# Patient Record
Sex: FEMALE | Race: ASIAN | URBAN - METROPOLITAN AREA
[De-identification: names, ages, dates, MRNs, and addresses within clinical notes are randomized per-mention and may not be internally consistent; named-entity substitution may affect disease eponyms.]

---

## 2018-02-13 ENCOUNTER — INPATIENT (INPATIENT)
Facility: HOSPITAL | Age: 33
LOS: 40 days | Discharge: HOME | End: 2018-03-26
Attending: PSYCHIATRY & NEUROLOGY | Admitting: FAMILY MEDICINE

## 2018-02-13 VITALS
HEART RATE: 124 BPM | TEMPERATURE: 97 F | DIASTOLIC BLOOD PRESSURE: 105 MMHG | WEIGHT: 154.98 LBS | OXYGEN SATURATION: 99 % | HEIGHT: 65 IN | RESPIRATION RATE: 18 BRPM | SYSTOLIC BLOOD PRESSURE: 146 MMHG

## 2018-02-13 DIAGNOSIS — F31.9 BIPOLAR DISORDER, UNSPECIFIED: ICD-10-CM

## 2018-02-13 DIAGNOSIS — F31.2 BIPOLAR DISORDER, CURRENT EPISODE MANIC SEVERE WITH PSYCHOTIC FEATURES: ICD-10-CM

## 2018-02-13 DIAGNOSIS — F31.63 BIPOLAR DISORDER, CURRENT EPISODE MIXED, SEVERE, WITHOUT PSYCHOTIC FEATURES: ICD-10-CM

## 2018-02-13 LAB
ANION GAP SERPL CALC-SCNC: 6 MMOL/L — LOW (ref 7–14)
ANION GAP SERPL CALC-SCNC: 7 MMOL/L — SIGNIFICANT CHANGE UP (ref 7–14)
APAP SERPL-MCNC: <10 UG/ML — SIGNIFICANT CHANGE UP (ref 10–30)
BASOPHILS # BLD AUTO: 0.04 K/UL — SIGNIFICANT CHANGE UP (ref 0–0.2)
BASOPHILS NFR BLD AUTO: 0.4 % — SIGNIFICANT CHANGE UP (ref 0–1)
BUN SERPL-MCNC: 6 MG/DL — LOW (ref 10–20)
BUN SERPL-MCNC: 7 MG/DL — LOW (ref 10–20)
CALCIUM SERPL-MCNC: 9.5 MG/DL — SIGNIFICANT CHANGE UP (ref 8.5–10.1)
CALCIUM SERPL-MCNC: 9.5 MG/DL — SIGNIFICANT CHANGE UP (ref 8.5–10.1)
CHLORIDE SERPL-SCNC: 103 MMOL/L — SIGNIFICANT CHANGE UP (ref 98–110)
CHLORIDE SERPL-SCNC: 106 MMOL/L — SIGNIFICANT CHANGE UP (ref 98–110)
CO2 SERPL-SCNC: 24 MMOL/L — SIGNIFICANT CHANGE UP (ref 17–32)
CO2 SERPL-SCNC: 26 MMOL/L — SIGNIFICANT CHANGE UP (ref 17–32)
CREAT SERPL-MCNC: 0.6 MG/DL — LOW (ref 0.7–1.5)
CREAT SERPL-MCNC: 0.6 MG/DL — LOW (ref 0.7–1.5)
EOSINOPHIL # BLD AUTO: 0.04 K/UL — SIGNIFICANT CHANGE UP (ref 0–0.7)
EOSINOPHIL NFR BLD AUTO: 0.4 % — SIGNIFICANT CHANGE UP (ref 0–8)
ETHANOL SERPL-MCNC: 5 MG/DL — HIGH
GLUCOSE SERPL-MCNC: 89 MG/DL — SIGNIFICANT CHANGE UP (ref 70–110)
GLUCOSE SERPL-MCNC: 92 MG/DL — SIGNIFICANT CHANGE UP (ref 70–110)
HCG SERPL-ACNC: <0.6 MIU/ML — SIGNIFICANT CHANGE UP (ref 0–5)
HCT VFR BLD CALC: 39.5 % — SIGNIFICANT CHANGE UP (ref 37–47)
HDLC SERPL-MCNC: 49 MG/DL — SIGNIFICANT CHANGE UP (ref 40–60)
HGB BLD-MCNC: 13.4 G/DL — LOW (ref 14–18)
IMM GRANULOCYTES NFR BLD AUTO: 0.2 % — SIGNIFICANT CHANGE UP (ref 0.1–0.3)
LYMPHOCYTES # BLD AUTO: 2.55 K/UL — SIGNIFICANT CHANGE UP (ref 1.2–3.4)
LYMPHOCYTES # BLD AUTO: 28.5 % — SIGNIFICANT CHANGE UP (ref 20.5–51.1)
MCHC RBC-ENTMCNC: 29.4 PG — SIGNIFICANT CHANGE UP (ref 27–31)
MCHC RBC-ENTMCNC: 33.9 G/DL — SIGNIFICANT CHANGE UP (ref 32–37)
MCV RBC AUTO: 86.6 FL — SIGNIFICANT CHANGE UP (ref 81–91)
MONOCYTES # BLD AUTO: 0.76 K/UL — HIGH (ref 0.1–0.6)
MONOCYTES NFR BLD AUTO: 8.5 % — SIGNIFICANT CHANGE UP (ref 1.7–9.3)
NEUTROPHILS # BLD AUTO: 5.55 K/UL — SIGNIFICANT CHANGE UP (ref 1.4–6.5)
NEUTROPHILS NFR BLD AUTO: 62 % — SIGNIFICANT CHANGE UP (ref 42.2–75.2)
NRBC # BLD: 0 /100 WBCS — SIGNIFICANT CHANGE UP (ref 0–0)
PLATELET # BLD AUTO: 368 K/UL — SIGNIFICANT CHANGE UP (ref 130–400)
POTASSIUM SERPL-MCNC: 4 MMOL/L — SIGNIFICANT CHANGE UP (ref 3.5–5)
POTASSIUM SERPL-MCNC: 4.1 MMOL/L — SIGNIFICANT CHANGE UP (ref 3.5–5)
POTASSIUM SERPL-SCNC: 4 MMOL/L — SIGNIFICANT CHANGE UP (ref 3.5–5)
POTASSIUM SERPL-SCNC: 4.1 MMOL/L — SIGNIFICANT CHANGE UP (ref 3.5–5)
RBC # BLD: 4.56 M/UL — SIGNIFICANT CHANGE UP (ref 4.2–5.4)
RBC # FLD: 11.5 % — SIGNIFICANT CHANGE UP (ref 11.5–14.5)
SALICYLATES SERPL-MCNC: <4 MG/DL — SIGNIFICANT CHANGE UP (ref 4–30)
SODIUM SERPL-SCNC: 136 MMOL/L — SIGNIFICANT CHANGE UP (ref 135–146)
SODIUM SERPL-SCNC: 136 MMOL/L — SIGNIFICANT CHANGE UP (ref 135–146)
WBC # BLD: 8.96 K/UL — SIGNIFICANT CHANGE UP (ref 4.8–10.8)
WBC # FLD AUTO: 8.96 K/UL — SIGNIFICANT CHANGE UP (ref 4.8–10.8)

## 2018-02-13 RX ORDER — ARIPIPRAZOLE 15 MG/1
10 TABLET ORAL DAILY
Qty: 0 | Refills: 0 | Status: DISCONTINUED | OUTPATIENT
Start: 2018-02-13 | End: 2018-02-15

## 2018-02-13 RX ADMIN — Medication 1 MILLIGRAM(S): at 12:08

## 2018-02-13 RX ADMIN — ARIPIPRAZOLE 10 MILLIGRAM(S): 15 TABLET ORAL at 12:08

## 2018-02-13 NOTE — H&P ADULT - ASSESSMENT
34 y/o female with PMHX of Bipolar D/O aggressive and manic, stopped taking medications x 1 year and expressed suicidal ideation, subsequently admitted to IPP for further evaluation

## 2018-02-13 NOTE — BEHAVIORAL HEALTH ASSESSMENT NOTE - HPI (INCLUDE ILLNESS QUALITY, SEVERITY, DURATION, TIMING, CONTEXT, MODIFYING FACTORS, ASSOCIATED SIGNS AND SYMPTOMS)
33 yr old  female bib ems for acting agitated and aggressive in [facility]. in she is observed to be agitated, talking to self, aggressive paranoid towards staff and guarded.  she very labile with shift in affect from crying and laughing and anger agitation during the interview. admit hx of bipolar disorder and ipp in the past. she has not been taking her medications for the past year. reports not sleeping for days poor appetite not able to focus and function. denies any drug or alcohol use. denies any s/h ideations. as per sister pt has been decompensating since december with increasing impulsivity, spending spee, agitation and aggressiveness, not functioning at all. also has expressed suicidal thoughts to her friends. This is a 34yo single,  female, domiciled in private residence with her brother, works as a dental hygienist, with a history of bipolar disorder and noncompliant with medications for 2 years, who was brought in BIB EMS for acting agitated and aggressive in [facility] [place]. She was admitted to IPP for paranoia, aggressiveness, and labile mood, and poor impulse control. On approach, pt was calm and cooperative. She states she doesn't understand what happened. She explains that she lost her phone while in [facility] [place] and called 911 and was brought to the hospital. She admits that she has been increasingly paranoid for the past 2 days and hasn't sleept in 24 hours. She explains that when she is stressed out, she "gets like this". She states that she has been working more hours at work and commuting back and fourth to the city and its "a lot". She states she sees a private psychiatrist in [place] every 3 months but admits that she has not been taking her medications (zoloft and abilify) for the past 2 years as she states she "didn't need them". She also admits to having a new business idea that she was very excited about yesterday. She denies any symptoms of depression. She denies any current symptoms of psychosis. She denies any past suicide attempts or any current substance use.

## 2018-02-13 NOTE — H&P ADULT - NSHPPHYSICALEXAM_GEN_ALL_CORE
PHYSICAL EXAM:  GENERAL: NAD, well-developed  SKIN: No rashes or lesions  HEAD:  Atraumatic, Normocephalic  EYES: EOMI, PERRLA, conjunctiva and sclera clear  NECK: Supple, No JVD  CHEST/LUNG: Clear to auscultation bilaterally; No wheeze  HEART: Regular rate and rhythm; No murmurs, rubs, or gallops  ABDOMEN: Soft, Nontender, Nondistended; Bowel sounds present  EXTREMITIES:  No clubbing, cyanosis, or edema  CNS: AAOx3

## 2018-02-13 NOTE — ED PROVIDER NOTE - CROS ED RESP ALL NEG
Physician DVT/VTE





- Nurse DVT Assessment & Total


Each Risk Factor Represents 3 Points: Medical PT with Hx of MI, CHF, Severe 

infection/sepsis


Each Risk Factor Represents 2 Points: Age 60-74


DVT Assessment Score: 5





- 5 or more Very High Risk


Treatments: Early Ambulation *, Sequential Compression Device


Pharmacological: Enoxaparin 40mg SQ Daily
negative...

## 2018-02-13 NOTE — H&P ADULT - HISTORY OF PRESENT ILLNESS
HPI per Psych note: 33 yr old  female bib ems for acting agitated and aggressive in [facility]. in she is observed to be agitated, talking to self, aggressive paranoid towards staff and guarded.  she very labile with shift in affect from crying and laughing and anger agitation during the interview. admit hx of bipolar disorder and ipp in the past. she has not been taking her medications for the past year. reports not sleeping for days poor appetite not able to focus and function. denies any drug or alcohol use. denies any s/h ideations. as per sister pt has been decompensating since december with increasing impulsivity, spending spee, agitation and aggressiveness, not functioning at all. also has expressed suicidal thoughts to her friends.    Patient somnolent at bedside visit.  States she is feeling well and voices no active complaints.  Denies any CP/SOB/N/V/D/Abd pain.  States she was once on Abilify and Zoloft but has not taken her medications x 1 year or more

## 2018-02-13 NOTE — BEHAVIORAL HEALTH ASSESSMENT NOTE - SUMMARY
pt is presenting acute manic sx with psychosis. she is not safe to be herself due to expressed suicidal ideations and lability of mood affect and impaired reality testing. needs ipp for safety and stabilization. This is a 32yo single,  female, domiciled in private residence with her brother, works as a dental hygienist, with a history of bipolar disorder and noncompliant with medications for 2 years, who was brought in BIB EMS for acting agitated and aggressive in [employer]. She requires inpatient psychiatric hospitalization for safety and stabilization as she is acutely manic with impaired reality testing.

## 2018-02-13 NOTE — BEHAVIORAL HEALTH ASSESSMENT NOTE - OTHER PAST PSYCHIATRIC HISTORY (INCLUDE DETAILS REGARDING ONSET, COURSE OF ILLNESS, INPATIENT/OUTPATIENT TREATMENT)
hx of bipolar disorder 4 ipp since 2008 last one 1 yr ago. non compliant with aftercare and medications. most recent medications zoloft and abilify. Per patient, she has 2 IPP admissions for a manic episode at [facility], and per chart, she was at [facility] IPP in 2008 for manic episode. She has no past suicide attempts. She sees a private psychiatrist in [place] and was last stable on zoloft and abilify.

## 2018-02-13 NOTE — H&P ADULT - NSHPLABSRESULTS_GEN_ALL_CORE
13.4   8.96  )-----------( 368      ( 13 Feb 2018 11:37 )             39.5       02-13    136  |  103  |  7<L>  ----------------------------<  89  4.1   |  26  |  0.6<L>    Ca    9.5      13 Feb 2018 11:37

## 2018-02-13 NOTE — BEHAVIORAL HEALTH ASSESSMENT NOTE - RISK ASSESSMENT
Pt is moderate-to high risk as she has poor impulse control, impaired reality testing, and is acutely manic.

## 2018-02-13 NOTE — BEHAVIORAL HEALTH ASSESSMENT NOTE - PROBLEM SELECTOR PLAN 1
admit to ipp 939  abilify 10 mg po hs  suicidal precaution  med education counseling to promote compliance

## 2018-02-13 NOTE — BEHAVIORAL HEALTH ASSESSMENT NOTE - NSBHATTESTSEENBY_PSY_A_CORE
attending Psychiatrist without NP/Trainee Attending Psychiatrist supervising NP/Trainee, meeting pt...

## 2018-02-13 NOTE — BEHAVIORAL HEALTH ASSESSMENT NOTE - NSBHSUICPROTECTFACT_PSY_A_CORE
Responsibility to family and others Responsibility to family and others/Positive therapeutic relationships/Identifies reasons for living/Supportive social network or family
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
## 2018-02-13 NOTE — ED PROVIDER NOTE - ATTENDING CONTRIBUTION TO CARE
34 yo female h/o bipolar disease  non-compliant with medications; manic at present.  Will check labs, get psych evaluation, likely admission.  Exam as noted.
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
## 2018-02-13 NOTE — PATIENT PROFILE BEHAVIORAL HEALTH - GENERAL INFO COMMENT, PROFILE
pt. received on [facility]. pt. appeared calm and cooperative.placed in room 215-1. vital signs stabke will monitor julio.

## 2018-02-14 DIAGNOSIS — F31.9 BIPOLAR DISORDER, UNSPECIFIED: ICD-10-CM

## 2018-02-14 DIAGNOSIS — Z91.14 PATIENT'S OTHER NONCOMPLIANCE WITH MEDICATION REGIMEN: ICD-10-CM

## 2018-02-14 LAB
ESTIMATED AVERAGE GLUCOSE: 97 MG/DL — SIGNIFICANT CHANGE UP (ref 68–114)
HBA1C BLD-MCNC: 5 % — SIGNIFICANT CHANGE UP (ref 4–5.6)
TSH SERPL-MCNC: 2.62 UIU/ML — SIGNIFICANT CHANGE UP (ref 0.27–4.2)

## 2018-02-14 RX ADMIN — ARIPIPRAZOLE 10 MILLIGRAM(S): 15 TABLET ORAL at 08:31

## 2018-02-14 RX ADMIN — Medication 1 MILLIGRAM(S): at 21:08
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
## 2018-02-14 NOTE — CONSULT NOTE ADULT - ASSESSMENT
PAST MEDICAL & SURGICAL HISTORY:  Bipolar affective  No significant past surgical history      check b12  folate tsh  psych to follow up    ? pmh constipation  mom prn

## 2018-02-15 RX ORDER — ARIPIPRAZOLE 15 MG/1
15 TABLET ORAL DAILY
Qty: 0 | Refills: 0 | Status: DISCONTINUED | OUTPATIENT
Start: 2018-02-15 | End: 2018-02-22

## 2018-02-15 RX ORDER — OXCARBAZEPINE 300 MG/1
300 TABLET, FILM COATED ORAL
Qty: 0 | Refills: 0 | Status: DISCONTINUED | OUTPATIENT
Start: 2018-02-15 | End: 2018-03-12

## 2018-02-15 RX ADMIN — Medication 1 MILLIGRAM(S): at 06:29

## 2018-02-15 RX ADMIN — ARIPIPRAZOLE 10 MILLIGRAM(S): 15 TABLET ORAL at 11:52

## 2018-02-15 RX ADMIN — Medication 2 MILLIGRAM(S): at 22:56

## 2018-02-15 NOTE — PROGRESS NOTE BEHAVIORAL HEALTH - NSBHFUPINTERVALHXFT_PSY_A_CORE
Pt seen and examined. Case discussed with attending and staff. Per nursing, pt was paranoid, intrusive, and irritable over night. On approach, pt was upset and stated that she didn't sleep well last night and that "she knows what this is and we don't". She shouted "please leave me alone and just get out; I want to go home I just need to leave". She denies any side effects to her medications and denies any acute complaints at this time. Pt seen and examined. Case discussed with attending and staff. Per nursing, pt was paranoid, intrusive, and irritable over night. On approach, pt was upset and stated that she didn't sleep well last night and that "she knows what this is and we don't". She shouted "please leave me alone and just get out; I want to go home I just need to leave". She denies any side effects to her medications and denies any acute complaints at this time.    Spoke to Pt's brother at visiting hours- He states that his sister is not compliant with her medications because they make her sleepy so she stops taking them. He thinks she would benefit from an injectable.

## 2018-02-16 RX ORDER — HALOPERIDOL DECANOATE 100 MG/ML
5 INJECTION INTRAMUSCULAR ONCE
Qty: 0 | Refills: 0 | Status: COMPLETED | OUTPATIENT
Start: 2018-02-16 | End: 2018-02-16

## 2018-02-16 RX ORDER — DIPHENHYDRAMINE HCL 50 MG
50 CAPSULE ORAL ONCE
Qty: 0 | Refills: 0 | Status: COMPLETED | OUTPATIENT
Start: 2018-02-16 | End: 2018-02-16

## 2018-02-16 RX ORDER — CLONAZEPAM 1 MG
1 TABLET ORAL AT BEDTIME
Qty: 0 | Refills: 0 | Status: DISCONTINUED | OUTPATIENT
Start: 2018-02-16 | End: 2018-02-22

## 2018-02-16 RX ORDER — CLONAZEPAM 1 MG
1 TABLET ORAL EVERY 6 HOURS
Qty: 0 | Refills: 0 | Status: DISCONTINUED | OUTPATIENT
Start: 2018-02-16 | End: 2018-02-22

## 2018-02-16 RX ADMIN — Medication 1 MILLIGRAM(S): at 21:14

## 2018-02-16 RX ADMIN — OXCARBAZEPINE 300 MILLIGRAM(S): 300 TABLET, FILM COATED ORAL at 20:02

## 2018-02-16 RX ADMIN — ARIPIPRAZOLE 15 MILLIGRAM(S): 15 TABLET ORAL at 08:17

## 2018-02-16 RX ADMIN — Medication 2 MILLIGRAM(S): at 06:48

## 2018-02-16 RX ADMIN — HALOPERIDOL DECANOATE 5 MILLIGRAM(S): 100 INJECTION INTRAMUSCULAR at 21:39

## 2018-02-16 RX ADMIN — Medication 50 MILLIGRAM(S): at 21:39

## 2018-02-16 NOTE — PROGRESS NOTE BEHAVIORAL HEALTH - NSBHFUPINTERVALHXFT_PSY_A_CORE
Pt seen and examined. Case discussed with attending and staff. Per nursing, pt was awake for most of the night and paranoid. On approach, pt was cooperative. She states she refused to take trileptal this morning because she was concerned that it would prolong her discharge date if she starts taking a new medication. Patient is in agreement to take abilify, trileptal, and klonopin to help her sleep. She denies any acute symptoms at this time and is demanding for an exact date of discharge as she has plans to fly to [place].       Collateral obtained from pt's psychiatrist Dr. See- she informed me that patient was also on trileptal 300mg BID and information from pharmacy at [facility] [place] informed me that patient was prescribed klonopin 1mg (Pt stated this medication worked extremely well for her insomnia)

## 2018-02-17 RX ORDER — DIPHENHYDRAMINE HCL 50 MG
50 CAPSULE ORAL ONCE
Qty: 0 | Refills: 0 | Status: COMPLETED | OUTPATIENT
Start: 2018-02-17 | End: 2018-02-17

## 2018-02-17 RX ORDER — BACITRACIN ZINC 500 UNIT/G
1 OINTMENT IN PACKET (EA) TOPICAL THREE TIMES A DAY
Qty: 0 | Refills: 0 | Status: DISCONTINUED | OUTPATIENT
Start: 2018-02-17 | End: 2018-03-26

## 2018-02-17 RX ORDER — HALOPERIDOL DECANOATE 100 MG/ML
7.5 INJECTION INTRAMUSCULAR ONCE
Qty: 0 | Refills: 0 | Status: COMPLETED | OUTPATIENT
Start: 2018-02-17 | End: 2018-02-17

## 2018-02-17 RX ORDER — DIPHENHYDRAMINE HCL 50 MG
75 CAPSULE ORAL ONCE
Qty: 0 | Refills: 0 | Status: COMPLETED | OUTPATIENT
Start: 2018-02-17 | End: 2018-02-17

## 2018-02-17 RX ADMIN — Medication 1 MILLIGRAM(S): at 21:55

## 2018-02-17 RX ADMIN — ARIPIPRAZOLE 15 MILLIGRAM(S): 15 TABLET ORAL at 08:35

## 2018-02-17 RX ADMIN — OXCARBAZEPINE 300 MILLIGRAM(S): 300 TABLET, FILM COATED ORAL at 08:35

## 2018-02-17 RX ADMIN — Medication 50 MILLIGRAM(S): at 19:01

## 2018-02-17 RX ADMIN — OXCARBAZEPINE 300 MILLIGRAM(S): 300 TABLET, FILM COATED ORAL at 21:55

## 2018-02-17 RX ADMIN — Medication 1 MILLIGRAM(S): at 12:41

## 2018-02-17 RX ADMIN — Medication 1 MILLIGRAM(S): at 02:51

## 2018-02-17 RX ADMIN — Medication 1 APPLICATION(S): at 22:00

## 2018-02-17 RX ADMIN — Medication 2 MILLIGRAM(S): at 19:01

## 2018-02-17 NOTE — PROVIDER CONTACT NOTE (OTHER) - SITUATION
Patient has intrusive behavior, throwing food at wall, screaming for injections. Offered patient PRN PO. Does not want.

## 2018-02-17 NOTE — PROVIDER CONTACT NOTE (MEDICATION) - SITUATION
Patient yelling, screaming on floor and in staffs faces, patient grabbing at staff. Unable to redirect. Patient kicking staff.

## 2018-02-17 NOTE — PROGRESS NOTE BEHAVIORAL HEALTH - NSBHFUPINTERVALHXFT_PSY_A_CORE
Pt seen and examined. Case discussed with attending and staff. Per nursing, pt was paranoid, intrusive, and irritable over night. On approach, pt was upset and stated that she didn't sleep well last night and that "she knows what this is and we don't". She shouted "please leave me alone and just get out; I want to go home I just need to leave". She denies any side effects to her medications and denies any acute complaints at this time.    Spoke to Pt's brother at visiting hours- He states that his sister is not compliant with her medications because they make her sleepy so she stops taking them. He thinks she would benefit from an injectable.
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
## 2018-02-18 RX ADMIN — Medication 1 APPLICATION(S): at 20:12

## 2018-02-18 RX ADMIN — OXCARBAZEPINE 300 MILLIGRAM(S): 300 TABLET, FILM COATED ORAL at 20:12

## 2018-02-18 RX ADMIN — ARIPIPRAZOLE 15 MILLIGRAM(S): 15 TABLET ORAL at 08:42

## 2018-02-18 RX ADMIN — OXCARBAZEPINE 300 MILLIGRAM(S): 300 TABLET, FILM COATED ORAL at 08:07

## 2018-02-18 RX ADMIN — Medication 1 MILLIGRAM(S): at 08:07

## 2018-02-18 RX ADMIN — Medication 1 MILLIGRAM(S): at 20:12

## 2018-02-18 RX ADMIN — Medication 1 MILLIGRAM(S): at 22:33

## 2018-02-18 RX ADMIN — Medication 1 APPLICATION(S): at 08:07

## 2018-02-18 NOTE — PROGRESS NOTE BEHAVIORAL HEALTH - NSBHFUPINTERVALHXFT_PSY_A_CORE
Pt seen and examined. Case discussed with attending and staff. Per nursing, pt was paranoid, intrusive, and irritable over night. On approach, pt was upset and stated that she didn't sleep well last night and that "she knows what this is and we don't". She shouted "please leave me alone and just get out; I want to go home I just need to leave". She denies any side effects to her medications and denies any acute complaints at this time.    Spoke to Pt's brother at visiting hours- He states that his sister is not compliant with her medications because they make her sleepy so she stops taking them. He thinks she would benefit from an injectable.   had seclusion on 2/17/18  .  today , pt is calm . remains  paranoid

## 2018-02-19 RX ORDER — HALOPERIDOL DECANOATE 100 MG/ML
5 INJECTION INTRAMUSCULAR ONCE
Qty: 0 | Refills: 0 | Status: COMPLETED | OUTPATIENT
Start: 2018-02-19 | End: 2018-02-19

## 2018-02-19 RX ORDER — CLONAZEPAM 1 MG
1 TABLET ORAL ONCE
Qty: 0 | Refills: 0 | Status: DISCONTINUED | OUTPATIENT
Start: 2018-02-19 | End: 2018-02-19

## 2018-02-19 RX ORDER — BENZOCAINE AND MENTHOL 5; 1 G/100ML; G/100ML
1 LIQUID ORAL EVERY 4 HOURS
Qty: 0 | Refills: 0 | Status: DISCONTINUED | OUTPATIENT
Start: 2018-02-19 | End: 2018-03-26

## 2018-02-19 RX ADMIN — ARIPIPRAZOLE 15 MILLIGRAM(S): 15 TABLET ORAL at 07:54

## 2018-02-19 RX ADMIN — Medication 1 MILLIGRAM(S): at 08:12

## 2018-02-19 RX ADMIN — Medication 1 APPLICATION(S): at 13:56

## 2018-02-19 RX ADMIN — HALOPERIDOL DECANOATE 5 MILLIGRAM(S): 100 INJECTION INTRAMUSCULAR at 08:12

## 2018-02-19 RX ADMIN — OXCARBAZEPINE 300 MILLIGRAM(S): 300 TABLET, FILM COATED ORAL at 20:20

## 2018-02-19 RX ADMIN — Medication 1 APPLICATION(S): at 20:20

## 2018-02-19 RX ADMIN — Medication 1 MILLIGRAM(S): at 07:55

## 2018-02-19 RX ADMIN — HALOPERIDOL DECANOATE 5 MILLIGRAM(S): 100 INJECTION INTRAMUSCULAR at 19:14

## 2018-02-19 RX ADMIN — OXCARBAZEPINE 300 MILLIGRAM(S): 300 TABLET, FILM COATED ORAL at 07:54

## 2018-02-19 RX ADMIN — Medication 1 APPLICATION(S): at 07:56

## 2018-02-19 RX ADMIN — Medication 1 MILLIGRAM(S): at 20:20

## 2018-02-19 RX ADMIN — BENZOCAINE AND MENTHOL 1 LOZENGE: 5; 1 LIQUID ORAL at 17:50

## 2018-02-19 RX ADMIN — Medication 1 MILLIGRAM(S): at 15:43

## 2018-02-19 RX ADMIN — BENZOCAINE AND MENTHOL 1 LOZENGE: 5; 1 LIQUID ORAL at 13:55

## 2018-02-19 NOTE — PROGRESS NOTE BEHAVIORAL HEALTH - NSBHFUPINTERVALHXFT_PSY_A_CORE
Pt currently not fully cooperative, hyperverbal, with pressured speech, tangential with flight of ideas seen and paranoid, denies any suicidal or homicidal ideation and with impaired insight and judgment.  Pt currently responsive to 1:1 constant observation staff's verbal redirection.

## 2018-02-20 RX ADMIN — ARIPIPRAZOLE 15 MILLIGRAM(S): 15 TABLET ORAL at 08:00

## 2018-02-20 RX ADMIN — Medication 1 APPLICATION(S): at 08:01

## 2018-02-20 RX ADMIN — Medication 1 APPLICATION(S): at 12:22

## 2018-02-20 RX ADMIN — OXCARBAZEPINE 300 MILLIGRAM(S): 300 TABLET, FILM COATED ORAL at 21:16

## 2018-02-20 RX ADMIN — OXCARBAZEPINE 300 MILLIGRAM(S): 300 TABLET, FILM COATED ORAL at 08:00

## 2018-02-20 RX ADMIN — Medication 1 MILLIGRAM(S): at 07:48

## 2018-02-20 RX ADMIN — Medication 1 MILLIGRAM(S): at 17:39

## 2018-02-20 RX ADMIN — Medication 1 MILLIGRAM(S): at 21:15

## 2018-02-21 RX ADMIN — Medication 1 MILLIGRAM(S): at 09:24

## 2018-02-21 RX ADMIN — Medication 1 APPLICATION(S): at 20:02

## 2018-02-21 RX ADMIN — Medication 1 MILLIGRAM(S): at 20:02

## 2018-02-21 RX ADMIN — ARIPIPRAZOLE 15 MILLIGRAM(S): 15 TABLET ORAL at 08:07

## 2018-02-21 RX ADMIN — OXCARBAZEPINE 300 MILLIGRAM(S): 300 TABLET, FILM COATED ORAL at 20:02

## 2018-02-21 RX ADMIN — BENZOCAINE AND MENTHOL 1 LOZENGE: 5; 1 LIQUID ORAL at 03:49

## 2018-02-21 RX ADMIN — Medication 1 APPLICATION(S): at 08:08

## 2018-02-21 RX ADMIN — OXCARBAZEPINE 300 MILLIGRAM(S): 300 TABLET, FILM COATED ORAL at 08:07

## 2018-02-21 NOTE — PROGRESS NOTE BEHAVIORAL HEALTH - NSBHFUPINTERVALHXFT_PSY_A_CORE
Pt seen and examined. Case discussed with attending and staff. Per nursing, pt is loud and agitated and difficult to redirect. On approach, pt was cooperative. She states that she is sleeping better and feels well rested in the morning. During family meeting with her mother, patient admitted to having paranoid thoughts that someone on the outside is following her. She denies any other complaints at this time.     Spoke with patient's brother Wilber ([phone]) and pt's mother during visiting hours and they both agree that patient is still not ready and that this is not her baseline. They both state that she is paranoid, loud, easily agitated, and pt's mother states that she wouldn't know how to handle her daughter if she had an outburst at home. They both agree that patient needs to be hospitalized for longer.     Spoke to Pt's brother at visiting hours- He states that his sister is not compliant with her medications because they make her sleepy so she stops taking them. He thinks she would benefit from an injectable.

## 2018-02-22 RX ORDER — ARIPIPRAZOLE 15 MG/1
30 TABLET ORAL DAILY
Qty: 0 | Refills: 0 | Status: DISCONTINUED | OUTPATIENT
Start: 2018-02-23 | End: 2018-03-14

## 2018-02-22 RX ORDER — DIPHENHYDRAMINE HCL 50 MG
50 CAPSULE ORAL EVERY 6 HOURS
Qty: 0 | Refills: 0 | Status: DISCONTINUED | OUTPATIENT
Start: 2018-02-22 | End: 2018-03-26

## 2018-02-22 RX ORDER — ARIPIPRAZOLE 15 MG/1
15 TABLET ORAL AT BEDTIME
Qty: 0 | Refills: 0 | Status: COMPLETED | OUTPATIENT
Start: 2018-02-22 | End: 2018-02-22

## 2018-02-22 RX ORDER — CLONAZEPAM 1 MG
1 TABLET ORAL EVERY 12 HOURS
Qty: 0 | Refills: 0 | Status: DISCONTINUED | OUTPATIENT
Start: 2018-02-22 | End: 2018-02-22

## 2018-02-22 RX ADMIN — OXCARBAZEPINE 300 MILLIGRAM(S): 300 TABLET, FILM COATED ORAL at 08:26

## 2018-02-22 RX ADMIN — ARIPIPRAZOLE 15 MILLIGRAM(S): 15 TABLET ORAL at 20:04

## 2018-02-22 RX ADMIN — Medication 1 APPLICATION(S): at 13:16

## 2018-02-22 RX ADMIN — Medication 1 APPLICATION(S): at 20:05

## 2018-02-22 RX ADMIN — Medication 1 MILLIGRAM(S): at 07:32

## 2018-02-22 RX ADMIN — Medication 1 APPLICATION(S): at 08:26

## 2018-02-22 RX ADMIN — OXCARBAZEPINE 300 MILLIGRAM(S): 300 TABLET, FILM COATED ORAL at 20:05

## 2018-02-22 RX ADMIN — Medication 2 MILLIGRAM(S): at 20:07

## 2018-02-22 RX ADMIN — Medication 1 MILLIGRAM(S): at 00:30

## 2018-02-22 RX ADMIN — Medication 1 MILLIGRAM(S): at 13:15

## 2018-02-22 RX ADMIN — ARIPIPRAZOLE 15 MILLIGRAM(S): 15 TABLET ORAL at 08:26

## 2018-02-22 NOTE — PROGRESS NOTE BEHAVIORAL HEALTH - NSBHFUPINTERVALHXFT_PSY_A_CORE
Pt seen and examined. Case discussed with attending and staff. Per nursing, pt did not sleep at all last night. On approach, patient was seen shouting and outside of her room. She states that she doesn't remember if she slept or not. She then proceeded to ask if the writer was recording her and if there were cameras in the room. She also told writer that all of our phones are tapped. She denies any other complaints at this time.

## 2018-02-23 RX ADMIN — OXCARBAZEPINE 300 MILLIGRAM(S): 300 TABLET, FILM COATED ORAL at 20:27

## 2018-02-23 RX ADMIN — Medication 2 MILLIGRAM(S): at 20:28

## 2018-02-23 RX ADMIN — Medication 2 MILLIGRAM(S): at 08:20

## 2018-02-23 RX ADMIN — Medication 50 MILLIGRAM(S): at 18:22

## 2018-02-23 RX ADMIN — Medication 1 APPLICATION(S): at 20:27

## 2018-02-23 RX ADMIN — Medication 1 APPLICATION(S): at 08:19

## 2018-02-23 RX ADMIN — Medication 2 MILLIGRAM(S): at 16:45

## 2018-02-23 RX ADMIN — Medication 2 MILLIGRAM(S): at 13:24

## 2018-02-23 RX ADMIN — Medication 1 APPLICATION(S): at 13:23

## 2018-02-23 RX ADMIN — OXCARBAZEPINE 300 MILLIGRAM(S): 300 TABLET, FILM COATED ORAL at 08:19

## 2018-02-23 RX ADMIN — ARIPIPRAZOLE 30 MILLIGRAM(S): 15 TABLET ORAL at 08:19

## 2018-02-23 NOTE — PROVIDER CONTACT NOTE (FALL NOTIFICATION) - ASSESSMENT
Patient denied pain or acute distress. Vital signs stable (/86; P98; R18; T98.2). Patient is alert and oriented. Patient's room is free of clutters; floor is clean and dry. 1:1 constant observation maintained. [id number] safety checks in progress and maintained.

## 2018-02-24 RX ADMIN — Medication 2 MILLIGRAM(S): at 12:57

## 2018-02-24 RX ADMIN — Medication 2 MILLIGRAM(S): at 20:36

## 2018-02-24 RX ADMIN — Medication 1 APPLICATION(S): at 12:57

## 2018-02-24 RX ADMIN — ARIPIPRAZOLE 30 MILLIGRAM(S): 15 TABLET ORAL at 08:00

## 2018-02-24 RX ADMIN — Medication 1 APPLICATION(S): at 08:02

## 2018-02-24 RX ADMIN — Medication 2 MILLIGRAM(S): at 08:03

## 2018-02-24 RX ADMIN — Medication 1 APPLICATION(S): at 20:37

## 2018-02-24 RX ADMIN — OXCARBAZEPINE 300 MILLIGRAM(S): 300 TABLET, FILM COATED ORAL at 08:00

## 2018-02-24 RX ADMIN — OXCARBAZEPINE 300 MILLIGRAM(S): 300 TABLET, FILM COATED ORAL at 20:36

## 2018-02-24 NOTE — PROGRESS NOTE BEHAVIORAL HEALTH - NSBHFUPINTERVALHXFT_PSY_A_CORE
pt required prn medication; continues to need 1:1 redirection. compliant with medications  denies side effects

## 2018-02-25 RX ADMIN — Medication 2 MILLIGRAM(S): at 19:42

## 2018-02-25 RX ADMIN — OXCARBAZEPINE 300 MILLIGRAM(S): 300 TABLET, FILM COATED ORAL at 19:42

## 2018-02-25 RX ADMIN — Medication 2 MILLIGRAM(S): at 12:31

## 2018-02-25 RX ADMIN — Medication 50 MILLIGRAM(S): at 19:42

## 2018-02-25 RX ADMIN — ARIPIPRAZOLE 30 MILLIGRAM(S): 15 TABLET ORAL at 09:00

## 2018-02-25 RX ADMIN — OXCARBAZEPINE 300 MILLIGRAM(S): 300 TABLET, FILM COATED ORAL at 09:00

## 2018-02-25 RX ADMIN — Medication 1 APPLICATION(S): at 12:30

## 2018-02-25 RX ADMIN — BENZOCAINE AND MENTHOL 1 LOZENGE: 5; 1 LIQUID ORAL at 02:32

## 2018-02-25 RX ADMIN — Medication 50 MILLIGRAM(S): at 02:30

## 2018-02-25 RX ADMIN — Medication 2 MILLIGRAM(S): at 09:01

## 2018-02-25 RX ADMIN — Medication 1 APPLICATION(S): at 09:00

## 2018-02-25 RX ADMIN — Medication 1 APPLICATION(S): at 20:07

## 2018-02-26 LAB
C DIFF BY PCR RESULT: NEGATIVE — SIGNIFICANT CHANGE UP
C DIFF TOX GENS STL QL NAA+PROBE: SIGNIFICANT CHANGE UP

## 2018-02-26 RX ORDER — DIPHENHYDRAMINE HCL 50 MG
50 CAPSULE ORAL ONCE
Qty: 0 | Refills: 0 | Status: COMPLETED | OUTPATIENT
Start: 2018-02-26 | End: 2018-02-26

## 2018-02-26 RX ORDER — OLANZAPINE 15 MG/1
10 TABLET, FILM COATED ORAL ONCE
Qty: 0 | Refills: 0 | Status: COMPLETED | OUTPATIENT
Start: 2018-02-26 | End: 2018-02-26

## 2018-02-26 RX ADMIN — OXCARBAZEPINE 300 MILLIGRAM(S): 300 TABLET, FILM COATED ORAL at 09:23

## 2018-02-26 RX ADMIN — Medication 1 APPLICATION(S): at 21:15

## 2018-02-26 RX ADMIN — ARIPIPRAZOLE 30 MILLIGRAM(S): 15 TABLET ORAL at 09:23

## 2018-02-26 RX ADMIN — Medication 2 MILLIGRAM(S): at 10:02

## 2018-02-26 RX ADMIN — OXCARBAZEPINE 300 MILLIGRAM(S): 300 TABLET, FILM COATED ORAL at 21:17

## 2018-02-26 RX ADMIN — OLANZAPINE 10 MILLIGRAM(S): 15 TABLET, FILM COATED ORAL at 05:39

## 2018-02-26 RX ADMIN — Medication 50 MILLIGRAM(S): at 01:02

## 2018-02-26 RX ADMIN — BENZOCAINE AND MENTHOL 1 LOZENGE: 5; 1 LIQUID ORAL at 06:35

## 2018-02-26 RX ADMIN — Medication 1 APPLICATION(S): at 09:23

## 2018-02-26 RX ADMIN — Medication 2 MILLIGRAM(S): at 21:18

## 2018-02-26 RX ADMIN — Medication 1 APPLICATION(S): at 13:53

## 2018-02-26 RX ADMIN — Medication 2 MILLIGRAM(S): at 00:05

## 2018-02-26 RX ADMIN — Medication 2 MILLIGRAM(S): at 09:23

## 2018-02-26 NOTE — PROGRESS NOTE BEHAVIORAL HEALTH - NSBHFUPINTERVALHXFT_PSY_A_CORE
Pt remains paranoid and irritable/angry.  He denies any suicidal or homicidal ideation at present currently cooperative with treatment and responsive to staff's verbal redirection.

## 2018-02-27 RX ORDER — ACETAMINOPHEN 500 MG
650 TABLET ORAL EVERY 6 HOURS
Qty: 0 | Refills: 0 | Status: DISCONTINUED | OUTPATIENT
Start: 2018-02-27 | End: 2018-03-26

## 2018-02-27 RX ADMIN — Medication 1 APPLICATION(S): at 20:20

## 2018-02-27 RX ADMIN — Medication 650 MILLIGRAM(S): at 06:48

## 2018-02-27 RX ADMIN — OXCARBAZEPINE 300 MILLIGRAM(S): 300 TABLET, FILM COATED ORAL at 09:10

## 2018-02-27 RX ADMIN — Medication 2 MILLIGRAM(S): at 13:43

## 2018-02-27 RX ADMIN — Medication 2 MILLIGRAM(S): at 20:21

## 2018-02-27 RX ADMIN — Medication 1 APPLICATION(S): at 09:12

## 2018-02-27 RX ADMIN — Medication 1 APPLICATION(S): at 13:42

## 2018-02-27 RX ADMIN — Medication 2 MILLIGRAM(S): at 09:21

## 2018-02-27 RX ADMIN — ARIPIPRAZOLE 30 MILLIGRAM(S): 15 TABLET ORAL at 09:11

## 2018-02-27 RX ADMIN — OXCARBAZEPINE 300 MILLIGRAM(S): 300 TABLET, FILM COATED ORAL at 20:20

## 2018-02-28 RX ADMIN — Medication 2 MILLIGRAM(S): at 12:50

## 2018-02-28 RX ADMIN — Medication 2 MILLIGRAM(S): at 09:24

## 2018-02-28 RX ADMIN — Medication 1 APPLICATION(S): at 12:50

## 2018-02-28 RX ADMIN — Medication 1 APPLICATION(S): at 09:23

## 2018-02-28 RX ADMIN — Medication 2 MILLIGRAM(S): at 21:21

## 2018-02-28 RX ADMIN — OXCARBAZEPINE 300 MILLIGRAM(S): 300 TABLET, FILM COATED ORAL at 09:23

## 2018-02-28 RX ADMIN — ARIPIPRAZOLE 30 MILLIGRAM(S): 15 TABLET ORAL at 09:22

## 2018-02-28 RX ADMIN — OXCARBAZEPINE 300 MILLIGRAM(S): 300 TABLET, FILM COATED ORAL at 21:20

## 2018-02-28 NOTE — PROGRESS NOTE BEHAVIORAL HEALTH - NSBHFUPINTERVALCCFT_PSY_A_CORE
no further GI c/o.  somewhat less irritable and angry.  Less adamant about microphones and less paranoid

## 2018-03-01 RX ADMIN — Medication 1 APPLICATION(S): at 20:25

## 2018-03-01 RX ADMIN — OXCARBAZEPINE 300 MILLIGRAM(S): 300 TABLET, FILM COATED ORAL at 08:24

## 2018-03-01 RX ADMIN — Medication 2 MILLIGRAM(S): at 13:49

## 2018-03-01 RX ADMIN — Medication 1 APPLICATION(S): at 13:50

## 2018-03-01 RX ADMIN — ARIPIPRAZOLE 30 MILLIGRAM(S): 15 TABLET ORAL at 08:24

## 2018-03-01 RX ADMIN — Medication 2 MILLIGRAM(S): at 20:26

## 2018-03-01 RX ADMIN — OXCARBAZEPINE 300 MILLIGRAM(S): 300 TABLET, FILM COATED ORAL at 20:25

## 2018-03-01 RX ADMIN — Medication 1 APPLICATION(S): at 08:24

## 2018-03-01 RX ADMIN — Medication 2 MILLIGRAM(S): at 08:25

## 2018-03-02 RX ADMIN — Medication 2 MILLIGRAM(S): at 12:53

## 2018-03-02 RX ADMIN — OXCARBAZEPINE 300 MILLIGRAM(S): 300 TABLET, FILM COATED ORAL at 20:40

## 2018-03-02 RX ADMIN — Medication 50 MILLIGRAM(S): at 23:15

## 2018-03-02 RX ADMIN — OXCARBAZEPINE 300 MILLIGRAM(S): 300 TABLET, FILM COATED ORAL at 08:16

## 2018-03-02 RX ADMIN — Medication 2 MILLIGRAM(S): at 20:41

## 2018-03-02 RX ADMIN — Medication 1 APPLICATION(S): at 08:16

## 2018-03-02 RX ADMIN — Medication 1 APPLICATION(S): at 20:40

## 2018-03-02 RX ADMIN — Medication 2 MILLIGRAM(S): at 11:51

## 2018-03-02 RX ADMIN — ARIPIPRAZOLE 30 MILLIGRAM(S): 15 TABLET ORAL at 08:16

## 2018-03-02 RX ADMIN — Medication 1 APPLICATION(S): at 12:53

## 2018-03-03 RX ADMIN — Medication 2 MILLIGRAM(S): at 13:39

## 2018-03-03 RX ADMIN — Medication 50 MILLIGRAM(S): at 20:51

## 2018-03-03 RX ADMIN — Medication 2 MILLIGRAM(S): at 09:04

## 2018-03-03 RX ADMIN — ARIPIPRAZOLE 30 MILLIGRAM(S): 15 TABLET ORAL at 09:02

## 2018-03-03 RX ADMIN — Medication 2 MILLIGRAM(S): at 20:51

## 2018-03-03 RX ADMIN — Medication 1 APPLICATION(S): at 13:39

## 2018-03-03 RX ADMIN — Medication 1 APPLICATION(S): at 09:02

## 2018-03-03 RX ADMIN — OXCARBAZEPINE 300 MILLIGRAM(S): 300 TABLET, FILM COATED ORAL at 09:03

## 2018-03-03 RX ADMIN — OXCARBAZEPINE 300 MILLIGRAM(S): 300 TABLET, FILM COATED ORAL at 20:51

## 2018-03-03 RX ADMIN — BENZOCAINE AND MENTHOL 1 LOZENGE: 5; 1 LIQUID ORAL at 04:55

## 2018-03-03 NOTE — PROGRESS NOTE BEHAVIORAL HEALTH - NSBHFUPINTERVALHXFT_PSY_A_CORE
chart reviewed , discussed  with staff , pt seen no outbursts, no acute event  overnight, compliant with medications  and denies side effect s

## 2018-03-04 RX ADMIN — Medication 2 MILLIGRAM(S): at 12:21

## 2018-03-04 RX ADMIN — OXCARBAZEPINE 300 MILLIGRAM(S): 300 TABLET, FILM COATED ORAL at 20:32

## 2018-03-04 RX ADMIN — OXCARBAZEPINE 300 MILLIGRAM(S): 300 TABLET, FILM COATED ORAL at 08:03

## 2018-03-04 RX ADMIN — Medication 50 MILLIGRAM(S): at 22:33

## 2018-03-04 RX ADMIN — ARIPIPRAZOLE 30 MILLIGRAM(S): 15 TABLET ORAL at 08:03

## 2018-03-04 RX ADMIN — Medication 2 MILLIGRAM(S): at 20:32

## 2018-03-04 RX ADMIN — Medication 1 APPLICATION(S): at 08:03

## 2018-03-04 RX ADMIN — Medication 1 APPLICATION(S): at 12:21

## 2018-03-04 RX ADMIN — Medication 2 MILLIGRAM(S): at 08:04

## 2018-03-04 RX ADMIN — Medication 1 APPLICATION(S): at 20:32

## 2018-03-05 RX ADMIN — Medication 1 APPLICATION(S): at 08:45

## 2018-03-05 RX ADMIN — Medication 2 MILLIGRAM(S): at 03:37

## 2018-03-05 RX ADMIN — ARIPIPRAZOLE 30 MILLIGRAM(S): 15 TABLET ORAL at 08:45

## 2018-03-05 RX ADMIN — Medication 2 MILLIGRAM(S): at 13:18

## 2018-03-05 RX ADMIN — Medication 50 MILLIGRAM(S): at 22:10

## 2018-03-05 RX ADMIN — Medication 2 MILLIGRAM(S): at 20:32

## 2018-03-05 RX ADMIN — Medication 1 APPLICATION(S): at 20:31

## 2018-03-05 RX ADMIN — Medication 1 APPLICATION(S): at 13:19

## 2018-03-05 RX ADMIN — Medication 2 MILLIGRAM(S): at 08:47

## 2018-03-05 RX ADMIN — OXCARBAZEPINE 300 MILLIGRAM(S): 300 TABLET, FILM COATED ORAL at 08:45

## 2018-03-05 RX ADMIN — OXCARBAZEPINE 300 MILLIGRAM(S): 300 TABLET, FILM COATED ORAL at 20:31

## 2018-03-05 RX ADMIN — BENZOCAINE AND MENTHOL 1 LOZENGE: 5; 1 LIQUID ORAL at 06:52

## 2018-03-06 RX ADMIN — ARIPIPRAZOLE 30 MILLIGRAM(S): 15 TABLET ORAL at 08:04

## 2018-03-06 RX ADMIN — OXCARBAZEPINE 300 MILLIGRAM(S): 300 TABLET, FILM COATED ORAL at 20:18

## 2018-03-06 RX ADMIN — Medication 2 MILLIGRAM(S): at 20:20

## 2018-03-06 RX ADMIN — OXCARBAZEPINE 300 MILLIGRAM(S): 300 TABLET, FILM COATED ORAL at 08:05

## 2018-03-06 RX ADMIN — Medication 1 APPLICATION(S): at 08:04

## 2018-03-07 RX ADMIN — Medication 1 APPLICATION(S): at 08:30

## 2018-03-07 RX ADMIN — Medication 1 APPLICATION(S): at 13:54

## 2018-03-07 RX ADMIN — OXCARBAZEPINE 300 MILLIGRAM(S): 300 TABLET, FILM COATED ORAL at 20:00

## 2018-03-07 RX ADMIN — ARIPIPRAZOLE 30 MILLIGRAM(S): 15 TABLET ORAL at 08:22

## 2018-03-07 RX ADMIN — BENZOCAINE AND MENTHOL 1 LOZENGE: 5; 1 LIQUID ORAL at 04:24

## 2018-03-07 RX ADMIN — Medication 2 MILLIGRAM(S): at 20:01

## 2018-03-07 RX ADMIN — OXCARBAZEPINE 300 MILLIGRAM(S): 300 TABLET, FILM COATED ORAL at 08:30

## 2018-03-07 NOTE — PROGRESS NOTE BEHAVIORAL HEALTH - NSBHFUPINTERVALCCFT_PSY_A_CORE
pt without c/o. She is off 1:1 without incident since yesterday. Pt has been organized enough to pay bills on line.  No s/h ideation or psychosis evident

## 2018-03-08 RX ADMIN — Medication 1 APPLICATION(S): at 08:36

## 2018-03-08 RX ADMIN — Medication 2 MILLIGRAM(S): at 20:21

## 2018-03-08 RX ADMIN — ARIPIPRAZOLE 30 MILLIGRAM(S): 15 TABLET ORAL at 08:35

## 2018-03-08 RX ADMIN — OXCARBAZEPINE 300 MILLIGRAM(S): 300 TABLET, FILM COATED ORAL at 08:35

## 2018-03-08 RX ADMIN — Medication 1 APPLICATION(S): at 13:06

## 2018-03-08 RX ADMIN — OXCARBAZEPINE 300 MILLIGRAM(S): 300 TABLET, FILM COATED ORAL at 20:21

## 2018-03-08 NOTE — CHART NOTE - NSCHARTNOTEFT_GEN_A_CORE
patient attended dbt, focused on pros and cons and TIPS skill. Patient was engaged in learning the skills, participated in group

## 2018-03-08 NOTE — PROGRESS NOTE BEHAVIORAL HEALTH - NSBHFUPINTERVALCCFT_PSY_A_CORE
pt is less labile than she was, but remains paranoid, thinking that others are listening to her for reasons she doesn't understand.    (doesn't appear to be distressed by this)

## 2018-03-09 RX ADMIN — ARIPIPRAZOLE 30 MILLIGRAM(S): 15 TABLET ORAL at 08:48

## 2018-03-09 RX ADMIN — Medication 2 MILLIGRAM(S): at 21:22

## 2018-03-09 RX ADMIN — Medication 1 APPLICATION(S): at 08:48

## 2018-03-09 RX ADMIN — OXCARBAZEPINE 300 MILLIGRAM(S): 300 TABLET, FILM COATED ORAL at 08:48

## 2018-03-09 RX ADMIN — OXCARBAZEPINE 300 MILLIGRAM(S): 300 TABLET, FILM COATED ORAL at 21:27

## 2018-03-09 NOTE — PROGRESS NOTE BEHAVIORAL HEALTH - NSBHFUPINTERVALCCFT_PSY_A_CORE
pt is not hypomanic; off 1:1 without incident.  Mood pleasant; no concern today with cameras or microphones recording/observing her.

## 2018-03-10 RX ADMIN — Medication 1 APPLICATION(S): at 18:10

## 2018-03-10 RX ADMIN — Medication 1 APPLICATION(S): at 08:38

## 2018-03-10 RX ADMIN — OXCARBAZEPINE 300 MILLIGRAM(S): 300 TABLET, FILM COATED ORAL at 20:05

## 2018-03-10 RX ADMIN — Medication 1 APPLICATION(S): at 20:05

## 2018-03-10 RX ADMIN — ARIPIPRAZOLE 30 MILLIGRAM(S): 15 TABLET ORAL at 08:37

## 2018-03-10 RX ADMIN — Medication 2 MILLIGRAM(S): at 20:06

## 2018-03-10 RX ADMIN — Medication 1 MILLIGRAM(S): at 14:54

## 2018-03-10 RX ADMIN — Medication 50 MILLIGRAM(S): at 06:35

## 2018-03-10 RX ADMIN — OXCARBAZEPINE 300 MILLIGRAM(S): 300 TABLET, FILM COATED ORAL at 08:37

## 2018-03-10 NOTE — PROGRESS NOTE BEHAVIORAL HEALTH - NSBHFUPINTERVALHXFT_PSY_A_CORE
Pt is alert, cooperative complaint with meds. Pt was in good behavioral control for most of the day. In the afternoon during her parents' visit pt became very agitated and required prn medication to help her gain control Pt was give Ativan1 mg po stat with good effect. Pt to continue c her current medications management.

## 2018-03-11 RX ORDER — OLANZAPINE 15 MG/1
10 TABLET, FILM COATED ORAL ONCE
Qty: 0 | Refills: 0 | Status: COMPLETED | OUTPATIENT
Start: 2018-03-11 | End: 2018-03-11

## 2018-03-11 RX ADMIN — OXCARBAZEPINE 300 MILLIGRAM(S): 300 TABLET, FILM COATED ORAL at 20:12

## 2018-03-11 RX ADMIN — OLANZAPINE 10 MILLIGRAM(S): 15 TABLET, FILM COATED ORAL at 11:36

## 2018-03-11 RX ADMIN — OXCARBAZEPINE 300 MILLIGRAM(S): 300 TABLET, FILM COATED ORAL at 08:10

## 2018-03-11 RX ADMIN — Medication 1 APPLICATION(S): at 20:13

## 2018-03-11 RX ADMIN — Medication 2 MILLIGRAM(S): at 20:13

## 2018-03-11 RX ADMIN — Medication 1 APPLICATION(S): at 13:39

## 2018-03-11 RX ADMIN — ARIPIPRAZOLE 30 MILLIGRAM(S): 15 TABLET ORAL at 08:10

## 2018-03-11 NOTE — PROGRESS NOTE BEHAVIORAL HEALTH - OTHER
hyperverbal and overproductive labile Restricted Paranoid believes "People are going into her room and moving things around.

## 2018-03-11 NOTE — PROGRESS NOTE BEHAVIORAL HEALTH - NSBHFUPINTERVALHXFT_PSY_A_CORE
Pt is not fully cooperative, hyperverbal, mood is labile, thinking is tangential with flight of ideas and admits to racing thoughts.  Pt has overt paranoid beliefs at present with poor impulse control, impaired insight and judgment.  Pt was offered Zydis 10 mg PO STAT for control of agitation and manic psychotic behavior.  Patient agrees and voluntarily accepted.

## 2018-03-12 RX ORDER — OXCARBAZEPINE 300 MG/1
600 TABLET, FILM COATED ORAL AT BEDTIME
Qty: 0 | Refills: 0 | Status: DISCONTINUED | OUTPATIENT
Start: 2018-03-12 | End: 2018-03-13

## 2018-03-12 RX ORDER — OXCARBAZEPINE 300 MG/1
300 TABLET, FILM COATED ORAL DAILY
Qty: 0 | Refills: 0 | Status: DISCONTINUED | OUTPATIENT
Start: 2018-03-12 | End: 2018-03-13

## 2018-03-12 RX ADMIN — Medication 50 MILLIGRAM(S): at 20:09

## 2018-03-12 RX ADMIN — OXCARBAZEPINE 300 MILLIGRAM(S): 300 TABLET, FILM COATED ORAL at 08:25

## 2018-03-12 RX ADMIN — Medication 1 APPLICATION(S): at 20:10

## 2018-03-12 RX ADMIN — Medication 2 MILLIGRAM(S): at 20:09

## 2018-03-12 RX ADMIN — OXCARBAZEPINE 600 MILLIGRAM(S): 300 TABLET, FILM COATED ORAL at 20:15

## 2018-03-12 RX ADMIN — Medication 1 MILLIGRAM(S): at 02:18

## 2018-03-12 RX ADMIN — ARIPIPRAZOLE 30 MILLIGRAM(S): 15 TABLET ORAL at 08:25

## 2018-03-12 RX ADMIN — Medication 2 MILLIGRAM(S): at 12:23

## 2018-03-12 RX ADMIN — Medication 1 APPLICATION(S): at 12:09

## 2018-03-12 RX ADMIN — Medication 1 APPLICATION(S): at 08:25

## 2018-03-12 NOTE — PROGRESS NOTE BEHAVIORAL HEALTH - NSBHFUPINTERVALHXFT_PSY_A_CORE
Patient seen and examined. Patient reports that she is "a lot better than before." Patient reports that she still struggles with sleep, reports that she is woken up by the noises on the unit. Patient reports that one of the her biggest problems is her difficulty communicating with her family at home as they speak chinese. She reports frustration with the fact that she wanted slippers but her family brought in shoes that she did not want. Patient states that she also feels that when she calls her family on the phone, the lines are tapped. Patient goes on to say that the electricians working on the unit are likely tapping the phone lines because they are "nosey, bad people." When patient is told that could likely be a delusion, patient reports "okay maybe it is , I don't know."     As per staff, patient is very intrusive on the unit.

## 2018-03-12 NOTE — PROGRESS NOTE BEHAVIORAL HEALTH - OTHER
not assessed hyperverbal and overproductive but can stop talking when asked to labile Restricted Paranoid

## 2018-03-13 RX ORDER — OXCARBAZEPINE 300 MG/1
600 TABLET, FILM COATED ORAL
Qty: 0 | Refills: 0 | Status: DISCONTINUED | OUTPATIENT
Start: 2018-03-13 | End: 2018-03-26

## 2018-03-13 RX ADMIN — OXCARBAZEPINE 300 MILLIGRAM(S): 300 TABLET, FILM COATED ORAL at 08:08

## 2018-03-13 RX ADMIN — Medication 1 APPLICATION(S): at 20:14

## 2018-03-13 RX ADMIN — OXCARBAZEPINE 600 MILLIGRAM(S): 300 TABLET, FILM COATED ORAL at 20:14

## 2018-03-13 RX ADMIN — Medication 2 MILLIGRAM(S): at 20:14

## 2018-03-13 RX ADMIN — Medication 50 MILLIGRAM(S): at 21:37

## 2018-03-13 RX ADMIN — ARIPIPRAZOLE 30 MILLIGRAM(S): 15 TABLET ORAL at 08:08

## 2018-03-13 RX ADMIN — Medication 1 APPLICATION(S): at 08:08

## 2018-03-13 RX ADMIN — Medication 1 APPLICATION(S): at 12:01

## 2018-03-13 NOTE — PROGRESS NOTE BEHAVIORAL HEALTH - MODIFICATIONS
-
meds to be adjusted as indicated
_
continue tx plan
pt is intrusive and paranoid, with ideas of reference.

## 2018-03-13 NOTE — PROGRESS NOTE BEHAVIORAL HEALTH - PROBLEM SELECTOR PROBLEM 1
Bipolar 1 disorder, mixed, severe
Bipolar 1 disorder
Bipolar 1 disorder, mixed, severe
Bipolar 1 disorder
Bipolar 1 disorder, mixed, severe

## 2018-03-13 NOTE — PROGRESS NOTE BEHAVIORAL HEALTH - NSBHFUPINTERVALHXFT_PSY_A_CORE
Patient seen and examined. Patient reports that she is doing well. Patient states that she is trying to control how quickly she is talking. Patient reports that her mood is usually up and down but feels like she is in the middle right now. Patient quickly changes the topic to discuss a song she likes and presents her door which is filled with post-its with phrases all over. Patient attended the skills group and was an active participant. However it was difficult to redirect patient as she was chatting with other patients and interrupting often.

## 2018-03-13 NOTE — PROGRESS NOTE BEHAVIORAL HEALTH - CASE SUMMARY
bipolar disorder
bipolar manic; not yet stable for d/c
reamnis manic and psychotic
nandini/psychosis; not yet stable for dc
pt remains hypomanic and psychotic; will increase trileptal and discuss again with outpt provider

## 2018-03-13 NOTE — PROGRESS NOTE BEHAVIORAL HEALTH - PROBLEM SELECTOR PLAN 1
increased abilify to 30mg daily and increased klonopin to 1mg BID
adjust meds as indicated
increase abilify to 15mg Q daily, increase ativan to 2mg PRN for sleep
c/w current management
Increased Trileptal to 600 mg BID po for mood stabilization
1. Continue current medications  2. Consider mood stabilizer optimization/addition of mood stabilizer, will be discussed with patient
continue current meds; added trileptal 300mg BID, klonopin 1mg Q6h PRN and klonopin 1mg QHS

## 2018-03-13 NOTE — PROGRESS NOTE BEHAVIORAL HEALTH - SUMMARY
manic sx due to med non compliance
This is a 32yo single,  female, domiciled in private residence with her brother, works as a dental hygienist, with a history of bipolar disorder and noncompliant with medications for 2 years, who was brought in [facility] for acting agitated and aggressive in [employer]. She requires inpatient psychiatric hospitalization for safety and stabilization as she is acutely manic with impaired reality testing.
This is a 34yo single,  female, domiciled in private residence with her brother, works as a dental hygienist, with a history of bipolar disorder and noncompliant with medications for 2 years, who was brought in [facility] for acting agitated and aggressive in [employer]. She requires inpatient psychiatric hospitalization for safety and stabilization as she is acutely manic with impaired reality testing.
33 year old female with pphx of bipolar disorder currently in a mixed episode remains tangential with pressured speech, and intrusive behavior.
33 year old female with pphx of bipolar disorder currently in a mixed episode presents with pressured speech, paranoid delusions and intrusive behavior. Patient may benefit from medication adjustment to address these symptoms.
This is a 32yo single,  female, domiciled in private residence with her brother, works as a dental hygienist, with a history of bipolar disorder and noncompliant with medications for 2 years, who was brought in [facility] for acting agitated and aggressive in [employer]. She requires inpatient psychiatric hospitalization for safety and stabilization as she is acutely manic with impaired reality testing.
This is a 34yo single,  female, domiciled in private residence with her brother, works as a dental hygienist, with a history of bipolar disorder and noncompliant with medications for 2 years, who was brought in [facility] for acting agitated and aggressive in [employer]. She requires inpatient psychiatric hospitalization for safety and stabilization as she is acutely manic with impaired reality testing.
This is a 32yo single,  female, domiciled in private residence with her brother, works as a dental hygienist, with a history of bipolar disorder and noncompliant with medications for 2 years, who was brought in [facility] for acting agitated and aggressive in [employer]. She requires inpatient psychiatric hospitalization for safety and stabilization as she is acutely manic with impaired reality testing.

## 2018-03-14 RX ORDER — ARIPIPRAZOLE 15 MG/1
15 TABLET ORAL AT BEDTIME
Qty: 0 | Refills: 0 | Status: DISCONTINUED | OUTPATIENT
Start: 2018-03-14 | End: 2018-03-20

## 2018-03-14 RX ORDER — QUETIAPINE FUMARATE 200 MG/1
75 TABLET, FILM COATED ORAL
Qty: 0 | Refills: 0 | Status: DISCONTINUED | OUTPATIENT
Start: 2018-03-14 | End: 2018-03-15

## 2018-03-14 RX ADMIN — Medication 1 APPLICATION(S): at 08:31

## 2018-03-14 RX ADMIN — Medication 1 APPLICATION(S): at 12:17

## 2018-03-14 RX ADMIN — OXCARBAZEPINE 600 MILLIGRAM(S): 300 TABLET, FILM COATED ORAL at 20:07

## 2018-03-14 RX ADMIN — QUETIAPINE FUMARATE 75 MILLIGRAM(S): 200 TABLET, FILM COATED ORAL at 20:07

## 2018-03-14 RX ADMIN — ARIPIPRAZOLE 30 MILLIGRAM(S): 15 TABLET ORAL at 08:31

## 2018-03-14 RX ADMIN — OXCARBAZEPINE 600 MILLIGRAM(S): 300 TABLET, FILM COATED ORAL at 08:31

## 2018-03-14 RX ADMIN — Medication 2 MILLIGRAM(S): at 20:07

## 2018-03-14 RX ADMIN — ARIPIPRAZOLE 15 MILLIGRAM(S): 15 TABLET ORAL at 20:06

## 2018-03-14 RX ADMIN — Medication 1 APPLICATION(S): at 20:08

## 2018-03-14 RX ADMIN — Medication 2 MILLIGRAM(S): at 11:05

## 2018-03-14 NOTE — PROGRESS NOTE BEHAVIORAL HEALTH - NSBHFUPINTERVALCCFT_PSY_A_CORE
pt is intrusive and paranoid, without any insight. Mood is euphoric. Writing notes about how people are after her and how she feels she is in danger

## 2018-03-14 NOTE — PROGRESS NOTE BEHAVIORAL HEALTH - OTHER
not assessed hyperverbal and overproductive but can stop talking when asked to, at times labile Restricted paranoid

## 2018-03-15 RX ORDER — QUETIAPINE FUMARATE 200 MG/1
25 TABLET, FILM COATED ORAL DAILY
Qty: 0 | Refills: 0 | Status: DISCONTINUED | OUTPATIENT
Start: 2018-03-15 | End: 2018-03-16

## 2018-03-15 RX ORDER — QUETIAPINE FUMARATE 200 MG/1
100 TABLET, FILM COATED ORAL AT BEDTIME
Qty: 0 | Refills: 0 | Status: DISCONTINUED | OUTPATIENT
Start: 2018-03-15 | End: 2018-03-16

## 2018-03-15 RX ADMIN — QUETIAPINE FUMARATE 100 MILLIGRAM(S): 200 TABLET, FILM COATED ORAL at 21:26

## 2018-03-15 RX ADMIN — ARIPIPRAZOLE 15 MILLIGRAM(S): 15 TABLET ORAL at 21:26

## 2018-03-15 RX ADMIN — OXCARBAZEPINE 600 MILLIGRAM(S): 300 TABLET, FILM COATED ORAL at 21:26

## 2018-03-15 RX ADMIN — OXCARBAZEPINE 600 MILLIGRAM(S): 300 TABLET, FILM COATED ORAL at 08:04

## 2018-03-15 RX ADMIN — QUETIAPINE FUMARATE 75 MILLIGRAM(S): 200 TABLET, FILM COATED ORAL at 08:04

## 2018-03-15 RX ADMIN — Medication 1 APPLICATION(S): at 21:29

## 2018-03-16 RX ORDER — QUETIAPINE FUMARATE 200 MG/1
125 TABLET, FILM COATED ORAL AT BEDTIME
Qty: 0 | Refills: 0 | Status: COMPLETED | OUTPATIENT
Start: 2018-03-16 | End: 2018-03-16

## 2018-03-16 RX ORDER — QUETIAPINE FUMARATE 200 MG/1
150 TABLET, FILM COATED ORAL AT BEDTIME
Qty: 0 | Refills: 0 | Status: DISCONTINUED | OUTPATIENT
Start: 2018-03-17 | End: 2018-03-19

## 2018-03-16 RX ADMIN — Medication 1 APPLICATION(S): at 13:42

## 2018-03-16 RX ADMIN — OXCARBAZEPINE 600 MILLIGRAM(S): 300 TABLET, FILM COATED ORAL at 08:00

## 2018-03-16 RX ADMIN — QUETIAPINE FUMARATE 125 MILLIGRAM(S): 200 TABLET, FILM COATED ORAL at 20:10

## 2018-03-16 RX ADMIN — OXCARBAZEPINE 600 MILLIGRAM(S): 300 TABLET, FILM COATED ORAL at 20:10

## 2018-03-16 RX ADMIN — Medication 1 APPLICATION(S): at 08:01

## 2018-03-16 RX ADMIN — QUETIAPINE FUMARATE 25 MILLIGRAM(S): 200 TABLET, FILM COATED ORAL at 08:00

## 2018-03-16 RX ADMIN — Medication 2 MILLIGRAM(S): at 19:06

## 2018-03-16 RX ADMIN — ARIPIPRAZOLE 15 MILLIGRAM(S): 15 TABLET ORAL at 20:10

## 2018-03-16 NOTE — PROGRESS NOTE BEHAVIORAL HEALTH - NSBHFUPINTERVALCCFT_PSY_A_CORE
pt is less intrusive; remains euphoric. Less concerned /preoccupied with microphones ("yes? , no, I don't know"

## 2018-03-17 RX ORDER — MICONAZOLE NITRATE 2 %
1 CREAM (GRAM) TOPICAL EVERY 12 HOURS
Qty: 0 | Refills: 0 | Status: DISCONTINUED | OUTPATIENT
Start: 2018-03-17 | End: 2018-03-26

## 2018-03-17 RX ADMIN — Medication 50 MILLIGRAM(S): at 16:00

## 2018-03-17 RX ADMIN — OXCARBAZEPINE 600 MILLIGRAM(S): 300 TABLET, FILM COATED ORAL at 08:05

## 2018-03-17 RX ADMIN — Medication 1 APPLICATION(S): at 08:05

## 2018-03-17 RX ADMIN — Medication 2 MILLIGRAM(S): at 16:00

## 2018-03-17 RX ADMIN — OXCARBAZEPINE 600 MILLIGRAM(S): 300 TABLET, FILM COATED ORAL at 20:10

## 2018-03-17 RX ADMIN — Medication 1 APPLICATION(S): at 20:09

## 2018-03-17 RX ADMIN — ARIPIPRAZOLE 15 MILLIGRAM(S): 15 TABLET ORAL at 20:09

## 2018-03-17 RX ADMIN — Medication 1 APPLICATION(S): at 20:10

## 2018-03-17 RX ADMIN — QUETIAPINE FUMARATE 150 MILLIGRAM(S): 200 TABLET, FILM COATED ORAL at 20:10

## 2018-03-17 NOTE — CHART NOTE - NSCHARTNOTEFT_GEN_A_CORE
Pt has eczema on bilateral flanks and lower ext shin areas.  Pt states dry skin is itchy. Rx miconaolze cream

## 2018-03-17 NOTE — PROGRESS NOTE BEHAVIORAL HEALTH - NSBHFUPINTERVALHXFT_PSY_A_CORE
Pt appears guarded, hypervigilant and paranoid but currently denies any suicidal or homicidal ideation, compliant with treatment and responsive to staff's verbal redirection. Pt appears guarded, hypervigilant and paranoid, currently denies any suicidal or homicidal ideation, compliant with treatment and responsive to staff's verbal redirection.

## 2018-03-17 NOTE — PROGRESS NOTE BEHAVIORAL HEALTH - NSBHFUPINTERVALCCFT_PSY_A_CORE
Pt seen in her room lying in bed states, "She is very concerned that Latuda is not covered by her insurance and she woulfe have a problem with it when she goes outpatient. " Pt was referred by staff nurse for evaluation of for medication due to currently agitated,, intrusive, disruptive, believes that another female patient is bothering her and is getting her into trouble.  Pt was responsive to redirection and agrees to take her PRN medication voluntarily as offered by staff nurse.

## 2018-03-17 NOTE — PROGRESS NOTE BEHAVIORAL HEALTH - NSBHFUPINTERVALHXFT_PSY_A_CORE
Pt reported that she feels better, states at times she could control herself.  Pt has been less intrusive and less disruptive but continues to have labile mood and behavior currently denies any suicidal or homicidal ideation, compliant with treatment and responsive to staff's verbal redirection.

## 2018-03-18 RX ADMIN — OXCARBAZEPINE 600 MILLIGRAM(S): 300 TABLET, FILM COATED ORAL at 20:23

## 2018-03-18 RX ADMIN — OXCARBAZEPINE 600 MILLIGRAM(S): 300 TABLET, FILM COATED ORAL at 08:16

## 2018-03-18 RX ADMIN — Medication 2 MILLIGRAM(S): at 15:47

## 2018-03-18 RX ADMIN — QUETIAPINE FUMARATE 150 MILLIGRAM(S): 200 TABLET, FILM COATED ORAL at 20:24

## 2018-03-18 RX ADMIN — ARIPIPRAZOLE 15 MILLIGRAM(S): 15 TABLET ORAL at 20:24

## 2018-03-18 NOTE — PROGRESS NOTE BEHAVIORAL HEALTH - NSBHCHARTREVIEWVS_PSY_A_CORE FT
Vital Signs Last 24 Hrs    T(F): 95.9 (13 Mar 2018 05:23), Max: 96.5 (12 Mar 2018 18:27)  HR: 105 (13 Mar 2018 05:23) (105 - 115)  BP: 127/83 (13 Mar 2018 05:23) (127/83 - 128/80)  RR: 18 (13 Mar 2018 05:23) (18 - 18)
Vital Signs Last 24 Hrs    T(F): 98.6 (12 Mar 2018 10:20), Max: 98.6 (12 Mar 2018 10:20)  HR: 107 (12 Mar 2018 10:20) (107 - 109)  BP: 132/83 (12 Mar 2018 10:20) (132/83 - 138/93)  RR: 18 (12 Mar 2018 10:20) (18 - 20)
Vital Signs Last 24 Hrs  T(C): 35.7 (18 Mar 2018 10:30), Max: 36.7 (18 Mar 2018 05:38)  T(F): 96.3 (18 Mar 2018 10:30), Max: 98 (18 Mar 2018 05:38)  HR: 112 (18 Mar 2018 10:30) (96 - 124)  BP: 131/81 (18 Mar 2018 10:30) (124/64 - 134/93)  BP(mean): --  RR: 16 (18 Mar 2018 10:30) (16 - 18)  SpO2: --
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
## 2018-03-19 RX ORDER — QUETIAPINE FUMARATE 200 MG/1
250 TABLET, FILM COATED ORAL AT BEDTIME
Qty: 0 | Refills: 0 | Status: DISCONTINUED | OUTPATIENT
Start: 2018-03-19 | End: 2018-03-20

## 2018-03-19 RX ADMIN — OXCARBAZEPINE 600 MILLIGRAM(S): 300 TABLET, FILM COATED ORAL at 08:01

## 2018-03-19 RX ADMIN — OXCARBAZEPINE 600 MILLIGRAM(S): 300 TABLET, FILM COATED ORAL at 20:43

## 2018-03-19 RX ADMIN — Medication 1 APPLICATION(S): at 20:44

## 2018-03-19 RX ADMIN — ARIPIPRAZOLE 15 MILLIGRAM(S): 15 TABLET ORAL at 20:43

## 2018-03-19 RX ADMIN — QUETIAPINE FUMARATE 250 MILLIGRAM(S): 200 TABLET, FILM COATED ORAL at 20:43

## 2018-03-19 NOTE — PROGRESS NOTE BEHAVIORAL HEALTH - NSBHFUPMEDSE_PSY_A_CORE
None known

## 2018-03-20 RX ORDER — QUETIAPINE FUMARATE 200 MG/1
350 TABLET, FILM COATED ORAL AT BEDTIME
Qty: 0 | Refills: 0 | Status: DISCONTINUED | OUTPATIENT
Start: 2018-03-20 | End: 2018-03-21

## 2018-03-20 RX ORDER — ARIPIPRAZOLE 15 MG/1
10 TABLET ORAL
Qty: 0 | Refills: 0 | Status: DISCONTINUED | OUTPATIENT
Start: 2018-03-20 | End: 2018-03-22

## 2018-03-20 RX ADMIN — Medication 1 APPLICATION(S): at 20:15

## 2018-03-20 RX ADMIN — OXCARBAZEPINE 600 MILLIGRAM(S): 300 TABLET, FILM COATED ORAL at 20:09

## 2018-03-20 RX ADMIN — QUETIAPINE FUMARATE 350 MILLIGRAM(S): 200 TABLET, FILM COATED ORAL at 20:08

## 2018-03-20 RX ADMIN — Medication 50 MILLIGRAM(S): at 19:28

## 2018-03-20 RX ADMIN — Medication 50 MILLIGRAM(S): at 01:18

## 2018-03-20 RX ADMIN — Medication 2 MILLIGRAM(S): at 01:18

## 2018-03-20 RX ADMIN — OXCARBAZEPINE 600 MILLIGRAM(S): 300 TABLET, FILM COATED ORAL at 08:13

## 2018-03-20 NOTE — PROGRESS NOTE BEHAVIORAL HEALTH - NSBHFUPINTERVALCCFT_PSY_A_CORE
pt is compliant with meds. Markedly less psychotic and less intrusive.  Pt attending DBT group and expresses that she benefits from it.

## 2018-03-21 RX ORDER — QUETIAPINE FUMARATE 200 MG/1
450 TABLET, FILM COATED ORAL AT BEDTIME
Qty: 0 | Refills: 0 | Status: DISCONTINUED | OUTPATIENT
Start: 2018-03-21 | End: 2018-03-22

## 2018-03-21 RX ADMIN — ARIPIPRAZOLE 10 MILLIGRAM(S): 15 TABLET ORAL at 08:47

## 2018-03-21 RX ADMIN — Medication 1 APPLICATION(S): at 13:01

## 2018-03-21 RX ADMIN — QUETIAPINE FUMARATE 450 MILLIGRAM(S): 200 TABLET, FILM COATED ORAL at 20:36

## 2018-03-21 RX ADMIN — OXCARBAZEPINE 600 MILLIGRAM(S): 300 TABLET, FILM COATED ORAL at 20:37

## 2018-03-21 RX ADMIN — Medication 1 APPLICATION(S): at 20:38

## 2018-03-21 RX ADMIN — Medication 1 APPLICATION(S): at 08:47

## 2018-03-21 RX ADMIN — Medication 50 MILLIGRAM(S): at 23:41

## 2018-03-21 RX ADMIN — Medication 1 APPLICATION(S): at 20:37

## 2018-03-21 RX ADMIN — OXCARBAZEPINE 600 MILLIGRAM(S): 300 TABLET, FILM COATED ORAL at 08:47

## 2018-03-21 RX ADMIN — Medication 2 MILLIGRAM(S): at 23:41

## 2018-03-21 NOTE — PROGRESS NOTE BEHAVIORAL HEALTH - NSBHFUPINTERVALCCFT_PSY_A_CORE
pt complying with meds.  No s/h ideation. No c/o med side effects.  Remains euphoric but can be redirected. Does not c/o microphones but does feel that staff here doesn't like her

## 2018-03-22 RX ORDER — QUETIAPINE FUMARATE 200 MG/1
50 TABLET, FILM COATED ORAL
Qty: 0 | Refills: 0 | Status: DISCONTINUED | OUTPATIENT
Start: 2018-03-22 | End: 2018-03-23

## 2018-03-22 RX ORDER — SODIUM CHLORIDE 0.65 %
1 AEROSOL, SPRAY (ML) NASAL EVERY 4 HOURS
Qty: 0 | Refills: 0 | Status: DISCONTINUED | OUTPATIENT
Start: 2018-03-22 | End: 2018-03-26

## 2018-03-22 RX ORDER — QUETIAPINE FUMARATE 200 MG/1
450 TABLET, FILM COATED ORAL AT BEDTIME
Qty: 0 | Refills: 0 | Status: DISCONTINUED | OUTPATIENT
Start: 2018-03-22 | End: 2018-03-23

## 2018-03-22 RX ADMIN — Medication 1 APPLICATION(S): at 08:02

## 2018-03-22 RX ADMIN — OXCARBAZEPINE 600 MILLIGRAM(S): 300 TABLET, FILM COATED ORAL at 20:08

## 2018-03-22 RX ADMIN — QUETIAPINE FUMARATE 450 MILLIGRAM(S): 200 TABLET, FILM COATED ORAL at 20:07

## 2018-03-22 RX ADMIN — OXCARBAZEPINE 600 MILLIGRAM(S): 300 TABLET, FILM COATED ORAL at 08:02

## 2018-03-22 RX ADMIN — Medication 1 APPLICATION(S): at 12:59

## 2018-03-22 RX ADMIN — Medication 2 MILLIGRAM(S): at 17:51

## 2018-03-22 RX ADMIN — ARIPIPRAZOLE 10 MILLIGRAM(S): 15 TABLET ORAL at 08:02

## 2018-03-23 PROBLEM — F31.9 BIPOLAR DISORDER, UNSPECIFIED: Chronic | Status: ACTIVE | Noted: 2018-02-13

## 2018-03-23 RX ORDER — QUETIAPINE FUMARATE 200 MG/1
100 TABLET, FILM COATED ORAL
Qty: 0 | Refills: 0 | Status: DISCONTINUED | OUTPATIENT
Start: 2018-03-23 | End: 2018-03-26

## 2018-03-23 RX ORDER — QUETIAPINE FUMARATE 200 MG/1
500 TABLET, FILM COATED ORAL AT BEDTIME
Qty: 0 | Refills: 0 | Status: DISCONTINUED | OUTPATIENT
Start: 2018-03-23 | End: 2018-03-26

## 2018-03-23 RX ADMIN — Medication 50 MILLIGRAM(S): at 23:31

## 2018-03-23 RX ADMIN — OXCARBAZEPINE 600 MILLIGRAM(S): 300 TABLET, FILM COATED ORAL at 20:17

## 2018-03-23 RX ADMIN — Medication 1 APPLICATION(S): at 20:16

## 2018-03-23 RX ADMIN — OXCARBAZEPINE 600 MILLIGRAM(S): 300 TABLET, FILM COATED ORAL at 08:03

## 2018-03-23 RX ADMIN — Medication 1 SPRAY(S): at 10:13

## 2018-03-23 RX ADMIN — QUETIAPINE FUMARATE 50 MILLIGRAM(S): 200 TABLET, FILM COATED ORAL at 08:03

## 2018-03-23 RX ADMIN — QUETIAPINE FUMARATE 500 MILLIGRAM(S): 200 TABLET, FILM COATED ORAL at 20:19

## 2018-03-23 NOTE — PROGRESS NOTE BEHAVIORAL HEALTH - NSBHCHARTREVIEWINVESTIGATE_PSY_A_CORE FT
< from: 12 Lead ECG (02.13.18 @ 12:52) >    QTC Calculation(Bezet) 451 ms    < end of copied text >
reviewed

## 2018-03-23 NOTE — PROGRESS NOTE BEHAVIORAL HEALTH - NSBHADMITIPBHPROVFT_PSY_A_CORE
-
outpt provider
phone contact with bren carnes
outpt psychiatrist
-
as noted
Dr. See is aware of patient's status and agrees with plan
outpt provider
will contact
-
dr amezcua
-
-
dr cruz
-
as noted
will contact
will contact
-
dr roman
will contact
-
Dr. See is aware of patient's status and agrees with plan
-
earlier in hospital course
will contact
patient's outpatient psychiatrist was contacted earlier in hospital course

## 2018-03-23 NOTE — PROGRESS NOTE BEHAVIORAL HEALTH - NSBHFUPINTERVALCCFT_PSY_A_CORE
pt complying with meds; no episodes since being off 1:1. Mood euphoric at times.  No overt psychosis. No lability  tolerating seroquel titration  agrees with PHP placement
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
## 2018-03-24 RX ORDER — HALOPERIDOL DECANOATE 100 MG/ML
5 INJECTION INTRAMUSCULAR ONCE
Qty: 0 | Refills: 0 | Status: COMPLETED | OUTPATIENT
Start: 2018-03-24 | End: 2018-03-24

## 2018-03-24 RX ADMIN — OXCARBAZEPINE 600 MILLIGRAM(S): 300 TABLET, FILM COATED ORAL at 08:14

## 2018-03-24 RX ADMIN — Medication 2 MILLIGRAM(S): at 07:46

## 2018-03-24 RX ADMIN — Medication 50 MILLIGRAM(S): at 21:06

## 2018-03-24 RX ADMIN — Medication 1 APPLICATION(S): at 20:35

## 2018-03-24 RX ADMIN — QUETIAPINE FUMARATE 500 MILLIGRAM(S): 200 TABLET, FILM COATED ORAL at 20:39

## 2018-03-24 RX ADMIN — Medication 1 APPLICATION(S): at 20:38

## 2018-03-24 RX ADMIN — Medication 1 APPLICATION(S): at 13:30

## 2018-03-24 RX ADMIN — Medication 2 MILLIGRAM(S): at 21:06

## 2018-03-24 RX ADMIN — HALOPERIDOL DECANOATE 5 MILLIGRAM(S): 100 INJECTION INTRAMUSCULAR at 00:27

## 2018-03-24 RX ADMIN — OXCARBAZEPINE 600 MILLIGRAM(S): 300 TABLET, FILM COATED ORAL at 20:38

## 2018-03-24 RX ADMIN — QUETIAPINE FUMARATE 100 MILLIGRAM(S): 200 TABLET, FILM COATED ORAL at 08:14

## 2018-03-25 RX ADMIN — OXCARBAZEPINE 600 MILLIGRAM(S): 300 TABLET, FILM COATED ORAL at 08:03

## 2018-03-25 RX ADMIN — Medication 1 SPRAY(S): at 08:04

## 2018-03-25 RX ADMIN — Medication 1 APPLICATION(S): at 20:32

## 2018-03-25 RX ADMIN — Medication 2 MILLIGRAM(S): at 16:58

## 2018-03-25 RX ADMIN — Medication 1 APPLICATION(S): at 08:04

## 2018-03-25 RX ADMIN — QUETIAPINE FUMARATE 100 MILLIGRAM(S): 200 TABLET, FILM COATED ORAL at 08:03

## 2018-03-25 RX ADMIN — QUETIAPINE FUMARATE 500 MILLIGRAM(S): 200 TABLET, FILM COATED ORAL at 20:31

## 2018-03-25 RX ADMIN — OXCARBAZEPINE 600 MILLIGRAM(S): 300 TABLET, FILM COATED ORAL at 20:32

## 2018-03-25 NOTE — PROGRESS NOTE BEHAVIORAL HEALTH - RELATEDNESS
Poor/Good
Fair
Poor
Fair
Poor/Good
Poor/Good
Good/Poor
Good/Poor
Poor
Poor/Good
Poor/Good
Good/Poor
Poor
Fair
Fair
Good/Poor
Poor
Poor/Good
Fair
Good/Poor
Poor
Poor/Good
Good/Poor
Poor
Poor
Poor/Good

## 2018-03-25 NOTE — PROGRESS NOTE BEHAVIORAL HEALTH - NSBHFUPINTERVALHXFT_PSY_A_CORE
Pt reported feeling better, less manic and in better control, less intrusive and  less labile currently denies asny suicidadlmor homicidal ideation, compliant with treatment and more responsive to staff's verbal  redirection.

## 2018-03-25 NOTE — PROGRESS NOTE BEHAVIORAL HEALTH - ABNORMAL MOVEMENTS
No abnormal movements

## 2018-03-25 NOTE — PROGRESS NOTE BEHAVIORAL HEALTH - IMPULSE CONTROL
Normal/Impaired
Normal
Impaired
Normal
Normal/Impaired
Normal/Impaired
Impaired
Normal/Impaired
Impaired
Impaired
Normal/Impaired
Impaired
Normal/Impaired
Impaired
Normal
Normal
Normal/Impaired
Normal/Impaired
Impaired
Normal
Normal/Impaired
Normal/Impaired
Impaired
Normal/Impaired
Impaired
Normal/Impaired
Impaired/Normal
Normal/Impaired
Impaired/Normal
Impaired
Impaired
Normal/Impaired
Impaired
Normal/Impaired

## 2018-03-25 NOTE — PROGRESS NOTE BEHAVIORAL HEALTH - NSBHLOC_PSY_A_CORE
Alert

## 2018-03-25 NOTE — PROGRESS NOTE BEHAVIORAL HEALTH - THOUGHT CONTENT
Delusions/Other
Delusions
Delusions/Other
Unremarkable
Other
Other/Unremarkable
Delusions/Other
Other
Delusions/Other
Other/Delusions
Delusions/Other
Other/Delusions
Delusions
Delusions/Other
Other/Delusions
Delusions/Other
Delusions
Other
Delusions/Other
Other/Delusions
Unremarkable
Other
Unremarkable
Delusions/Other
Other
Delusions/Other
Other/Delusions
Other/Delusions
Other

## 2018-03-25 NOTE — PROGRESS NOTE BEHAVIORAL HEALTH - NS ED BHA MED ROS PSYCHIATRIC
See HPI

## 2018-03-25 NOTE — PROGRESS NOTE BEHAVIORAL HEALTH - NS ED BHA MED ROS GASTROINTESTINAL
No complaints
Yes
No complaints
Yes
No complaints

## 2018-03-25 NOTE — PROGRESS NOTE BEHAVIORAL HEALTH - BODY HABITUS
Well nourished

## 2018-03-25 NOTE — PROGRESS NOTE BEHAVIORAL HEALTH - REMOTE MEMORY
Normal

## 2018-03-25 NOTE — PROGRESS NOTE BEHAVIORAL HEALTH - NSBHADMITNEWMED_PSY_A_CORE
no
yes
no
yes
yes
no
no
yes
no

## 2018-03-25 NOTE — PROGRESS NOTE BEHAVIORAL HEALTH - AFFECT CONGRUENCE
Congruent

## 2018-03-25 NOTE — PROGRESS NOTE BEHAVIORAL HEALTH - NSBHCONSORIP_PSY_A_CORE
Inpatient Admission...

## 2018-03-25 NOTE — PROGRESS NOTE BEHAVIORAL HEALTH - LANGUAGE
No abnormalities noted
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
## 2018-03-25 NOTE — PROGRESS NOTE BEHAVIORAL HEALTH - NS ED BHA MSE SPEECH RATE
Other/Pressured
Pressured
Pressured/Other
Pressured
Pressured/Other
Other/Pressured
Other/Pressured
Pressured
Pressured/Other
Pressured
Pressured/Other
Other/Pressured
Other/Pressured
Pressured
Pressured
Other/Pressured
Pressured/Other
Pressured
Pressured/Other
Pressured
Pressured/Other

## 2018-03-25 NOTE — PROGRESS NOTE BEHAVIORAL HEALTH - NSBHADMITIPREASON_PSY_A_CORE
Danger to self; mental illness expected to respond to inpatient care
bipolar illness
Danger to self; mental illness expected to respond to inpatient care

## 2018-03-25 NOTE — PROGRESS NOTE BEHAVIORAL HEALTH - GROOMING
Good

## 2018-03-25 NOTE — PROGRESS NOTE BEHAVIORAL HEALTH - NSBHFUPTYPE_PSY_A_CORE
Inpatient

## 2018-03-25 NOTE — PROGRESS NOTE BEHAVIORAL HEALTH - THOUGHT ASSOCIATIONS
Loose
Normal
Loose
Normal
Loose
Loose
Normal
Normal
Loose
Loose
Normal
Loose
Loose
Normal
Loose
Normal
Loose
Normal

## 2018-03-25 NOTE — PROGRESS NOTE BEHAVIORAL HEALTH - NSBHLEGALSTATUS_PSY_A_CORE
9.27 (2PC)
9.27 (2PC)
9.39 (Emergency)
9.27 (2PC)
9.39 (Emergency)
9.39 (Emergency)
9.27 (2PC)
9.39 (Emergency)
9.39 (Emergency)
9.27 (2PC)
9.39 (Emergency)
9.27 (2PC)
9.27 (2PC)
9.39 (Emergency)
9.39 (Emergency)
9.27 (2PC)
9.39 (Emergency)
9.27 (2PC)
9.39 (Emergency)
9.39 (Emergency)
9.27 (2PC)
9.39 (Emergency)
9.27 (2PC)
9.27 (2PC)
9.39 (Emergency)
9.27 (2PC)

## 2018-03-25 NOTE — PROGRESS NOTE BEHAVIORAL HEALTH - EYE CONTACT
Good/Poor
Fair/Poor
Poor/Good
Fair
Poor/Good
Good/Poor
Poor/Good
Poor/Good
Good/Poor
Poor/Good
Poor/Good
Good/Poor
Poor/Good
Good/Poor
Poor/Fair
Poor/Fair
Poor/Good
Fair/Poor
Poor
Poor/Good
Poor/Good
Good/Poor
Good/Poor
Poor/Good
Good/Poor
Poor/Good
Good/Poor
Poor/Good
Poor/Good
Good/Poor
Poor/Good

## 2018-03-25 NOTE — PROGRESS NOTE BEHAVIORAL HEALTH - NSBHADMITIPDSM_PSY_A_CORE
see above for Axis I, II, III

## 2018-03-25 NOTE — PROGRESS NOTE BEHAVIORAL HEALTH - NS ED BHA AXIS I PRIMARY CODE FT
F31.63
F31.2
F31.2
F31.63
F31.2
F31.63
F31.2
F31.63
F31.2
F31.63
F31.2
F31.63
F31.9
F31.2
F31.63

## 2018-03-25 NOTE — PROGRESS NOTE BEHAVIORAL HEALTH - NS ED BHA MED ROS CONSTITUTIONAL SYMPTOMS
No complaints

## 2018-03-25 NOTE — PROGRESS NOTE BEHAVIORAL HEALTH - AFFECT RANGE
Labile/Constricted
Labile/Constricted
Other/Labile
Labile/Other
Full/Constricted
Full
Full
Labile/Other
Full
Constricted/Labile
Constricted/Full
Labile/Constricted
Labile/Constricted/Full
Full
Full
Labile/Constricted
Labile/Constricted
Constricted/Labile
Full
Labile/Constricted
Labile/Constricted
Other
Labile/Constricted
Labile/Other
Labile/Other
Other
Other
Other/Labile
Constricted/Labile
Other
Full
Constricted/Labile
Labile/Constricted
Constricted/Labile
Labile/Constricted
Other

## 2018-03-25 NOTE — PROGRESS NOTE BEHAVIORAL HEALTH - ATTENTION / CONCENTRATION
Impaired
Normal
Normal
Impaired
Normal
Impaired
Normal
Impaired
Normal
Impaired
Impaired
Normal
Impaired
Normal
Impaired
Normal
Impaired
Admitted

## 2018-03-25 NOTE — PROGRESS NOTE BEHAVIORAL HEALTH - AXIS III
none

## 2018-03-25 NOTE — PROGRESS NOTE BEHAVIORAL HEALTH - PRIMARY DX
Bipolar 1 disorder, mixed, severe
Bipolar 1 disorder, mixed, severe
Bipolar affective disorder, currently manic, severe, with psychotic features
Bipolar affective disorder, currently manic, severe, with psychotic features
Bipolar 1 disorder, mixed, severe
Bipolar affective disorder, currently manic, severe, with psychotic features
Bipolar 1 disorder, mixed, severe
Bipolar affective disorder, currently manic, severe, with psychotic features
Bipolar 1 disorder, mixed, severe
Bipolar affective disorder, currently manic, severe, with psychotic features
Bipolar 1 disorder, mixed, severe
Bipolar affective disorder, currently manic, severe, with psychotic features
Bipolar 1 disorder, mixed, severe
Bipolar affective disorder, currently manic, severe, with psychotic features
Bipolar 1 disorder
Bipolar affective disorder, currently manic, severe, with psychotic features
Bipolar 1 disorder, mixed, severe

## 2018-03-25 NOTE — PROGRESS NOTE BEHAVIORAL HEALTH - NS ED BHA MSE GENERAL APPEARANCE
Well developed

## 2018-03-25 NOTE — PROGRESS NOTE BEHAVIORAL HEALTH - BEHAVIOR
Cooperative
Uncooperative
Cooperative
Uncooperative
Cooperative
Uncooperative
Cooperative
Uncooperative
Cooperative
Uncooperative

## 2018-03-25 NOTE — PROGRESS NOTE BEHAVIORAL HEALTH - NSBHADMITIPBHPROVIDER_PSY_A_CORE
yes
yes
no
N/A
N/A
yes
spoke to pt's psychiatrist Dr. See/yes
yes
left message for patient's psychiatrist- Dr. See/yes
yes
yes
no
N/A
N/A
yes
yes/left message for patient's psychiatrist- Dr. See
no
yes
yes
N/A
yes/left message for patient's psychiatrist- Dr. See
yes/left message for patient's psychiatrist- Dr. See
N/A
yes
yes
yes/left message for patient's psychiatrist- Dr. See
no
yes
yes/spoke to pt's psychiatrist Dr. See
yes

## 2018-03-25 NOTE — PROGRESS NOTE BEHAVIORAL HEALTH - PERCEPTIONS
No abnormalities

## 2018-03-25 NOTE — PROGRESS NOTE BEHAVIORAL HEALTH - GAIT / STATION
Normal gait / station

## 2018-03-25 NOTE — PROGRESS NOTE BEHAVIORAL HEALTH - NSBHFUPSUICINTERVAL_PSY_A_CORE
none known

## 2018-03-25 NOTE — PROGRESS NOTE BEHAVIORAL HEALTH - INSIGHT (REGARDING PSYCHIATRIC ILLNESS)
Poor
Fair
Fair
Poor
Fair
Poor
Fair
Poor
Fair
Poor
Fair
Poor
Poor
Fair
Poor
Poor
Fair
Poor

## 2018-03-25 NOTE — PROGRESS NOTE BEHAVIORAL HEALTH - NSBHADMITIPOBS_PSY_A_CORE
Routine observation
Constant observation
Routine observation
Constant observation
Constant observation
Routine observation
Constant observation
Constant observation
Routine observation
Constant observation
Constant observation
Routine observation
Constant observation
Routine observation

## 2018-03-25 NOTE — PROGRESS NOTE BEHAVIORAL HEALTH - JUDGMENT (REGARDING EVERYDAY EVENTS)
Poor
Fair
Fair
Poor
Fair
Poor
Fair
Poor
Fair
Poor
Fair
Poor
Poor
Fair
Poor

## 2018-03-25 NOTE — PROGRESS NOTE BEHAVIORAL HEALTH - NS ED BHA MSE SPEECH VOLUME
Loud/Normal
Normal
Loud/Normal
Other
Normal
Normal/Loud
Loud/Normal
Normal
Normal
Other
Normal/Loud
Other
Other
Normal
Other
Loud/Normal
Normal
Normal
Normal/Loud
Other
Other
Loud/Normal
Normal
Normal/Loud
Other
Other
Loud/Normal
Normal
Other
Other
Loud/Normal
Normal/Loud
Other

## 2018-03-25 NOTE — PROGRESS NOTE BEHAVIORAL HEALTH - THOUGHT PROCESS
Disorganized/Tangential/Circumstantial/Flight of ideas
Disorganized/Circumstantial/Tangential/Flight of ideas
Tangential/Circumstantial/Flight of ideas
Circumstantial/Flight of ideas
Disorganized/Tangential/Flight of ideas/Circumstantial
Tangential/Circumstantial/Flight of ideas/Linear
Linear
Flight of ideas/Tangential/Linear/Circumstantial
Tangential/Disorganized/Circumstantial/Flight of ideas
Linear
Circumstantial/Flight of ideas/Tangential/Disorganized
Disorganized/Tangential/Circumstantial/Flight of ideas
Tangential/Disorganized/Flight of ideas/Circumstantial
Tangential/Flight of ideas/Linear/Circumstantial
Linear
Linear
Tangential/Flight of ideas/Disorganized/Circumstantial
Tangential/Flight of ideas/Disorganized/Circumstantial
Circumstantial/Flight of ideas/Tangential
Flight of ideas/Circumstantial/Disorganized/Tangential
Linear/Flight of ideas/Circumstantial/Tangential
Disorganized/Circumstantial/Tangential/Flight of ideas
Flight of ideas/Circumstantial/Tangential/Disorganized
Tangential/Circumstantial/Flight of ideas
Circumstantial/Flight of ideas
Circumstantial/Tangential/Flight of ideas/Disorganized
Flight of ideas/Circumstantial
Circumstantial/Flight of ideas
Tangential/Circumstantial/Disorganized/Flight of ideas
Tangential/Disorganized/Circumstantial/Flight of ideas
Flight of ideas/Circumstantial/Disorganized/Tangential
Tangential/Disorganized/Circumstantial/Flight of ideas
Tangential/Disorganized/Circumstantial/Flight of ideas
Linear
Tangential/Flight of ideas/Disorganized/Circumstantial
Disorganized/Circumstantial/Flight of ideas/Tangential
Disorganized/Tangential/Flight of ideas/Circumstantial
Tangential/Circumstantial/Flight of ideas/Disorganized
Circumstantial/Tangential/Flight of ideas/Disorganized

## 2018-03-25 NOTE — PROGRESS NOTE BEHAVIORAL HEALTH - NSBHADMITMEDEDU_PSY_A_CORE
yes...
no
yes...
no
yes...
no
yes...
no
yes...
yes...
no

## 2018-03-25 NOTE — PROGRESS NOTE BEHAVIORAL HEALTH - ORIENTED TO PLACE
Yes

## 2018-03-25 NOTE — PROGRESS NOTE BEHAVIORAL HEALTH - AFFECT QUALITY
Anxious/Other/Irritable
Elevated/Irritable/Anxious/Other
Irritable/Anxious/Other/Elevated
Euthymic/Other
Irritable/Anxious/Other
Elevated/Anxious/Other/Irritable
Other/Euthymic
Anxious/Euthymic/Irritable/Other
Other/Elevated
Euthymic/Other
Irritable/Anxious/Other
Euthymic/Other/Elevated
Other/Elevated
Other/Elevated
Irritable/Anxious/Other
Other/Elevated
Anxious/Other/Irritable
Elevated/Irritable/Anxious/Other
Irritable/Anxious/Other
Other/Euthymic
Irritable/Anxious/Other/Elevated
Irritable/Elevated/Anxious/Other
Other/Irritable/Anxious
Irritable/Elevated/Other/Anxious
Other
Elevated/Other
Anxious/Other/Irritable
Elevated/Anxious/Irritable/Other
Irritable/Other/Anxious
Elevated/Irritable/Anxious/Other
Irritable/Anxious/Other
Irritable/Other/Anxious
Anxious/Other/Irritable
Irritable/Anxious/Other
Irritable/Anxious/Other
Irritable/Anxious/Other/Elevated

## 2018-03-25 NOTE — PROGRESS NOTE BEHAVIORAL HEALTH - MUSCLE TONE / STRENGTH
Normal muscle tone/strength
Other
Other
Normal muscle tone/strength
Other
Normal muscle tone/strength

## 2018-03-26 VITALS
RESPIRATION RATE: 17 BRPM | DIASTOLIC BLOOD PRESSURE: 81 MMHG | SYSTOLIC BLOOD PRESSURE: 125 MMHG | HEART RATE: 97 BPM | TEMPERATURE: 97 F

## 2018-03-26 RX ORDER — QUETIAPINE FUMARATE 200 MG/1
1 TABLET, FILM COATED ORAL
Qty: 0 | Refills: 0 | COMMUNITY
Start: 2018-03-26

## 2018-03-26 RX ORDER — OXCARBAZEPINE 300 MG/1
1 TABLET, FILM COATED ORAL
Qty: 0 | Refills: 0 | COMMUNITY
Start: 2018-03-26

## 2018-03-26 RX ORDER — QUETIAPINE FUMARATE 200 MG/1
5 TABLET, FILM COATED ORAL
Qty: 0 | Refills: 0 | COMMUNITY
Start: 2018-03-26

## 2018-03-26 RX ORDER — QUETIAPINE FUMARATE 200 MG/1
1 TABLET, FILM COATED ORAL
Qty: 14 | Refills: 0 | OUTPATIENT
Start: 2018-03-26

## 2018-03-26 RX ORDER — QUETIAPINE FUMARATE 200 MG/1
5 TABLET, FILM COATED ORAL
Qty: 70 | Refills: 0 | OUTPATIENT
Start: 2018-03-26

## 2018-03-26 RX ORDER — OXCARBAZEPINE 300 MG/1
1 TABLET, FILM COATED ORAL
Qty: 30 | Refills: 0 | OUTPATIENT
Start: 2018-03-26

## 2018-03-26 RX ADMIN — QUETIAPINE FUMARATE 100 MILLIGRAM(S): 200 TABLET, FILM COATED ORAL at 07:57

## 2018-03-26 RX ADMIN — Medication 1 APPLICATION(S): at 07:57

## 2018-03-26 RX ADMIN — Medication 1 APPLICATION(S): at 07:56

## 2018-03-26 RX ADMIN — Medication 1 SPRAY(S): at 05:12

## 2018-03-26 RX ADMIN — BENZOCAINE AND MENTHOL 1 LOZENGE: 5; 1 LIQUID ORAL at 05:11

## 2018-03-26 RX ADMIN — OXCARBAZEPINE 600 MILLIGRAM(S): 300 TABLET, FILM COATED ORAL at 07:57

## 2018-03-26 NOTE — DISCHARGE NOTE BEHAVIORAL HEALTH - HPI (INCLUDE ILLNESS QUALITY, SEVERITY, DURATION, TIMING, CONTEXT, MODIFYING FACTORS, ASSOCIATED SIGNS AND SYMPTOMS)
long standing psych hx; dx Bipolar Disorder, s/p atleast 4 other hospitalizations due to non compliance> None at this facility before

## 2018-03-26 NOTE — PROGRESS NOTE ADULT - SUBJECTIVE AND OBJECTIVE BOX
no psychosis; no s/h ideation. No med side effects. Pt with slight euphoria in anticipation of going home and starting [facility] tomorrow.  Pt understands need for compliance

## 2018-03-26 NOTE — DISCHARGE NOTE BEHAVIORAL HEALTH - MEDICATION SUMMARY - MEDICATIONS TO TAKE
I will START or STAY ON the medications listed below when I get home from the hospital:    OXcarbazepine 600 mg oral tablet  -- 1 tab(s) by mouth 2 times a day  -- Indication: For Bipolar 1 disorder    QUEtiapine 100 mg oral tablet  -- 1 tab(s) by mouth   -- Indication: For Bipolar 1 disorder    QUEtiapine 100 mg oral tablet  -- 5 tab(s) by mouth once a day (at bedtime)  -- Indication: For Bipolar 1 disorder
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
## 2018-03-28 DIAGNOSIS — F31.9 BIPOLAR DISORDER, UNSPECIFIED: ICD-10-CM

## 2018-03-28 DIAGNOSIS — L60.8 OTHER NAIL DISORDERS: ICD-10-CM

## 2018-03-28 DIAGNOSIS — F22 DELUSIONAL DISORDERS: ICD-10-CM

## 2018-03-28 DIAGNOSIS — L30.9 DERMATITIS, UNSPECIFIED: ICD-10-CM

## 2018-03-28 DIAGNOSIS — R45.851 SUICIDAL IDEATIONS: ICD-10-CM

## 2018-03-28 DIAGNOSIS — L60.3 NAIL DYSTROPHY: ICD-10-CM
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
## 2018-03-28 DIAGNOSIS — Z91.14 PATIENT'S OTHER NONCOMPLIANCE WITH MEDICATION REGIMEN: ICD-10-CM

## 2018-03-28 DIAGNOSIS — S01.81XA LACERATION WITHOUT FOREIGN BODY OF OTHER PART OF HEAD, INITIAL ENCOUNTER: ICD-10-CM

## 2018-04-04 ENCOUNTER — OUTPATIENT (OUTPATIENT)
Dept: OUTPATIENT SERVICES | Facility: HOSPITAL | Age: 33
LOS: 1 days | Discharge: HOME | End: 2018-04-04

## 2018-04-04 DIAGNOSIS — F31.2 BIPOLAR DISORDER, CURRENT EPISODE MANIC SEVERE WITH PSYCHOTIC FEATURES: ICD-10-CM

## 2018-04-06 ENCOUNTER — INPATIENT (INPATIENT)
Facility: HOSPITAL | Age: 33
LOS: 26 days | Discharge: HOME | End: 2018-05-03
Attending: PSYCHIATRY & NEUROLOGY | Admitting: PSYCHIATRY & NEUROLOGY

## 2018-04-06 VITALS
WEIGHT: 158.95 LBS | HEART RATE: 105 BPM | DIASTOLIC BLOOD PRESSURE: 104 MMHG | RESPIRATION RATE: 18 BRPM | OXYGEN SATURATION: 99 % | SYSTOLIC BLOOD PRESSURE: 140 MMHG | HEIGHT: 65 IN | TEMPERATURE: 98 F

## 2018-04-06 DIAGNOSIS — F31.60 BIPOLAR DISORDER, CURRENT EPISODE MIXED, UNSPECIFIED: ICD-10-CM

## 2018-04-06 DIAGNOSIS — F31.9 BIPOLAR DISORDER, UNSPECIFIED: ICD-10-CM

## 2018-04-06 LAB
ALBUMIN SERPL ELPH-MCNC: 4.7 G/DL — SIGNIFICANT CHANGE UP (ref 3.5–5.2)
ALP SERPL-CCNC: 88 U/L — SIGNIFICANT CHANGE UP (ref 30–115)
ALT FLD-CCNC: 13 U/L — SIGNIFICANT CHANGE UP (ref 0–41)
ANION GAP SERPL CALC-SCNC: 13 MMOL/L — SIGNIFICANT CHANGE UP (ref 7–14)
APAP SERPL-MCNC: <5 UG/ML — LOW (ref 10–30)
AST SERPL-CCNC: 15 U/L — SIGNIFICANT CHANGE UP (ref 0–41)
BASOPHILS # BLD AUTO: 0.04 K/UL — SIGNIFICANT CHANGE UP (ref 0–0.2)
BASOPHILS NFR BLD AUTO: 0.4 % — SIGNIFICANT CHANGE UP (ref 0–1)
BILIRUB SERPL-MCNC: 0.2 MG/DL — SIGNIFICANT CHANGE UP (ref 0.2–1.2)
BUN SERPL-MCNC: 12 MG/DL — SIGNIFICANT CHANGE UP (ref 10–20)
CALCIUM SERPL-MCNC: 9.3 MG/DL — SIGNIFICANT CHANGE UP (ref 8.5–10.1)
CARBAMAZEPINE SERPL-MCNC: <2 UG/ML — LOW (ref 4–12)
CHLORIDE SERPL-SCNC: 100 MMOL/L — SIGNIFICANT CHANGE UP (ref 98–110)
CO2 SERPL-SCNC: 24 MMOL/L — SIGNIFICANT CHANGE UP (ref 17–32)
CREAT SERPL-MCNC: 0.7 MG/DL — SIGNIFICANT CHANGE UP (ref 0.7–1.5)
EOSINOPHIL # BLD AUTO: 0.27 K/UL — SIGNIFICANT CHANGE UP (ref 0–0.7)
EOSINOPHIL NFR BLD AUTO: 2.6 % — SIGNIFICANT CHANGE UP (ref 0–8)
ETHANOL SERPL-MCNC: <10 MG/DL — HIGH
GLUCOSE SERPL-MCNC: 120 MG/DL — HIGH (ref 70–99)
HCT VFR BLD CALC: 40.4 % — SIGNIFICANT CHANGE UP (ref 37–47)
HGB BLD-MCNC: 13.5 G/DL — SIGNIFICANT CHANGE UP (ref 12–16)
IMM GRANULOCYTES NFR BLD AUTO: 0.4 % — HIGH (ref 0.1–0.3)
LYMPHOCYTES # BLD AUTO: 1.74 K/UL — SIGNIFICANT CHANGE UP (ref 1.2–3.4)
LYMPHOCYTES # BLD AUTO: 16.6 % — LOW (ref 20.5–51.1)
MCHC RBC-ENTMCNC: 29.9 PG — SIGNIFICANT CHANGE UP (ref 27–31)
MCHC RBC-ENTMCNC: 33.4 G/DL — SIGNIFICANT CHANGE UP (ref 32–37)
MCV RBC AUTO: 89.6 FL — SIGNIFICANT CHANGE UP (ref 81–99)
MONOCYTES # BLD AUTO: 0.72 K/UL — HIGH (ref 0.1–0.6)
MONOCYTES NFR BLD AUTO: 6.9 % — SIGNIFICANT CHANGE UP (ref 1.7–9.3)
NEUTROPHILS # BLD AUTO: 7.69 K/UL — HIGH (ref 1.4–6.5)
NEUTROPHILS NFR BLD AUTO: 73.1 % — SIGNIFICANT CHANGE UP (ref 42.2–75.2)
PLATELET # BLD AUTO: 349 K/UL — SIGNIFICANT CHANGE UP (ref 130–400)
POTASSIUM SERPL-MCNC: 3.6 MMOL/L — SIGNIFICANT CHANGE UP (ref 3.5–5)
POTASSIUM SERPL-SCNC: 3.6 MMOL/L — SIGNIFICANT CHANGE UP (ref 3.5–5)
PROT SERPL-MCNC: 7.7 G/DL — SIGNIFICANT CHANGE UP (ref 6–8)
RBC # BLD: 4.51 M/UL — SIGNIFICANT CHANGE UP (ref 4.2–5.4)
RBC # FLD: 11.9 % — SIGNIFICANT CHANGE UP (ref 11.5–14.5)
SALICYLATES SERPL-MCNC: <0.3 MG/DL — LOW (ref 4–30)
SODIUM SERPL-SCNC: 137 MMOL/L — SIGNIFICANT CHANGE UP (ref 135–146)
WBC # BLD: 10.5 K/UL — SIGNIFICANT CHANGE UP (ref 4.8–10.8)
WBC # FLD AUTO: 10.5 K/UL — SIGNIFICANT CHANGE UP (ref 4.8–10.8)

## 2018-04-06 RX ORDER — ACETAMINOPHEN 500 MG
650 TABLET ORAL EVERY 6 HOURS
Qty: 0 | Refills: 0 | Status: DISCONTINUED | OUTPATIENT
Start: 2018-04-06 | End: 2018-05-03

## 2018-04-06 RX ORDER — HALOPERIDOL DECANOATE 100 MG/ML
5 INJECTION INTRAMUSCULAR ONCE
Qty: 0 | Refills: 0 | Status: COMPLETED | OUTPATIENT
Start: 2018-04-06 | End: 2018-04-06

## 2018-04-06 RX ORDER — QUETIAPINE FUMARATE 200 MG/1
500 TABLET, FILM COATED ORAL AT BEDTIME
Qty: 0 | Refills: 0 | Status: DISCONTINUED | OUTPATIENT
Start: 2018-04-06 | End: 2018-04-12

## 2018-04-06 RX ORDER — PANTOPRAZOLE SODIUM 20 MG/1
40 TABLET, DELAYED RELEASE ORAL
Qty: 0 | Refills: 0 | Status: DISCONTINUED | OUTPATIENT
Start: 2018-04-06 | End: 2018-05-03

## 2018-04-06 RX ORDER — OXCARBAZEPINE 300 MG/1
600 TABLET, FILM COATED ORAL
Qty: 0 | Refills: 0 | Status: DISCONTINUED | OUTPATIENT
Start: 2018-04-06 | End: 2018-05-03

## 2018-04-06 RX ADMIN — Medication 2 MILLIGRAM(S): at 15:00

## 2018-04-06 RX ADMIN — HALOPERIDOL DECANOATE 5 MILLIGRAM(S): 100 INJECTION INTRAMUSCULAR at 15:00

## 2018-04-06 NOTE — BEHAVIORAL HEALTH ASSESSMENT NOTE - HPI (INCLUDE ILLNESS QUALITY, SEVERITY, DURATION, TIMING, CONTEXT, MODIFYING FACTORS, ASSOCIATED SIGNS AND SYMPTOMS)
pt was sent by [facility] for agitated behavior. she has been manic, restless, grandiose, recklessly driving, not sleeping as per family and has been non compliant with her medications. in she is observed to be manic, agitated, grandiose, paranoid and has expansive mood, labile affect and aggressive towards staff. imapired reality testing.

## 2018-04-06 NOTE — H&P ADULT - NSHPPHYSICALEXAM_GEN_ALL_CORE
GENERAL:  34y/o Female NAD, resting comfortably.  HEAD:  Atraumatic, Normocephalic  EYES: EOMI, PERRLA, conjunctiva and sclera clear  NECK: Supple, No JVD, no cervical lymphadenopathy, non-tender  CHEST/LUNG: Clear to auscultation bilaterally; No wheeze, rhonchi, or rales  HEART: Regular rate and rhythm; S1&S2  ABDOMEN: Soft, Nontender, Nondistended x 4 quadrants; Bowel sounds present  EXTREMITIES:   Peripheral Pulses Present, No clubbing, no cyanosis, or no edema, no calf tenderness  PSYCH: AAOx3, cooperative, appropriate  NEUROLOGY: WNL  SKIN: WNL

## 2018-04-06 NOTE — ED PROVIDER NOTE - PHYSICAL EXAMINATION
CONSTITUTIONAL: Well-developed; well-nourished; in no acute distress. Sitting up and providing appropriate history and physical examination  SKIN: skin exam is warm and dry, no acute rash.  HEAD: Normocephalic; atraumatic.  EYES: PERRL, 3 mm bilateral, no nystagmus, EOM intact; conjunctiva and sclera clear.  ENT: No nasal discharge; airway clear.  NECK: Supple; non tender.+ full passive ROM in all directions. No JVD  CARD: S1, S2 normal; no murmurs, gallops, or rubs. Regular rate and rhythm. + Symmetric Strong Pulses  RESP: No wheezes, rales or rhonchi. Good air movement bilaterally  ABD: soft; non-distended; non-tender. No Rebound, No Gaurding, No signs of peritnitis, No CVA tenderness  EXT: Normal ROM. No clubbing, cyanosis or edema. Dp and Pt Pulses intact. Cap refill less than 3 seconds  NEURO: CN 2-12 intact, normal finger to nose, normal romberg, stable gait, no sensory or motor deficits, Alert, oriented, grossly unremarkable. No Focal deficits. GCS 15. NIH 0  PSYCH: Cooperative, denies si or hi, no a/v hallucinations

## 2018-04-06 NOTE — ED PROVIDER NOTE - MEDICAL DECISION MAKING DETAILS
I have personally performed a history and physical exam on this patient and personally directed the management of the patient. Patient admitted to psych.

## 2018-04-06 NOTE — ED PROVIDER NOTE - NS ED ROS FT
Constitutional: See HPI.  Eyes: No visual changes, eye pain or discharge. No Photophobia  ENMT: No hearing changes, pain, discharge or infections. No neck pain or stiffness. No limited ROM  Cardiac: No SOB or edema. No chest pain with exertion.  Respiratory: No cough or respiratory distress. No hemoptysis. No history of asthma or RAD.  GI: No nausea, vomiting, diarrhea or abdominal pain.  : No dysuria, frequency or burning. No Discharge  MS: No myalgia, muscle weakness, joint pain or back pain.  Neuro: No headache or weakness. No LOC.  Skin: No skin rash.  Except as documented in the HPI, all other systems are negative.

## 2018-04-06 NOTE — H&P ADULT - HISTORY OF PRESENT ILLNESS
34 y/o female with past medical history of bypolar and schizoaffective disorder c/o new onset of worsening depression.

## 2018-04-06 NOTE — ED ADULT TRIAGE NOTE - CHIEF COMPLAINT QUOTE
pt states, "not by choice, involuntary" while laughing inappropriately, patient states he hasn't sleep in "30 somethings" patient denies any suicidal thoughts,

## 2018-04-06 NOTE — H&P ADULT - NSHPLABSRESULTS_GEN_ALL_CORE
13.5   10.50 )-----------( 349      ( 06 Apr 2018 14:54 )             40.4       04-06    137  |  100  |  12  ----------------------------<  120<H>  3.6   |  24  |  0.7    Ca    9.3      06 Apr 2018 14:54    TPro  7.7  /  Alb  4.7  /  TBili  0.2  /  DBili  x   /  AST  15  /  ALT  13  /  AlkPhos  88  04-06                      Lactate Trend            CAPILLARY BLOOD GLUCOSE

## 2018-04-06 NOTE — BEHAVIORAL HEALTH ASSESSMENT NOTE - SUMMARY
pt is presenting acute manic psychosis impulsive non compliant with meds needs ipp for further stabilization and safety

## 2018-04-06 NOTE — ED PROVIDER NOTE - OBJECTIVE STATEMENT
33 year old female, + history of psychiatric illness, comes in with agitation. No cp/sob, no n/v/d, no loc, no fever. denies drug use, no si or hi, no a/v hallucinations

## 2018-04-07 RX ADMIN — QUETIAPINE FUMARATE 500 MILLIGRAM(S): 200 TABLET, FILM COATED ORAL at 20:15

## 2018-04-07 RX ADMIN — PANTOPRAZOLE SODIUM 40 MILLIGRAM(S): 20 TABLET, DELAYED RELEASE ORAL at 09:01

## 2018-04-08 RX ADMIN — PANTOPRAZOLE SODIUM 40 MILLIGRAM(S): 20 TABLET, DELAYED RELEASE ORAL at 08:36

## 2018-04-08 RX ADMIN — OXCARBAZEPINE 600 MILLIGRAM(S): 300 TABLET, FILM COATED ORAL at 20:48

## 2018-04-08 RX ADMIN — Medication 2 MILLIGRAM(S): at 09:36

## 2018-04-08 RX ADMIN — QUETIAPINE FUMARATE 500 MILLIGRAM(S): 200 TABLET, FILM COATED ORAL at 20:48

## 2018-04-08 NOTE — PROGRESS NOTE BEHAVIORAL HEALTH - OTHER
tangential hostile, expansive and manic Restricted paranoid ideations currently believes another female patient is after her.

## 2018-04-09 RX ORDER — QUETIAPINE FUMARATE 200 MG/1
100 TABLET, FILM COATED ORAL ONCE
Qty: 0 | Refills: 0 | Status: COMPLETED | OUTPATIENT
Start: 2018-04-09 | End: 2018-04-09

## 2018-04-09 RX ORDER — DIPHENHYDRAMINE HCL 50 MG
50 CAPSULE ORAL ONCE
Qty: 0 | Refills: 0 | Status: COMPLETED | OUTPATIENT
Start: 2018-04-09 | End: 2018-04-09

## 2018-04-09 RX ORDER — BENZOCAINE AND MENTHOL 5; 1 G/100ML; G/100ML
1 LIQUID ORAL EVERY 6 HOURS
Qty: 0 | Refills: 0 | Status: DISCONTINUED | OUTPATIENT
Start: 2018-04-09 | End: 2018-04-20

## 2018-04-09 RX ORDER — ACETAMINOPHEN 500 MG
650 TABLET ORAL EVERY 6 HOURS
Qty: 0 | Refills: 0 | Status: DISCONTINUED | OUTPATIENT
Start: 2018-04-09 | End: 2018-05-03

## 2018-04-09 RX ORDER — QUETIAPINE FUMARATE 200 MG/1
100 TABLET, FILM COATED ORAL DAILY
Qty: 0 | Refills: 0 | Status: DISCONTINUED | OUTPATIENT
Start: 2018-04-09 | End: 2018-04-20

## 2018-04-09 RX ADMIN — BENZOCAINE AND MENTHOL 1 LOZENGE: 5; 1 LIQUID ORAL at 10:38

## 2018-04-09 RX ADMIN — OXCARBAZEPINE 600 MILLIGRAM(S): 300 TABLET, FILM COATED ORAL at 20:33

## 2018-04-09 RX ADMIN — Medication 50 MILLIGRAM(S): at 21:45

## 2018-04-09 RX ADMIN — QUETIAPINE FUMARATE 500 MILLIGRAM(S): 200 TABLET, FILM COATED ORAL at 20:33

## 2018-04-09 RX ADMIN — QUETIAPINE FUMARATE 100 MILLIGRAM(S): 200 TABLET, FILM COATED ORAL at 12:57

## 2018-04-09 RX ADMIN — OXCARBAZEPINE 600 MILLIGRAM(S): 300 TABLET, FILM COATED ORAL at 08:51

## 2018-04-09 RX ADMIN — PANTOPRAZOLE SODIUM 40 MILLIGRAM(S): 20 TABLET, DELAYED RELEASE ORAL at 07:26

## 2018-04-09 RX ADMIN — Medication 650 MILLIGRAM(S): at 21:39

## 2018-04-09 NOTE — PROGRESS NOTE BEHAVIORAL HEALTH - OTHER
tangential "elated" easily distracted paranoid ideations currently believes another female patient is after her.

## 2018-04-09 NOTE — CHART NOTE - NSCHARTNOTEFT_GEN_A_CORE
Social Work Admit Note:    Patient is 33 years of age female who was admitted for evaluation of nandini.  She was referred to the emergency department by Dr. Potts of [facility].  Prior to admission patient was observed to be agitated and manic.  Other symptoms observed were being restless and grandiose.  Per patient's family report, Mariam had poor sleep and had not been taking her medications.  Paranoia was observed at time of assessment in the emergency department.  Affect was labile and she was aggressive toward staff.  Speech was pressured.  Impulse control was impaired.  Insight and judgment are poor.      In the community patient resides with her family.  Parents and brother are supportive.  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  Unclear at this time if she is currently employed.  Alcohol and other substance use denied.  Patient is single, never , and no children.      Mariam was recently discharged from [facility] inpatient psychiatry.  Her last admission was from 02/13 - 03/26 for bipolar disorder.  Plan was for continued treatment at [facility] which she did attend.         will continue to meet with patient 1:1 daily and with treatment team.  Discharge plan to be determined in collaboration with patient, family, and treatment team. Please refer to Social Work Assessment for additional information.

## 2018-04-10 RX ORDER — QUETIAPINE FUMARATE 200 MG/1
0 TABLET, FILM COATED ORAL
Qty: 0 | Refills: 0 | COMMUNITY

## 2018-04-10 RX ADMIN — PANTOPRAZOLE SODIUM 40 MILLIGRAM(S): 20 TABLET, DELAYED RELEASE ORAL at 08:36

## 2018-04-10 RX ADMIN — QUETIAPINE FUMARATE 500 MILLIGRAM(S): 200 TABLET, FILM COATED ORAL at 20:51

## 2018-04-10 RX ADMIN — Medication 600 MILLIGRAM(S): at 14:15

## 2018-04-10 RX ADMIN — Medication 650 MILLIGRAM(S): at 07:03

## 2018-04-10 RX ADMIN — Medication 650 MILLIGRAM(S): at 15:01

## 2018-04-10 RX ADMIN — OXCARBAZEPINE 600 MILLIGRAM(S): 300 TABLET, FILM COATED ORAL at 20:13

## 2018-04-10 RX ADMIN — Medication 600 MILLIGRAM(S): at 20:13

## 2018-04-10 RX ADMIN — QUETIAPINE FUMARATE 100 MILLIGRAM(S): 200 TABLET, FILM COATED ORAL at 08:36

## 2018-04-10 RX ADMIN — OXCARBAZEPINE 600 MILLIGRAM(S): 300 TABLET, FILM COATED ORAL at 08:36

## 2018-04-10 RX ADMIN — Medication 650 MILLIGRAM(S): at 16:00

## 2018-04-10 NOTE — PROGRESS NOTE BEHAVIORAL HEALTH - OTHER
intrusive, visible on unit tangential "good" easily distracted paranoid thoughts about previous provider admitting her bc her nose was bleeding

## 2018-04-11 RX ORDER — DIPHENHYDRAMINE HCL 50 MG
50 CAPSULE ORAL ONCE
Qty: 0 | Refills: 0 | Status: COMPLETED | OUTPATIENT
Start: 2018-04-11 | End: 2018-04-11

## 2018-04-11 RX ORDER — DIPHENHYDRAMINE HCL 50 MG
50 CAPSULE ORAL ONCE
Qty: 0 | Refills: 0 | Status: COMPLETED | OUTPATIENT
Start: 2018-04-11 | End: 2018-04-12

## 2018-04-11 RX ADMIN — QUETIAPINE FUMARATE 100 MILLIGRAM(S): 200 TABLET, FILM COATED ORAL at 08:35

## 2018-04-11 RX ADMIN — OXCARBAZEPINE 600 MILLIGRAM(S): 300 TABLET, FILM COATED ORAL at 08:34

## 2018-04-11 RX ADMIN — Medication 600 MILLIGRAM(S): at 20:57

## 2018-04-11 RX ADMIN — BENZOCAINE AND MENTHOL 1 LOZENGE: 5; 1 LIQUID ORAL at 17:22

## 2018-04-11 RX ADMIN — Medication 50 MILLIGRAM(S): at 22:28

## 2018-04-11 RX ADMIN — PANTOPRAZOLE SODIUM 40 MILLIGRAM(S): 20 TABLET, DELAYED RELEASE ORAL at 07:54

## 2018-04-11 RX ADMIN — Medication 2 MILLIGRAM(S): at 01:16

## 2018-04-11 RX ADMIN — Medication 600 MILLIGRAM(S): at 08:34

## 2018-04-11 RX ADMIN — OXCARBAZEPINE 600 MILLIGRAM(S): 300 TABLET, FILM COATED ORAL at 20:58

## 2018-04-11 RX ADMIN — BENZOCAINE AND MENTHOL 1 LOZENGE: 5; 1 LIQUID ORAL at 08:55

## 2018-04-11 RX ADMIN — Medication 2 MILLIGRAM(S): at 22:28

## 2018-04-11 NOTE — CHART NOTE - NSCHARTNOTEFT_GEN_A_CORE
Social Work Note:    Patient is seen daily by treatment team.  She remains manic, intrusive at times, and difficult to redirect.  Mariam remains on a 1:1 sitter.      No barriers to discharge identified at this time.  She has supportive family.  Prior to this admission patient was attending [facility].    Discharge plan to be determined in collaboration with patient, family, and treatment team.

## 2018-04-12 RX ORDER — BACITRACIN ZINC 500 UNIT/G
1 OINTMENT IN PACKET (EA) TOPICAL
Qty: 0 | Refills: 0 | Status: DISCONTINUED | OUTPATIENT
Start: 2018-04-12 | End: 2018-05-03

## 2018-04-12 RX ORDER — QUETIAPINE FUMARATE 200 MG/1
500 TABLET, FILM COATED ORAL AT BEDTIME
Qty: 0 | Refills: 0 | Status: DISCONTINUED | OUTPATIENT
Start: 2018-04-12 | End: 2018-04-27

## 2018-04-12 RX ORDER — QUETIAPINE FUMARATE 200 MG/1
200 TABLET, FILM COATED ORAL
Qty: 0 | Refills: 0 | Status: DISCONTINUED | OUTPATIENT
Start: 2018-04-12 | End: 2018-04-13

## 2018-04-12 RX ORDER — LORATADINE 10 MG/1
10 TABLET ORAL DAILY
Qty: 0 | Refills: 0 | Status: DISCONTINUED | OUTPATIENT
Start: 2018-04-12 | End: 2018-04-19

## 2018-04-12 RX ORDER — SODIUM CHLORIDE 0.65 %
1 AEROSOL, SPRAY (ML) NASAL EVERY 4 HOURS
Qty: 0 | Refills: 0 | Status: DISCONTINUED | OUTPATIENT
Start: 2018-04-12 | End: 2018-05-03

## 2018-04-12 RX ADMIN — Medication 600 MILLIGRAM(S): at 08:01

## 2018-04-12 RX ADMIN — QUETIAPINE FUMARATE 500 MILLIGRAM(S): 200 TABLET, FILM COATED ORAL at 20:50

## 2018-04-12 RX ADMIN — Medication 50 MILLIGRAM(S): at 10:06

## 2018-04-12 RX ADMIN — LORATADINE 10 MILLIGRAM(S): 10 TABLET ORAL at 17:59

## 2018-04-12 RX ADMIN — Medication 650 MILLIGRAM(S): at 20:47

## 2018-04-12 RX ADMIN — OXCARBAZEPINE 600 MILLIGRAM(S): 300 TABLET, FILM COATED ORAL at 20:46

## 2018-04-12 RX ADMIN — OXCARBAZEPINE 600 MILLIGRAM(S): 300 TABLET, FILM COATED ORAL at 08:02

## 2018-04-12 RX ADMIN — BENZOCAINE AND MENTHOL 1 LOZENGE: 5; 1 LIQUID ORAL at 14:46

## 2018-04-12 RX ADMIN — Medication 650 MILLIGRAM(S): at 23:01

## 2018-04-12 RX ADMIN — Medication 1 SPRAY(S): at 13:20

## 2018-04-12 RX ADMIN — QUETIAPINE FUMARATE 100 MILLIGRAM(S): 200 TABLET, FILM COATED ORAL at 08:01

## 2018-04-12 RX ADMIN — Medication 600 MILLIGRAM(S): at 20:46

## 2018-04-12 RX ADMIN — Medication 1 APPLICATION(S): at 20:46

## 2018-04-12 RX ADMIN — PANTOPRAZOLE SODIUM 40 MILLIGRAM(S): 20 TABLET, DELAYED RELEASE ORAL at 08:01

## 2018-04-13 RX ORDER — QUETIAPINE FUMARATE 200 MG/1
250 TABLET, FILM COATED ORAL
Qty: 0 | Refills: 0 | Status: DISCONTINUED | OUTPATIENT
Start: 2018-04-13 | End: 2018-04-16

## 2018-04-13 RX ADMIN — QUETIAPINE FUMARATE 200 MILLIGRAM(S): 200 TABLET, FILM COATED ORAL at 08:12

## 2018-04-13 RX ADMIN — OXCARBAZEPINE 600 MILLIGRAM(S): 300 TABLET, FILM COATED ORAL at 20:34

## 2018-04-13 RX ADMIN — Medication 1 APPLICATION(S): at 08:11

## 2018-04-13 RX ADMIN — Medication 600 MILLIGRAM(S): at 08:12

## 2018-04-13 RX ADMIN — QUETIAPINE FUMARATE 500 MILLIGRAM(S): 200 TABLET, FILM COATED ORAL at 20:34

## 2018-04-13 RX ADMIN — QUETIAPINE FUMARATE 100 MILLIGRAM(S): 200 TABLET, FILM COATED ORAL at 08:14

## 2018-04-13 RX ADMIN — PANTOPRAZOLE SODIUM 40 MILLIGRAM(S): 20 TABLET, DELAYED RELEASE ORAL at 08:11

## 2018-04-13 RX ADMIN — OXCARBAZEPINE 600 MILLIGRAM(S): 300 TABLET, FILM COATED ORAL at 08:13

## 2018-04-13 RX ADMIN — Medication 1 APPLICATION(S): at 20:33

## 2018-04-13 RX ADMIN — Medication 600 MILLIGRAM(S): at 20:34

## 2018-04-13 NOTE — CHART NOTE - NSCHARTNOTEFT_GEN_A_CORE
Social work update:    Patient attended treatment team meeting, she continues to present with symptoms of nandini, intrusive at times, with limited insight into illness and need for continued hospitalization.     No barriers to discharge identified at this time.  She has supportive family.  Prior to this admission patient was attending [facility].    Discharge plan to be determined in collaboration with patient, family, and treatment team.

## 2018-04-14 RX ADMIN — Medication 1 APPLICATION(S): at 08:06

## 2018-04-14 RX ADMIN — Medication 600 MILLIGRAM(S): at 20:06

## 2018-04-14 RX ADMIN — OXCARBAZEPINE 600 MILLIGRAM(S): 300 TABLET, FILM COATED ORAL at 22:18

## 2018-04-14 RX ADMIN — QUETIAPINE FUMARATE 100 MILLIGRAM(S): 200 TABLET, FILM COATED ORAL at 08:07

## 2018-04-14 RX ADMIN — QUETIAPINE FUMARATE 500 MILLIGRAM(S): 200 TABLET, FILM COATED ORAL at 22:18

## 2018-04-14 RX ADMIN — Medication 600 MILLIGRAM(S): at 08:05

## 2018-04-14 RX ADMIN — Medication 1 APPLICATION(S): at 20:06

## 2018-04-14 RX ADMIN — OXCARBAZEPINE 600 MILLIGRAM(S): 300 TABLET, FILM COATED ORAL at 08:08

## 2018-04-14 RX ADMIN — QUETIAPINE FUMARATE 250 MILLIGRAM(S): 200 TABLET, FILM COATED ORAL at 08:08

## 2018-04-14 RX ADMIN — PANTOPRAZOLE SODIUM 40 MILLIGRAM(S): 20 TABLET, DELAYED RELEASE ORAL at 07:43

## 2018-04-14 RX ADMIN — LORATADINE 10 MILLIGRAM(S): 10 TABLET ORAL at 08:06

## 2018-04-15 RX ORDER — HALOPERIDOL DECANOATE 100 MG/ML
5 INJECTION INTRAMUSCULAR ONCE
Qty: 0 | Refills: 0 | Status: COMPLETED | OUTPATIENT
Start: 2018-04-15 | End: 2018-04-15

## 2018-04-15 RX ORDER — DIPHENHYDRAMINE HCL 50 MG
50 CAPSULE ORAL ONCE
Qty: 0 | Refills: 0 | Status: COMPLETED | OUTPATIENT
Start: 2018-04-15 | End: 2018-04-15

## 2018-04-15 RX ADMIN — LORATADINE 10 MILLIGRAM(S): 10 TABLET ORAL at 08:39

## 2018-04-15 RX ADMIN — QUETIAPINE FUMARATE 500 MILLIGRAM(S): 200 TABLET, FILM COATED ORAL at 20:38

## 2018-04-15 RX ADMIN — QUETIAPINE FUMARATE 100 MILLIGRAM(S): 200 TABLET, FILM COATED ORAL at 08:38

## 2018-04-15 RX ADMIN — Medication 2 MILLIGRAM(S): at 17:38

## 2018-04-15 RX ADMIN — QUETIAPINE FUMARATE 250 MILLIGRAM(S): 200 TABLET, FILM COATED ORAL at 08:38

## 2018-04-15 RX ADMIN — Medication 1 APPLICATION(S): at 20:37

## 2018-04-15 RX ADMIN — Medication 50 MILLIGRAM(S): at 17:37

## 2018-04-15 RX ADMIN — Medication 1 APPLICATION(S): at 08:39

## 2018-04-15 RX ADMIN — OXCARBAZEPINE 600 MILLIGRAM(S): 300 TABLET, FILM COATED ORAL at 08:37

## 2018-04-15 RX ADMIN — PANTOPRAZOLE SODIUM 40 MILLIGRAM(S): 20 TABLET, DELAYED RELEASE ORAL at 08:39

## 2018-04-15 RX ADMIN — Medication 600 MILLIGRAM(S): at 08:39

## 2018-04-15 RX ADMIN — HALOPERIDOL DECANOATE 5 MILLIGRAM(S): 100 INJECTION INTRAMUSCULAR at 17:38

## 2018-04-16 LAB
ALBUMIN SERPL ELPH-MCNC: 4.2 G/DL — SIGNIFICANT CHANGE UP (ref 3.5–5.2)
ALP SERPL-CCNC: 83 U/L — SIGNIFICANT CHANGE UP (ref 30–115)
ALT FLD-CCNC: 16 U/L — SIGNIFICANT CHANGE UP (ref 0–41)
ANION GAP SERPL CALC-SCNC: 10 MMOL/L — SIGNIFICANT CHANGE UP (ref 7–14)
AST SERPL-CCNC: 23 U/L — SIGNIFICANT CHANGE UP (ref 0–41)
BILIRUB SERPL-MCNC: <0.2 MG/DL — SIGNIFICANT CHANGE UP (ref 0.2–1.2)
BUN SERPL-MCNC: 7 MG/DL — LOW (ref 10–20)
CALCIUM SERPL-MCNC: 9.4 MG/DL — SIGNIFICANT CHANGE UP (ref 8.5–10.1)
CHLORIDE SERPL-SCNC: 101 MMOL/L — SIGNIFICANT CHANGE UP (ref 98–110)
CO2 SERPL-SCNC: 28 MMOL/L — SIGNIFICANT CHANGE UP (ref 17–32)
CREAT SERPL-MCNC: 0.6 MG/DL — LOW (ref 0.7–1.5)
GLUCOSE SERPL-MCNC: 98 MG/DL — SIGNIFICANT CHANGE UP (ref 70–99)
POTASSIUM SERPL-MCNC: 4.4 MMOL/L — SIGNIFICANT CHANGE UP (ref 3.5–5)
POTASSIUM SERPL-SCNC: 4.4 MMOL/L — SIGNIFICANT CHANGE UP (ref 3.5–5)
PROT SERPL-MCNC: 7.3 G/DL — SIGNIFICANT CHANGE UP (ref 6–8)
SODIUM SERPL-SCNC: 139 MMOL/L — SIGNIFICANT CHANGE UP (ref 135–146)

## 2018-04-16 RX ORDER — QUETIAPINE FUMARATE 200 MG/1
300 TABLET, FILM COATED ORAL
Qty: 0 | Refills: 0 | Status: DISCONTINUED | OUTPATIENT
Start: 2018-04-16 | End: 2018-04-23

## 2018-04-16 RX ADMIN — Medication 600 MILLIGRAM(S): at 09:12

## 2018-04-16 RX ADMIN — LORATADINE 10 MILLIGRAM(S): 10 TABLET ORAL at 09:12

## 2018-04-16 RX ADMIN — Medication 1 APPLICATION(S): at 20:11

## 2018-04-16 RX ADMIN — QUETIAPINE FUMARATE 100 MILLIGRAM(S): 200 TABLET, FILM COATED ORAL at 09:20

## 2018-04-16 RX ADMIN — QUETIAPINE FUMARATE 500 MILLIGRAM(S): 200 TABLET, FILM COATED ORAL at 20:09

## 2018-04-16 RX ADMIN — PANTOPRAZOLE SODIUM 40 MILLIGRAM(S): 20 TABLET, DELAYED RELEASE ORAL at 09:20

## 2018-04-16 RX ADMIN — OXCARBAZEPINE 600 MILLIGRAM(S): 300 TABLET, FILM COATED ORAL at 09:19

## 2018-04-16 RX ADMIN — QUETIAPINE FUMARATE 250 MILLIGRAM(S): 200 TABLET, FILM COATED ORAL at 09:20

## 2018-04-16 RX ADMIN — Medication 1 APPLICATION(S): at 09:12

## 2018-04-16 RX ADMIN — OXCARBAZEPINE 600 MILLIGRAM(S): 300 TABLET, FILM COATED ORAL at 20:09

## 2018-04-16 RX ADMIN — Medication 600 MILLIGRAM(S): at 20:10

## 2018-04-16 NOTE — CHART NOTE - NSCHARTNOTEFT_GEN_A_CORE
Social Work Note:    Patient was seen earlier this morning by treatment team.  She presented as alert and oriented and able to engage.  Mood was irritable, affect angry.  Today patient will be going to court for hearing to determine ongoing inpatient psychiatric treatment.  Patient will be going to court.      will continue to meet with patient both 1:1 and with treatment team.  Discharge plan is for referral to resume treatment at [facility].  At this time patient is not psychiatrically stable for discharge.

## 2018-04-17 RX ADMIN — LORATADINE 10 MILLIGRAM(S): 10 TABLET ORAL at 08:01

## 2018-04-17 RX ADMIN — PANTOPRAZOLE SODIUM 40 MILLIGRAM(S): 20 TABLET, DELAYED RELEASE ORAL at 07:32

## 2018-04-17 RX ADMIN — Medication 600 MILLIGRAM(S): at 20:46

## 2018-04-17 RX ADMIN — OXCARBAZEPINE 600 MILLIGRAM(S): 300 TABLET, FILM COATED ORAL at 20:46

## 2018-04-17 RX ADMIN — Medication 1 APPLICATION(S): at 08:01

## 2018-04-17 RX ADMIN — Medication 1 APPLICATION(S): at 20:46

## 2018-04-17 RX ADMIN — Medication 1 SPRAY(S): at 05:10

## 2018-04-17 RX ADMIN — QUETIAPINE FUMARATE 100 MILLIGRAM(S): 200 TABLET, FILM COATED ORAL at 08:01

## 2018-04-17 RX ADMIN — OXCARBAZEPINE 600 MILLIGRAM(S): 300 TABLET, FILM COATED ORAL at 08:01

## 2018-04-17 RX ADMIN — QUETIAPINE FUMARATE 500 MILLIGRAM(S): 200 TABLET, FILM COATED ORAL at 20:46

## 2018-04-17 RX ADMIN — Medication 600 MILLIGRAM(S): at 08:01

## 2018-04-18 RX ADMIN — LORATADINE 10 MILLIGRAM(S): 10 TABLET ORAL at 08:20

## 2018-04-18 RX ADMIN — Medication 600 MILLIGRAM(S): at 08:20

## 2018-04-18 RX ADMIN — Medication 600 MILLIGRAM(S): at 21:32

## 2018-04-18 RX ADMIN — OXCARBAZEPINE 600 MILLIGRAM(S): 300 TABLET, FILM COATED ORAL at 21:32

## 2018-04-18 RX ADMIN — PANTOPRAZOLE SODIUM 40 MILLIGRAM(S): 20 TABLET, DELAYED RELEASE ORAL at 08:20

## 2018-04-18 RX ADMIN — QUETIAPINE FUMARATE 100 MILLIGRAM(S): 200 TABLET, FILM COATED ORAL at 08:19

## 2018-04-18 RX ADMIN — OXCARBAZEPINE 600 MILLIGRAM(S): 300 TABLET, FILM COATED ORAL at 08:20

## 2018-04-18 RX ADMIN — QUETIAPINE FUMARATE 500 MILLIGRAM(S): 200 TABLET, FILM COATED ORAL at 21:32

## 2018-04-18 RX ADMIN — QUETIAPINE FUMARATE 300 MILLIGRAM(S): 200 TABLET, FILM COATED ORAL at 08:20

## 2018-04-18 RX ADMIN — Medication 1 APPLICATION(S): at 21:32

## 2018-04-18 NOTE — CHART NOTE - NSCHARTNOTEFT_GEN_A_CORE
Social Work Note:    Patient is observed to have nandini symptoms.  During the day she is observed to be on the unit - in the day room and she is selective with groups.  At times she is intrusive and needs redirection.     No barriers to discharge identified at this time.  Patient's father to visit with patient today and will meet with this worker during visiting hours.  Discharge plan is for patient to resume treatment at [facility].       At this time patient is not psychiatrically stable for discharge.

## 2018-04-19 RX ORDER — CARBAMAZEPINE 200 MG
100 TABLET ORAL
Qty: 0 | Refills: 0 | Status: DISCONTINUED | OUTPATIENT
Start: 2018-04-19 | End: 2018-04-23

## 2018-04-19 RX ADMIN — Medication 1 APPLICATION(S): at 08:09

## 2018-04-19 RX ADMIN — OXCARBAZEPINE 600 MILLIGRAM(S): 300 TABLET, FILM COATED ORAL at 20:57

## 2018-04-19 RX ADMIN — QUETIAPINE FUMARATE 500 MILLIGRAM(S): 200 TABLET, FILM COATED ORAL at 20:58

## 2018-04-19 RX ADMIN — Medication 600 MILLIGRAM(S): at 08:08

## 2018-04-19 RX ADMIN — QUETIAPINE FUMARATE 100 MILLIGRAM(S): 200 TABLET, FILM COATED ORAL at 08:07

## 2018-04-19 RX ADMIN — PANTOPRAZOLE SODIUM 40 MILLIGRAM(S): 20 TABLET, DELAYED RELEASE ORAL at 07:37

## 2018-04-19 RX ADMIN — LORATADINE 10 MILLIGRAM(S): 10 TABLET ORAL at 08:07

## 2018-04-19 RX ADMIN — Medication 100 MILLIGRAM(S): at 20:57

## 2018-04-19 RX ADMIN — OXCARBAZEPINE 600 MILLIGRAM(S): 300 TABLET, FILM COATED ORAL at 08:09

## 2018-04-19 RX ADMIN — Medication 1 APPLICATION(S): at 20:59

## 2018-04-19 RX ADMIN — QUETIAPINE FUMARATE 300 MILLIGRAM(S): 200 TABLET, FILM COATED ORAL at 07:36

## 2018-04-20 RX ADMIN — OXCARBAZEPINE 600 MILLIGRAM(S): 300 TABLET, FILM COATED ORAL at 08:22

## 2018-04-20 RX ADMIN — QUETIAPINE FUMARATE 100 MILLIGRAM(S): 200 TABLET, FILM COATED ORAL at 08:20

## 2018-04-20 RX ADMIN — PANTOPRAZOLE SODIUM 40 MILLIGRAM(S): 20 TABLET, DELAYED RELEASE ORAL at 07:41

## 2018-04-20 RX ADMIN — Medication 100 MILLIGRAM(S): at 20:13

## 2018-04-20 RX ADMIN — QUETIAPINE FUMARATE 500 MILLIGRAM(S): 200 TABLET, FILM COATED ORAL at 20:14

## 2018-04-20 RX ADMIN — OXCARBAZEPINE 600 MILLIGRAM(S): 300 TABLET, FILM COATED ORAL at 20:13

## 2018-04-20 RX ADMIN — QUETIAPINE FUMARATE 300 MILLIGRAM(S): 200 TABLET, FILM COATED ORAL at 08:21

## 2018-04-20 RX ADMIN — Medication 100 MILLIGRAM(S): at 08:22

## 2018-04-20 RX ADMIN — Medication 1 APPLICATION(S): at 08:21

## 2018-04-20 NOTE — CHART NOTE - NSCHARTNOTEFT_GEN_A_CORE
Social Work Note:    Patient is seen daily by treatment team.  She continues to present as manic and intrusive.  At times she is difficult to redirect.    During visiting hours patient has been visited by both her parents.  This worker spoke with patient's father.      No barriers to discharge identified at this time.  [facility]  contacted to discuss case.  Patient may return to   this program.  At this time patient is not psychiatrically stable for discharge.

## 2018-04-21 RX ADMIN — Medication 100 MILLIGRAM(S): at 21:39

## 2018-04-21 RX ADMIN — Medication 1 APPLICATION(S): at 08:10

## 2018-04-21 RX ADMIN — QUETIAPINE FUMARATE 500 MILLIGRAM(S): 200 TABLET, FILM COATED ORAL at 21:40

## 2018-04-21 RX ADMIN — OXCARBAZEPINE 600 MILLIGRAM(S): 300 TABLET, FILM COATED ORAL at 08:12

## 2018-04-21 RX ADMIN — PANTOPRAZOLE SODIUM 40 MILLIGRAM(S): 20 TABLET, DELAYED RELEASE ORAL at 07:48

## 2018-04-21 RX ADMIN — QUETIAPINE FUMARATE 300 MILLIGRAM(S): 200 TABLET, FILM COATED ORAL at 07:48

## 2018-04-21 RX ADMIN — Medication 100 MILLIGRAM(S): at 08:10

## 2018-04-21 RX ADMIN — OXCARBAZEPINE 600 MILLIGRAM(S): 300 TABLET, FILM COATED ORAL at 21:40

## 2018-04-22 RX ADMIN — OXCARBAZEPINE 600 MILLIGRAM(S): 300 TABLET, FILM COATED ORAL at 08:22

## 2018-04-22 RX ADMIN — PANTOPRAZOLE SODIUM 40 MILLIGRAM(S): 20 TABLET, DELAYED RELEASE ORAL at 07:43

## 2018-04-22 RX ADMIN — OXCARBAZEPINE 600 MILLIGRAM(S): 300 TABLET, FILM COATED ORAL at 21:37

## 2018-04-22 RX ADMIN — Medication 1 APPLICATION(S): at 21:38

## 2018-04-22 RX ADMIN — Medication 100 MILLIGRAM(S): at 21:38

## 2018-04-22 RX ADMIN — QUETIAPINE FUMARATE 500 MILLIGRAM(S): 200 TABLET, FILM COATED ORAL at 21:35

## 2018-04-22 RX ADMIN — Medication 100 MILLIGRAM(S): at 08:22

## 2018-04-22 RX ADMIN — Medication 1 APPLICATION(S): at 08:21

## 2018-04-22 RX ADMIN — QUETIAPINE FUMARATE 300 MILLIGRAM(S): 200 TABLET, FILM COATED ORAL at 07:43

## 2018-04-23 RX ORDER — CARBAMAZEPINE 200 MG
100 TABLET ORAL
Qty: 0 | Refills: 0 | Status: DISCONTINUED | OUTPATIENT
Start: 2018-04-23 | End: 2018-04-23

## 2018-04-23 RX ORDER — CARBAMAZEPINE 200 MG
100 TABLET ORAL
Qty: 0 | Refills: 0 | Status: DISCONTINUED | OUTPATIENT
Start: 2018-04-23 | End: 2018-04-25

## 2018-04-23 RX ORDER — CARBAMAZEPINE 200 MG
200 TABLET ORAL AT BEDTIME
Qty: 0 | Refills: 0 | Status: DISCONTINUED | OUTPATIENT
Start: 2018-04-23 | End: 2018-04-23

## 2018-04-23 RX ORDER — QUETIAPINE FUMARATE 200 MG/1
200 TABLET, FILM COATED ORAL
Qty: 0 | Refills: 0 | Status: DISCONTINUED | OUTPATIENT
Start: 2018-04-23 | End: 2018-04-27

## 2018-04-23 RX ORDER — CARBAMAZEPINE 200 MG
200 TABLET ORAL AT BEDTIME
Qty: 0 | Refills: 0 | Status: DISCONTINUED | OUTPATIENT
Start: 2018-04-23 | End: 2018-04-27

## 2018-04-23 RX ADMIN — QUETIAPINE FUMARATE 500 MILLIGRAM(S): 200 TABLET, FILM COATED ORAL at 21:46

## 2018-04-23 RX ADMIN — Medication 1 APPLICATION(S): at 21:46

## 2018-04-23 RX ADMIN — OXCARBAZEPINE 600 MILLIGRAM(S): 300 TABLET, FILM COATED ORAL at 08:13

## 2018-04-23 RX ADMIN — QUETIAPINE FUMARATE 300 MILLIGRAM(S): 200 TABLET, FILM COATED ORAL at 08:12

## 2018-04-23 RX ADMIN — Medication 100 MILLIGRAM(S): at 08:12

## 2018-04-23 RX ADMIN — Medication 200 MILLIGRAM(S): at 21:46

## 2018-04-23 RX ADMIN — PANTOPRAZOLE SODIUM 40 MILLIGRAM(S): 20 TABLET, DELAYED RELEASE ORAL at 08:12

## 2018-04-23 RX ADMIN — OXCARBAZEPINE 600 MILLIGRAM(S): 300 TABLET, FILM COATED ORAL at 21:46

## 2018-04-23 NOTE — PROGRESS NOTE BEHAVIORAL HEALTH - AXIS III
congestion
none
congestion
none
none
congestion

## 2018-04-23 NOTE — CHART NOTE - NSCHARTNOTEFT_GEN_A_CORE
Social Work Note:    Treatment team met with patient earlier on unit rounds to discuss treatment plan, medications, and discharge plan.  Patient was alert, oriented, and able to engage with this worker and other members of the treatment team.    Mood is improving from previous days.  She is less angry, irritable.      No barriers to discharge identified at this time.  Plan is for patient to resume outpatient treatment at [facility].  Patient is aware and agreeable to same.      At this time patient is not psychiatrically stable for discharge.

## 2018-04-23 NOTE — PROGRESS NOTE BEHAVIORAL HEALTH - DETAILS
LICHA castillox   improved
LICHA castillox   improved
LICHA sx  resolved
LICHA sx  resolved
URI sx
URI sx
LICHA sx  resolved
URI sx
decreased URI sx
LICHA sx  resolved
LICHA sx  resolved
LICHA castillox   improved

## 2018-04-24 RX ADMIN — Medication 1 APPLICATION(S): at 21:02

## 2018-04-24 RX ADMIN — QUETIAPINE FUMARATE 200 MILLIGRAM(S): 200 TABLET, FILM COATED ORAL at 08:16

## 2018-04-24 RX ADMIN — Medication 200 MILLIGRAM(S): at 21:02

## 2018-04-24 RX ADMIN — QUETIAPINE FUMARATE 500 MILLIGRAM(S): 200 TABLET, FILM COATED ORAL at 21:02

## 2018-04-24 RX ADMIN — OXCARBAZEPINE 600 MILLIGRAM(S): 300 TABLET, FILM COATED ORAL at 21:02

## 2018-04-24 RX ADMIN — Medication 100 MILLIGRAM(S): at 08:17

## 2018-04-24 RX ADMIN — PANTOPRAZOLE SODIUM 40 MILLIGRAM(S): 20 TABLET, DELAYED RELEASE ORAL at 08:17

## 2018-04-24 RX ADMIN — OXCARBAZEPINE 600 MILLIGRAM(S): 300 TABLET, FILM COATED ORAL at 08:17

## 2018-04-25 RX ORDER — CARBAMAZEPINE 200 MG
200 TABLET ORAL
Qty: 0 | Refills: 0 | Status: DISCONTINUED | OUTPATIENT
Start: 2018-04-25 | End: 2018-04-27

## 2018-04-25 RX ADMIN — Medication 100 MILLIGRAM(S): at 08:19

## 2018-04-25 RX ADMIN — Medication 1 APPLICATION(S): at 21:06

## 2018-04-25 RX ADMIN — Medication 200 MILLIGRAM(S): at 21:05

## 2018-04-25 RX ADMIN — OXCARBAZEPINE 600 MILLIGRAM(S): 300 TABLET, FILM COATED ORAL at 08:17

## 2018-04-25 RX ADMIN — PANTOPRAZOLE SODIUM 40 MILLIGRAM(S): 20 TABLET, DELAYED RELEASE ORAL at 08:15

## 2018-04-25 RX ADMIN — QUETIAPINE FUMARATE 500 MILLIGRAM(S): 200 TABLET, FILM COATED ORAL at 21:05

## 2018-04-25 RX ADMIN — Medication 1 APPLICATION(S): at 08:16

## 2018-04-25 RX ADMIN — OXCARBAZEPINE 600 MILLIGRAM(S): 300 TABLET, FILM COATED ORAL at 21:05

## 2018-04-25 RX ADMIN — QUETIAPINE FUMARATE 200 MILLIGRAM(S): 200 TABLET, FILM COATED ORAL at 08:16

## 2018-04-25 NOTE — CHART NOTE - NSCHARTNOTEFT_GEN_A_CORE
Social Work Note:    Patient's manic symptoms are improving.  She is less intrusive and able to be redirected less often.  Treatment team meets with patient daily to discuss treatment plan, medications, and discharge plan.  Family visits regularly with patient.   This worker met with patient's father yesterday.  Mariam remains on a 1:1 observation at this time.      No barriers to discharge identified at this time.  Plan is for patient to resume treatment at [facility].      At this time patient is not psychiatrically stable for discharge.

## 2018-04-26 RX ADMIN — OXCARBAZEPINE 600 MILLIGRAM(S): 300 TABLET, FILM COATED ORAL at 20:35

## 2018-04-26 RX ADMIN — QUETIAPINE FUMARATE 200 MILLIGRAM(S): 200 TABLET, FILM COATED ORAL at 08:15

## 2018-04-26 RX ADMIN — Medication 200 MILLIGRAM(S): at 08:14

## 2018-04-26 RX ADMIN — Medication 1 APPLICATION(S): at 20:35

## 2018-04-26 RX ADMIN — PANTOPRAZOLE SODIUM 40 MILLIGRAM(S): 20 TABLET, DELAYED RELEASE ORAL at 08:13

## 2018-04-26 RX ADMIN — Medication 200 MILLIGRAM(S): at 20:35

## 2018-04-26 RX ADMIN — QUETIAPINE FUMARATE 500 MILLIGRAM(S): 200 TABLET, FILM COATED ORAL at 20:35

## 2018-04-26 RX ADMIN — OXCARBAZEPINE 600 MILLIGRAM(S): 300 TABLET, FILM COATED ORAL at 08:15

## 2018-04-26 RX ADMIN — Medication 1 APPLICATION(S): at 08:16

## 2018-04-27 RX ORDER — CARBAMAZEPINE 200 MG
200 TABLET ORAL
Qty: 0 | Refills: 0 | Status: DISCONTINUED | OUTPATIENT
Start: 2018-04-27 | End: 2018-05-03

## 2018-04-27 RX ORDER — QUETIAPINE FUMARATE 200 MG/1
100 TABLET, FILM COATED ORAL
Qty: 0 | Refills: 0 | Status: DISCONTINUED | OUTPATIENT
Start: 2018-04-27 | End: 2018-05-01

## 2018-04-27 RX ORDER — QUETIAPINE FUMARATE 200 MG/1
450 TABLET, FILM COATED ORAL AT BEDTIME
Qty: 0 | Refills: 0 | Status: DISCONTINUED | OUTPATIENT
Start: 2018-04-27 | End: 2018-04-30

## 2018-04-27 RX ORDER — CARBAMAZEPINE 200 MG
300 TABLET ORAL AT BEDTIME
Qty: 0 | Refills: 0 | Status: DISCONTINUED | OUTPATIENT
Start: 2018-04-27 | End: 2018-05-03

## 2018-04-27 RX ADMIN — Medication 200 MILLIGRAM(S): at 08:34

## 2018-04-27 RX ADMIN — OXCARBAZEPINE 600 MILLIGRAM(S): 300 TABLET, FILM COATED ORAL at 08:35

## 2018-04-27 RX ADMIN — QUETIAPINE FUMARATE 450 MILLIGRAM(S): 200 TABLET, FILM COATED ORAL at 21:13

## 2018-04-27 RX ADMIN — Medication 1 APPLICATION(S): at 21:12

## 2018-04-27 RX ADMIN — Medication 1 APPLICATION(S): at 08:36

## 2018-04-27 RX ADMIN — PANTOPRAZOLE SODIUM 40 MILLIGRAM(S): 20 TABLET, DELAYED RELEASE ORAL at 08:34

## 2018-04-27 RX ADMIN — Medication 300 MILLIGRAM(S): at 21:03

## 2018-04-27 RX ADMIN — OXCARBAZEPINE 600 MILLIGRAM(S): 300 TABLET, FILM COATED ORAL at 21:09

## 2018-04-27 RX ADMIN — QUETIAPINE FUMARATE 200 MILLIGRAM(S): 200 TABLET, FILM COATED ORAL at 08:35

## 2018-04-27 NOTE — CHART NOTE - NSCHARTNOTEFT_GEN_A_CORE
Social Work Note:    Patient now able to engage more appropriately with staff.  Treatment team meets with patient daily to discuss treatment plan, medications, and discharge plan.  Communication with patient's family ongoing.      No barriers to discharge identified at this time.  Patient has an available home in the community to return to.  Financial Screening contacted to discuss Medicaid application process with patient.  This has occurred already and patient informed of necessary documentation required for application.      At this time patient is not psychiatrically stable for discharge.

## 2018-04-28 RX ORDER — TRAZODONE HCL 50 MG
100 TABLET ORAL AT BEDTIME
Qty: 0 | Refills: 0 | Status: DISCONTINUED | OUTPATIENT
Start: 2018-04-28 | End: 2018-05-03

## 2018-04-28 RX ADMIN — Medication 1 APPLICATION(S): at 21:02

## 2018-04-28 RX ADMIN — Medication 300 MILLIGRAM(S): at 20:58

## 2018-04-28 RX ADMIN — QUETIAPINE FUMARATE 450 MILLIGRAM(S): 200 TABLET, FILM COATED ORAL at 20:58

## 2018-04-28 RX ADMIN — Medication 2 MILLIGRAM(S): at 00:44

## 2018-04-28 RX ADMIN — OXCARBAZEPINE 600 MILLIGRAM(S): 300 TABLET, FILM COATED ORAL at 09:14

## 2018-04-28 RX ADMIN — PANTOPRAZOLE SODIUM 40 MILLIGRAM(S): 20 TABLET, DELAYED RELEASE ORAL at 09:13

## 2018-04-28 RX ADMIN — Medication 200 MILLIGRAM(S): at 09:12

## 2018-04-28 RX ADMIN — QUETIAPINE FUMARATE 100 MILLIGRAM(S): 200 TABLET, FILM COATED ORAL at 09:14

## 2018-04-28 RX ADMIN — OXCARBAZEPINE 600 MILLIGRAM(S): 300 TABLET, FILM COATED ORAL at 20:58

## 2018-04-29 RX ADMIN — QUETIAPINE FUMARATE 450 MILLIGRAM(S): 200 TABLET, FILM COATED ORAL at 20:34

## 2018-04-29 RX ADMIN — QUETIAPINE FUMARATE 100 MILLIGRAM(S): 200 TABLET, FILM COATED ORAL at 09:13

## 2018-04-29 RX ADMIN — OXCARBAZEPINE 600 MILLIGRAM(S): 300 TABLET, FILM COATED ORAL at 20:34

## 2018-04-29 RX ADMIN — Medication 2 MILLIGRAM(S): at 11:20

## 2018-04-29 RX ADMIN — OXCARBAZEPINE 600 MILLIGRAM(S): 300 TABLET, FILM COATED ORAL at 09:14

## 2018-04-29 RX ADMIN — Medication 200 MILLIGRAM(S): at 09:13

## 2018-04-29 RX ADMIN — PANTOPRAZOLE SODIUM 40 MILLIGRAM(S): 20 TABLET, DELAYED RELEASE ORAL at 09:13

## 2018-04-29 RX ADMIN — Medication 300 MILLIGRAM(S): at 20:34

## 2018-04-30 LAB
CARBAMAZEPINE SERPL-MCNC: 7.6 UG/ML — SIGNIFICANT CHANGE UP (ref 4–12)
CARBAMAZEPINE SERPL-MCNC: 8.5 UG/ML — SIGNIFICANT CHANGE UP (ref 4–12)
CHOLEST SERPL-MCNC: 174 MG/DL — SIGNIFICANT CHANGE UP (ref 100–200)
ESTIMATED AVERAGE GLUCOSE: 108 MG/DL — SIGNIFICANT CHANGE UP (ref 68–114)
HBA1C BLD-MCNC: 5.4 % — SIGNIFICANT CHANGE UP (ref 4–5.6)
HDLC SERPL-MCNC: 50 MG/DL — SIGNIFICANT CHANGE UP (ref 40–125)
LIPID PNL WITH DIRECT LDL SERPL: 105 MG/DL — SIGNIFICANT CHANGE UP (ref 4–129)
TOTAL CHOLESTEROL/HDL RATIO MEASUREMENT: 3.5 RATIO — LOW (ref 4–5.5)
TRIGL SERPL-MCNC: 200 MG/DL — HIGH (ref 10–149)

## 2018-04-30 RX ORDER — QUETIAPINE FUMARATE 200 MG/1
400 TABLET, FILM COATED ORAL AT BEDTIME
Qty: 0 | Refills: 0 | Status: DISCONTINUED | OUTPATIENT
Start: 2018-04-30 | End: 2018-05-03

## 2018-04-30 RX ADMIN — PANTOPRAZOLE SODIUM 40 MILLIGRAM(S): 20 TABLET, DELAYED RELEASE ORAL at 08:18

## 2018-04-30 RX ADMIN — Medication 300 MILLIGRAM(S): at 20:34

## 2018-04-30 RX ADMIN — OXCARBAZEPINE 600 MILLIGRAM(S): 300 TABLET, FILM COATED ORAL at 20:33

## 2018-04-30 RX ADMIN — Medication 200 MILLIGRAM(S): at 08:18

## 2018-04-30 RX ADMIN — QUETIAPINE FUMARATE 100 MILLIGRAM(S): 200 TABLET, FILM COATED ORAL at 08:18

## 2018-04-30 RX ADMIN — Medication 1 APPLICATION(S): at 20:34

## 2018-04-30 RX ADMIN — OXCARBAZEPINE 600 MILLIGRAM(S): 300 TABLET, FILM COATED ORAL at 08:19

## 2018-04-30 RX ADMIN — QUETIAPINE FUMARATE 400 MILLIGRAM(S): 200 TABLET, FILM COATED ORAL at 20:33

## 2018-04-30 NOTE — PROGRESS NOTE BEHAVIORAL HEALTH - PROBLEM SELECTOR PLAN 1
- Seroquel decreased to Seroquel 400mg oral qhs at night, continue Seroquel 100mg oral q24h  - carbamazepine level ordered for tomorrow morning  - lipid profile and HbA1c ordered for tomorrow morning
restart trileptal and seroquel  miliue therapy
C/w current treatment  psychotherapy and psychoeducation

## 2018-04-30 NOTE — PROGRESS NOTE BEHAVIORAL HEALTH - PROBLEM SELECTOR PROBLEM 1
Bipolar affective disorder, current episode manic, current episode severity unspecified
Bipolar affective disorder, current episode manic, current episode severity unspecified
Bipolar 1 disorder, mixed

## 2018-04-30 NOTE — PROGRESS NOTE BEHAVIORAL HEALTH - CASE SUMMARY
bipolar manic/psychosis.  Pt cannot take Li or depakote
bipolar , manic, with hx of non compliance
bipolar disorder; no psychosis and with resolving nandini

## 2018-04-30 NOTE — PROGRESS NOTE BEHAVIORAL HEALTH - NSBHCHARTREVIEWVS_PSY_A_CORE FT
Vital Signs Last 24 Hrs  T(C): 36.2 (14 Apr 2018 17:46), Max: 36.2 (14 Apr 2018 17:46)  T(F): 97.2 (14 Apr 2018 17:46), Max: 97.2 (14 Apr 2018 17:46)  HR: 106 (14 Apr 2018 17:46) (106 - 128)  BP: 124/88 (14 Apr 2018 17:46) (115/69 - 124/88)  BP(mean): --  RR: 18 (14 Apr 2018 17:46) (18 - 18)  SpO2: --
Pt refused am vitals.
Temperature  Temp (F): 89.7 Degrees F  Temp (C) Temp (C): 32.1 Degrees C    Heart Rate  Heart Rate Heart Rate (beats/min): 107 /min    Noninvasive Blood Pressure  BP Systolic Systolic: 136 mm Hg  BP Diastolic Diastolic (mm Hg): 81 mm Hg
Vital Signs Last 24 Hrs  T(C): 36.2 (29 Apr 2018 06:28), Max: 36.9 (28 Apr 2018 13:46)  T(F): 97.1 (29 Apr 2018 06:28), Max: 98.4 (28 Apr 2018 13:46)  HR: 103 (29 Apr 2018 06:28) (58 - 103)  BP: 122/84 (29 Apr 2018 06:28) (122/84 - 130/58)  BP(mean): --  RR: 20 (29 Apr 2018 06:28) (16 - 20)  SpO2: --
Vital Signs Last 24 Hrs  T(C): 36.4 (21 Apr 2018 11:08), Max: 36.4 (21 Apr 2018 11:08)  T(F): 97.6 (21 Apr 2018 11:08), Max: 97.6 (21 Apr 2018 11:08)  HR: 101 (21 Apr 2018 11:08) (90 - 101)  BP: 128/86 (21 Apr 2018 11:08) (128/86 - 143/96)  BP(mean): --  RR: 16 (21 Apr 2018 11:08) (16 - 20)  SpO2: --

## 2018-04-30 NOTE — PROGRESS NOTE BEHAVIORAL HEALTH - SUMMARY
Bipolar disorder.
33 year old female presented acutely manic. Patient is no longer manic, continues to be mildly irritable. Patient is much more appropriate on the unit, is able to remove herself from situations prior to escalation. At this time, patient is considered to have significant improvement and patient denies desire to harm self or others. If patient continues to progress, possible discharge this week.
32yo female with pph of bipolar disorder, presenting with current manic episode - in the setting of medication non-adherence and poor sleep. Pt continues to exhibit signs of mixed nandini - irritability, pressured speech, distractibility. Pt does appear compliant with treatment while on the unit and is allowing visits by family.

## 2018-04-30 NOTE — PROGRESS NOTE BEHAVIORAL HEALTH - RISK ASSESSMENT
Patient has improved insight and judgment since last week, however, patient is still irritable at times and struggling with socializing with others at times. Patient currently denies suicidal and homicidal ideation and is not considered to be an imminent risk to harm self, however, patient has high chronic risk given diagnosis and recent episode of nandini.
Low.
Pt has no history of suicide attempt and denies suicidal ideation, plan or intent.

## 2018-04-30 NOTE — PROGRESS NOTE BEHAVIORAL HEALTH - MODIFICATIONS
continue meds for now; ?degree of non compliance. Will increase seroquel
-
improvement noted; more easily redirected

## 2018-04-30 NOTE — CHART NOTE - NSCHARTNOTEFT_GEN_A_CORE
Social Work Note:    Treatment team met with patient earlier today.  Patient is less manic, more calm and cooperative during interview.  She remains on a 1:1 at this time.      No barriers to discharge identified at this time.  Referral made to [facility] to resume treatment there.  Mariam is aware and agreeable to same.      At this time patient is not psychiatrically stable for discharge.

## 2018-05-01 RX ADMIN — OXCARBAZEPINE 600 MILLIGRAM(S): 300 TABLET, FILM COATED ORAL at 08:42

## 2018-05-01 RX ADMIN — Medication 1 APPLICATION(S): at 08:43

## 2018-05-01 RX ADMIN — QUETIAPINE FUMARATE 400 MILLIGRAM(S): 200 TABLET, FILM COATED ORAL at 21:02

## 2018-05-01 RX ADMIN — OXCARBAZEPINE 600 MILLIGRAM(S): 300 TABLET, FILM COATED ORAL at 21:04

## 2018-05-01 RX ADMIN — Medication 100 MILLIGRAM(S): at 22:10

## 2018-05-01 RX ADMIN — QUETIAPINE FUMARATE 100 MILLIGRAM(S): 200 TABLET, FILM COATED ORAL at 08:42

## 2018-05-01 RX ADMIN — Medication 1 APPLICATION(S): at 21:01

## 2018-05-01 RX ADMIN — PANTOPRAZOLE SODIUM 40 MILLIGRAM(S): 20 TABLET, DELAYED RELEASE ORAL at 08:42

## 2018-05-01 RX ADMIN — Medication 300 MILLIGRAM(S): at 21:02

## 2018-05-01 RX ADMIN — Medication 200 MILLIGRAM(S): at 08:42
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
## 2018-05-02 RX ADMIN — Medication 200 MILLIGRAM(S): at 08:17

## 2018-05-02 RX ADMIN — Medication 1 APPLICATION(S): at 20:45

## 2018-05-02 RX ADMIN — QUETIAPINE FUMARATE 400 MILLIGRAM(S): 200 TABLET, FILM COATED ORAL at 20:44

## 2018-05-02 RX ADMIN — OXCARBAZEPINE 600 MILLIGRAM(S): 300 TABLET, FILM COATED ORAL at 08:17

## 2018-05-02 RX ADMIN — Medication 300 MILLIGRAM(S): at 20:44

## 2018-05-02 RX ADMIN — OXCARBAZEPINE 600 MILLIGRAM(S): 300 TABLET, FILM COATED ORAL at 20:44

## 2018-05-02 RX ADMIN — Medication 1 APPLICATION(S): at 08:18

## 2018-05-02 RX ADMIN — PANTOPRAZOLE SODIUM 40 MILLIGRAM(S): 20 TABLET, DELAYED RELEASE ORAL at 07:38

## 2018-05-02 NOTE — PROGRESS NOTE BEHAVIORAL HEALTH - NSBHADMITMEDEDU_PSY_A_CORE
yes...
no
yes...
no
yes...
no
yes...
yes...

## 2018-05-02 NOTE — PROGRESS NOTE BEHAVIORAL HEALTH - AFFECT QUALITY
Irritable
Anxious/Irritable
Irritable
Euthymic
Irritable

## 2018-05-02 NOTE — PROGRESS NOTE BEHAVIORAL HEALTH - BODY HABITUS
Well nourished

## 2018-05-02 NOTE — PROGRESS NOTE BEHAVIORAL HEALTH - NSBHATTESTSEENBY_PSY_A_CORE
attending Psychiatrist without NP/Trainee
Attending Psychiatrist supervising NP/Trainee, meeting pt...
attending Psychiatrist without NP/Trainee
Attending Psychiatrist supervising NP/Trainee, meeting pt...
attending Psychiatrist without NP/Trainee
Attending Psychiatrist supervising NP/Trainee, meeting pt...

## 2018-05-02 NOTE — PROGRESS NOTE BEHAVIORAL HEALTH - NSBHADMITIPOBSFT_PSY_A_CORE
-
appropriate at this time
need for redirection
appropriate at this time
need for redirection
appropriate at this time
appropriate at this time
due to behaviors listed above
no indication of need for 1:1
based on history
stable.
based on prior behaviors
prior behaviors
no danger to self/others
no imminent danger to self/others
no indication of need for 1:1
no s/h ideation
based on past history; not current status
no need for 1:1
will d/c 1:1 and observe
no indication of need for 1:1
danger to self/others
pt's prior behaviors
intrusive behavior
intrusive behavior

## 2018-05-02 NOTE — CHART NOTE - NSCHARTNOTEFT_GEN_A_CORE
Social Work Note:    Patient was seen by treatment team this morning.  Mood is calm, she was able to engage appropriately with treatment team members.       met with patient and her family yesterday.  Plan is for patient to resume treatment at [facility].  She is aware   and agreeable to same.     Mariam is anticipated for discharge tomorrow - Thursday May 3rd.

## 2018-05-02 NOTE — PROGRESS NOTE BEHAVIORAL HEALTH - NSBHADMITIPOBS_PSY_A_CORE
Constant observation
Routine observation
Constant observation
Enhanced supervision
Constant observation
Constant observation
Routine observation
Constant observation
Routine observation
Constant observation

## 2018-05-02 NOTE — PROGRESS NOTE BEHAVIORAL HEALTH - NSBHPTASSESSDT_PSY_A_CORE
07-Apr-2018 11:23
08-Apr-2018 10:11
09-Apr-2018 13:42
10-Apr-2018 15:01
11-Apr-2018 13:50
12-Apr-2018 12:13
13-Apr-2018 13:51
14-Apr-2018 17:29
16-Apr-2018 14:14
17-Apr-2018 15:08
20-Apr-2018 14:35
21-Apr-2018
23-Apr-2018 14:57
24-Apr-2018 13:55
26-Apr-2018 12:51
27-Apr-2018 12:00
30-Apr-2018 11:50
29-Apr-2018 10:41
02-May-2018 13:29
18-Apr-2018 12:05
25-Apr-2018 10:02
01-May-2018 13:11
28-Apr-2018 17:47
19-Apr-2018 15:19
15-Apr-2018 08:11

## 2018-05-02 NOTE — PROGRESS NOTE BEHAVIORAL HEALTH - AFFECT RANGE
Labile
Labile/Other
Labile
Labile/Full
Labile
Full/Labile
Labile
Full/Labile

## 2018-05-02 NOTE — PROGRESS NOTE BEHAVIORAL HEALTH - NSBHADMITDANGERSELF_PSY_A_CORE
unable to care for self

## 2018-05-02 NOTE — PROGRESS NOTE BEHAVIORAL HEALTH - NSBHFUPTYPE_PSY_A_CORE
Inpatient

## 2018-05-02 NOTE — PROGRESS NOTE BEHAVIORAL HEALTH - LANGUAGE
No abnormalities noted

## 2018-05-02 NOTE — PROGRESS NOTE BEHAVIORAL HEALTH - ESTIMATED INTELLIGENCE
Average

## 2018-05-02 NOTE — PROGRESS NOTE BEHAVIORAL HEALTH - MOOD
Other
Other
Other/Anxious/Irritable/Angry
Other
Other/Angry/Irritable
Other
Normal
Other
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
## 2018-05-02 NOTE — PROGRESS NOTE BEHAVIORAL HEALTH - EYE CONTACT
Good
Good
Fair
Good
Fair
Good
Good
Fair
Fair
Good
Fair
Good
Fair
Good
Good
Fair
Good

## 2018-05-02 NOTE — PROGRESS NOTE BEHAVIORAL HEALTH - MUSCLE TONE / STRENGTH
Normal muscle tone/strength

## 2018-05-02 NOTE — PROGRESS NOTE BEHAVIORAL HEALTH - NS ED BHA AXIS I PRIMARY CODE FT
F31.9
F31.60
F31.60
F31.9
F31.9
F31.60
F31.9
F31.60
F31.9
F31.9
F31.60
F31.9

## 2018-05-02 NOTE — PROGRESS NOTE BEHAVIORAL HEALTH - RELATEDNESS
Good
Good
Fair
Good
Good
Poor
Good
Fair
Fair
Good
Fair
Good
Fair
Good
Good
Poor
Good

## 2018-05-02 NOTE — PROGRESS NOTE BEHAVIORAL HEALTH - THOUGHT PROCESS
Linear/Other/Tangential
Other/Linear/Tangential
Tangential/Linear/Other
Linear/Tangential/Other
Linear
Linear/Other/Tangential
Other/Tangential/Linear
Linear/Tangential/Other
Tangential/Linear/Other
Linear
Tangential/Other/Linear
Linear/Tangential/Other
Tangential/Linear/Other
Tangential/Other/Linear
Other/Linear/Tangential
Tangential/Other/Linear
Linear/Tangential/Other
Other/Tangential/Linear
Linear/Tangential/Other
Tangential/Linear
Linear/Tangential/Other
Other/Linear/Tangential
Linear/Other/Tangential
Tangential/Linear/Other
Linear/Other/Tangential

## 2018-05-02 NOTE — PROGRESS NOTE BEHAVIORAL HEALTH - NSBHADMITIPREASON_PSY_A_CORE
Danger to self; mental illness expected to respond to inpatient care
manic and aggressive/Danger to others; mental illness expected to respond to inpatient care
Danger to self; mental illness expected to respond to inpatient care
Danger to others; mental illness expected to respond to inpatient care
Danger to self; mental illness expected to respond to inpatient care

## 2018-05-02 NOTE — PROGRESS NOTE BEHAVIORAL HEALTH - GAIT / STATION
Normal gait / station

## 2018-05-02 NOTE — PROGRESS NOTE BEHAVIORAL HEALTH - THOUGHT ASSOCIATIONS
Normal
Loose
Normal
Normal
Loose
Loose
Normal
Normal
Loose
Normal
Loose
Normal

## 2018-05-02 NOTE — PROGRESS NOTE BEHAVIORAL HEALTH - NS ED BHA MSE GENERAL APPEARANCE
Well developed

## 2018-05-02 NOTE — PROGRESS NOTE BEHAVIORAL HEALTH - JUDGMENT (REGARDING EVERYDAY EVENTS)
Fair
Poor
Fair
Poor
Fair
Good
Fair

## 2018-05-02 NOTE — PROGRESS NOTE BEHAVIORAL HEALTH - IMPULSE CONTROL
Impaired
Normal
Impaired
Normal
Normal

## 2018-05-02 NOTE — PROGRESS NOTE BEHAVIORAL HEALTH - NSBHFUPVIOL_PSY_A_CORE
none known
yes
none known

## 2018-05-02 NOTE — PROGRESS NOTE BEHAVIORAL HEALTH - ABNORMAL MOVEMENTS
No abnormal movements

## 2018-05-02 NOTE — PROGRESS NOTE BEHAVIORAL HEALTH - FUND OF KNOWLEDGE
Normal

## 2018-05-02 NOTE — PROGRESS NOTE BEHAVIORAL HEALTH - NSBHLEGALSTATUS_PSY_A_CORE
9.27 (2PC)
9.39 (Emergency)
9.27 (2PC)
9.39 (Emergency)
9.27 (2PC)
9.39 (Emergency)
9.27 (2PC)
9.39 (Emergency)
9.39 (Emergency)
9.27 (2PC)
9.39 (Emergency)
9.39 (Emergency)

## 2018-05-02 NOTE — PROGRESS NOTE BEHAVIORAL HEALTH - INSIGHT (REGARDING PSYCHIATRIC ILLNESS)
Fair
Poor
Fair
Poor
Fair
Good
Fair
Poor
Fair

## 2018-05-02 NOTE — PROGRESS NOTE BEHAVIORAL HEALTH - BEHAVIOR
Uncooperative/Other
Other/Cooperative
Other/Cooperative
Uncooperative/Other
Cooperative/Other
Hostile/Uncooperative
Other/Uncooperative
Other/Uncooperative
Uncooperative/Other
Cooperative/Other
Uncooperative/Other
Cooperative/Other
Other/Cooperative
Uncooperative/Other
Cooperative
Other/Uncooperative
Other/Uncooperative
Uncooperative/Hostile
Uncooperative/Other
Other/Cooperative
Cooperative/Other

## 2018-05-02 NOTE — PROGRESS NOTE BEHAVIORAL HEALTH - ATTENTION / CONCENTRATION
Impaired
Normal
Impaired
Normal
Impaired
Impaired

## 2018-05-02 NOTE — PROGRESS NOTE BEHAVIORAL HEALTH - NS ED BHA MSE SPEECH SPONTANEITY
Normal
Other
Normal
Other
Normal
Other
Normal
Normal
Other
Normal

## 2018-05-02 NOTE — PROGRESS NOTE BEHAVIORAL HEALTH - NSBHADMITIPBHPROVFT_PSY_A_CORE
as noted
dr cota

## 2018-05-02 NOTE — PROGRESS NOTE BEHAVIORAL HEALTH - PRIMARY DX
Bipolar affective disorder, current episode manic, current episode severity unspecified
Bipolar 1 disorder, mixed
Bipolar 1 disorder, mixed
Bipolar affective disorder, current episode manic, current episode severity unspecified
Bipolar affective disorder, current episode manic, current episode severity unspecified
Bipolar 1 disorder, mixed
Bipolar affective disorder, current episode manic, current episode severity unspecified
Bipolar 1 disorder, mixed
Bipolar affective disorder, current episode manic, current episode severity unspecified
Bipolar affective disorder, current episode manic, current episode severity unspecified
Bipolar 1 disorder, mixed
Bipolar affective disorder, current episode manic, current episode severity unspecified

## 2018-05-02 NOTE — PROGRESS NOTE BEHAVIORAL HEALTH - NS ED BHA MSE SPEECH VOLUME
Normal
Loud
Normal
Normal/Loud
Normal
Normal
Loud
Normal

## 2018-05-03 VITALS
TEMPERATURE: 98 F | RESPIRATION RATE: 16 BRPM | SYSTOLIC BLOOD PRESSURE: 131 MMHG | DIASTOLIC BLOOD PRESSURE: 88 MMHG | HEART RATE: 92 BPM

## 2018-05-03 PROBLEM — Z00.00 ENCOUNTER FOR PREVENTIVE HEALTH EXAMINATION: Status: ACTIVE | Noted: 2018-05-03

## 2018-05-03 RX ORDER — QUETIAPINE FUMARATE 200 MG/1
1 TABLET, FILM COATED ORAL
Qty: 30 | Refills: 0
Start: 2018-05-03 | End: 2018-06-01

## 2018-05-03 RX ORDER — OXCARBAZEPINE 300 MG/1
1 TABLET, FILM COATED ORAL
Qty: 60 | Refills: 0
Start: 2018-05-03 | End: 2018-06-01

## 2018-05-03 RX ORDER — CARBAMAZEPINE 200 MG
1 TABLET ORAL
Qty: 30 | Refills: 0
Start: 2018-05-03 | End: 2018-06-01

## 2018-05-03 RX ORDER — ACETAMINOPHEN 500 MG
2 TABLET ORAL
Qty: 0 | Refills: 0 | DISCHARGE
Start: 2018-05-03

## 2018-05-03 RX ORDER — CARBAMAZEPINE 200 MG
1 TABLET ORAL
Qty: 0 | Refills: 0 | COMMUNITY
Start: 2018-05-03

## 2018-05-03 RX ORDER — QUETIAPINE FUMARATE 200 MG/1
5 TABLET, FILM COATED ORAL
Qty: 0 | Refills: 0 | COMMUNITY

## 2018-05-03 RX ORDER — TRAZODONE HCL 50 MG
1 TABLET ORAL
Qty: 30 | Refills: 0
Start: 2018-05-03 | End: 2018-06-01

## 2018-05-03 RX ORDER — OXCARBAZEPINE 300 MG/1
1 TABLET, FILM COATED ORAL
Qty: 0 | Refills: 0 | COMMUNITY

## 2018-05-03 RX ADMIN — Medication 1 APPLICATION(S): at 08:55

## 2018-05-03 RX ADMIN — PANTOPRAZOLE SODIUM 40 MILLIGRAM(S): 20 TABLET, DELAYED RELEASE ORAL at 08:56

## 2018-05-03 RX ADMIN — OXCARBAZEPINE 600 MILLIGRAM(S): 300 TABLET, FILM COATED ORAL at 08:55

## 2018-05-03 RX ADMIN — Medication 200 MILLIGRAM(S): at 08:55

## 2018-05-03 NOTE — DISCHARGE NOTE BEHAVIORAL HEALTH - CARE PROVIDER_API CALL
Radha Potts), [facility]  [address] [place] [address]  Phone: [phone]  Fax: [phone]

## 2018-05-03 NOTE — DISCHARGE NOTE BEHAVIORAL HEALTH - NSBHDCMEDSFT_PSY_A_CORE
Quetiapine (as high as 600mg per day) - now down to Quetiapine 400mg oral qhs  Carbamazepine 200mg oral q24h and 300mg oral qhs  Oxcarbazepine 600mg oral q12h

## 2018-05-03 NOTE — DISCHARGE NOTE BEHAVIORAL HEALTH - PRINCIPAL DIAGNOSIS
Bipolar affective disorder, current episode manic, current episode severity unspecified Bipolar disorder, current episode manic without psychotic features, severe

## 2018-05-03 NOTE — DISCHARGE NOTE BEHAVIORAL HEALTH - MEDICATION SUMMARY - MEDICATIONS TO TAKE
I will START or STAY ON the medications listed below when I get home from the hospital:    acetaminophen 325 mg oral tablet  -- 2 tab(s) by mouth every 6 hours, As needed, Mild Pain (1 - 3) or HA or fever>100.2  -- Indication: For mild pain    aluminum hydroxide-magnesium hydroxide 200 mg-200 mg/5 mL oral suspension  -- 30 milliliter(s) by mouth every 4 hours, As needed, Dyspepsia  -- Indication: For dyspepsia I will START or STAY ON the medications listed below when I get home from the hospital:    acetaminophen 325 mg oral tablet  -- 2 tab(s) by mouth every 6 hours, As needed, Mild Pain (1 - 3) or HA or fever>100.2  -- Indication: For mild pain    aluminum hydroxide-magnesium hydroxide 200 mg-200 mg/5 mL oral suspension  -- 30 milliliter(s) by mouth every 4 hours, As needed, Dyspepsia  -- Indication: For dyspepsia    OXcarbazepine 600 mg oral tablet  -- 1 tab(s) by mouth 2 times a day  -- Indication: For Bipolar disorder, current episode manic without psychotic features, severe    carBAMazepine 200 mg oral tablet  -- 1 tab(s) by mouth once a day . 200mg in the morning. 300mg at night. Total daily dose is 500mg.  -- Indication: For Bipolar disorder, current episode manic without psychotic features, severe    carBAMazepine 300 mg oral capsule, extended release  -- 1 cap(s) by mouth once a day (at bedtime)   -- Do not take this drug if you are pregnant.  It is very important that you take or use this exactly as directed.  Do not skip doses or discontinue unless directed by your doctor.  May cause drowsiness.  Alcohol may intensify this effect.  Use care when operating dangerous machinery.  Obtain medical advice before taking any non-prescription drugs as some may affect the action of this medication.  Swallow whole.  Do not crush.  This drug may impair the ability to drive or operate machinery.  Use care until you become familiar with its effects.    -- Indication: For Bipolar disorder, current episode manic without psychotic features, severe    traZODone 100 mg oral tablet  -- 1 tab(s) by mouth once a day (at bedtime), As needed, insomnia  -- Indication: For Insomnia    QUEtiapine 400 mg oral tablet  -- 1 tab(s) by mouth once a day (at bedtime) x 30 days   -- Indication: For Bipolar disorder, current episode manic without psychotic features, severe

## 2018-05-03 NOTE — DISCHARGE NOTE BEHAVIORAL HEALTH - HPI (INCLUDE ILLNESS QUALITY, SEVERITY, DURATION, TIMING, CONTEXT, MODIFYING FACTORS, ASSOCIATED SIGNS AND SYMPTOMS)
33 year old domiciled female with history of bipolar disorder was sent by [facility] for agitated behavior. Patient was reportedly manic, restless, grandiose, recklessly driving, not sleeping as per family and non compliant with her medications. At time of presentation she was observed to be manic, agitated, grandiose, paranoid and has expansive mood, labile affect and aggressive towards staff. Impaired reality testing. 33 year old domiciled female with history of bipolar disorder was sent by [facility] for agitated behavior. Patient was reportedly manic, restless, grandiose, recklessly driving, not sleeping as per family and non compliant with her medications. At time of presentation she was observed to be manic, agitated, grandiose, paranoid and has expansive mood, labile affect and aggressive towards staff. Impaired reality testing. Patient was

## 2018-05-03 NOTE — PROGRESS NOTE ADULT - SUBJECTIVE AND OBJECTIVE BOX
no psychosis or s/h ideation. Pt is improved since admission; clearly states how she understands need for med and treatment compliance.  Pt able to attribute recent irritability to approaching menses.    d/c today with scripts and f/u.    discussed with mother in pt's presence

## 2018-05-03 NOTE — CHART NOTE - NSCHARTNOTEFT_GEN_A_CORE
PT discharged as per treatment plan. PT to return home and will resume treatment at [facility] on 5-4-18.

## 2018-05-03 NOTE — DISCHARGE NOTE BEHAVIORAL HEALTH - MEDICATION SUMMARY - MEDICATIONS TO CHANGE
I will SWITCH the dose or number of times a day I take the medications listed below when I get home from the hospital:    SEROquel 100 mg oral tablet  -- 5 tab(s) by mouth once a day (at bedtime)

## 2018-05-03 NOTE — DISCHARGE NOTE BEHAVIORAL HEALTH - NSBHDCCONDITIONFT_PSY_A_CORE
Improved significantly with mood and appropriate behavior. Patient continues to be intermittently irritable.

## 2018-05-05 DIAGNOSIS — Z91.14 PATIENT'S OTHER NONCOMPLIANCE WITH MEDICATION REGIMEN: ICD-10-CM

## 2018-05-05 DIAGNOSIS — F31.60 BIPOLAR DISORDER, CURRENT EPISODE MIXED, UNSPECIFIED: ICD-10-CM

## 2018-05-05 DIAGNOSIS — F31.9 BIPOLAR DISORDER, UNSPECIFIED: ICD-10-CM

## 2018-10-09 NOTE — PROGRESS NOTE BEHAVIORAL HEALTH - THOUGHT CONTENT
Routing refill request to provider for review/approval because:  Medication is reported/historical    Patients PCP was Dr. Rodríguez, has appointment with Dr. Cortes 
pharmacy states still have not received refill on rx lisinopril before patients appointment.   
Unremarkable
Other/Unremarkable
Unremarkable
Preoccupations/Other
Unremarkable
Other/Preoccupations
Preoccupations/Other
Unremarkable
Other/Preoccupations
Preoccupations/Other
Unremarkable
Preoccupations/Other
Preoccupations/Other/Unremarkable
Unremarkable
Unremarkable

## 2018-12-28 NOTE — DISCHARGE NOTE BEHAVIORAL HEALTH - PAST PSYCHIATRIC HISTORY
IMPROVE VTE Individual Risk Assessment    RISK                                                          Points  [] Previous VTE                                           3  [] Thrombophilia                                        2  [] Lower limb paralysis                              2   [] Current Cancer                                       2   [x] Immobilization > 24 hrs                        1  [] ICU/CCU stay > 24 hours                       1  [x] Age > 60                                                   1    IMPROVE VTE Score: 2  on heparin sq for DVT ppx  on pepcid for GI ppx as above.  HX of inability to tolerate Lithium, and rash from Depakote

## 2019-08-21 ENCOUNTER — EMERGENCY (EMERGENCY)
Facility: HOSPITAL | Age: 34
LOS: 0 days | Discharge: HOME | End: 2019-08-21
Admitting: EMERGENCY MEDICINE

## 2019-08-21 DIAGNOSIS — Z53.21 PROCEDURE AND TREATMENT NOT CARRIED OUT DUE TO PATIENT LEAVING PRIOR TO BEING SEEN BY HEALTH CARE PROVIDER: ICD-10-CM

## 2019-08-21 DIAGNOSIS — Z02.9 ENCOUNTER FOR ADMINISTRATIVE EXAMINATIONS, UNSPECIFIED: ICD-10-CM

## 2019-09-07 ENCOUNTER — INPATIENT (INPATIENT)
Facility: HOSPITAL | Age: 34
LOS: 19 days | Discharge: HOME | End: 2019-09-27
Attending: PSYCHIATRY & NEUROLOGY | Admitting: PSYCHIATRY & NEUROLOGY
Payer: MEDICAID

## 2019-09-07 VITALS
DIASTOLIC BLOOD PRESSURE: 92 MMHG | WEIGHT: 149.91 LBS | RESPIRATION RATE: 19 BRPM | OXYGEN SATURATION: 99 % | HEART RATE: 79 BPM | SYSTOLIC BLOOD PRESSURE: 135 MMHG

## 2019-09-07 DIAGNOSIS — Z03.89 ENCOUNTER FOR OBSERVATION FOR OTHER SUSPECTED DISEASES AND CONDITIONS RULED OUT: ICD-10-CM

## 2019-09-07 DIAGNOSIS — F31.2 BIPOLAR DISORDER, CURRENT EPISODE MANIC SEVERE WITH PSYCHOTIC FEATURES: ICD-10-CM

## 2019-09-07 LAB
ALBUMIN SERPL ELPH-MCNC: 4.6 G/DL — SIGNIFICANT CHANGE UP (ref 3.5–5.2)
ALP SERPL-CCNC: 93 U/L — SIGNIFICANT CHANGE UP (ref 30–115)
ALT FLD-CCNC: 15 U/L — SIGNIFICANT CHANGE UP (ref 0–41)
ANION GAP SERPL CALC-SCNC: 11 MMOL/L — SIGNIFICANT CHANGE UP (ref 7–14)
APAP SERPL-MCNC: <5 UG/ML — LOW (ref 10–30)
AST SERPL-CCNC: 17 U/L — SIGNIFICANT CHANGE UP (ref 0–41)
BASOPHILS # BLD AUTO: 0.03 K/UL — SIGNIFICANT CHANGE UP (ref 0–0.2)
BASOPHILS NFR BLD AUTO: 0.4 % — SIGNIFICANT CHANGE UP (ref 0–1)
BILIRUB SERPL-MCNC: 0.3 MG/DL — SIGNIFICANT CHANGE UP (ref 0.2–1.2)
BUN SERPL-MCNC: 13 MG/DL — SIGNIFICANT CHANGE UP (ref 10–20)
CALCIUM SERPL-MCNC: 9.4 MG/DL — SIGNIFICANT CHANGE UP (ref 8.5–10.1)
CHLORIDE SERPL-SCNC: 103 MMOL/L — SIGNIFICANT CHANGE UP (ref 98–110)
CO2 SERPL-SCNC: 25 MMOL/L — SIGNIFICANT CHANGE UP (ref 17–32)
CREAT SERPL-MCNC: 0.6 MG/DL — LOW (ref 0.7–1.5)
EOSINOPHIL # BLD AUTO: 0.06 K/UL — SIGNIFICANT CHANGE UP (ref 0–0.7)
EOSINOPHIL NFR BLD AUTO: 0.8 % — SIGNIFICANT CHANGE UP (ref 0–8)
ESTIMATED AVERAGE GLUCOSE: 100 MG/DL — SIGNIFICANT CHANGE UP (ref 68–114)
ETHANOL SERPL-MCNC: <10 MG/DL — SIGNIFICANT CHANGE UP
GLUCOSE SERPL-MCNC: 97 MG/DL — SIGNIFICANT CHANGE UP (ref 70–99)
HBA1C BLD-MCNC: 5.1 % — SIGNIFICANT CHANGE UP (ref 4–5.6)
HCG SERPL QL: NEGATIVE — SIGNIFICANT CHANGE UP
HCT VFR BLD CALC: 40.9 % — SIGNIFICANT CHANGE UP (ref 37–47)
HGB BLD-MCNC: 13.9 G/DL — SIGNIFICANT CHANGE UP (ref 12–16)
IMM GRANULOCYTES NFR BLD AUTO: 0.4 % — HIGH (ref 0.1–0.3)
LYMPHOCYTES # BLD AUTO: 1.9 K/UL — SIGNIFICANT CHANGE UP (ref 1.2–3.4)
LYMPHOCYTES # BLD AUTO: 24.8 % — SIGNIFICANT CHANGE UP (ref 20.5–51.1)
MCHC RBC-ENTMCNC: 30.8 PG — SIGNIFICANT CHANGE UP (ref 27–31)
MCHC RBC-ENTMCNC: 34 G/DL — SIGNIFICANT CHANGE UP (ref 32–37)
MCV RBC AUTO: 90.7 FL — SIGNIFICANT CHANGE UP (ref 81–99)
MONOCYTES # BLD AUTO: 0.52 K/UL — SIGNIFICANT CHANGE UP (ref 0.1–0.6)
MONOCYTES NFR BLD AUTO: 6.8 % — SIGNIFICANT CHANGE UP (ref 1.7–9.3)
NEUTROPHILS # BLD AUTO: 5.12 K/UL — SIGNIFICANT CHANGE UP (ref 1.4–6.5)
NEUTROPHILS NFR BLD AUTO: 66.8 % — SIGNIFICANT CHANGE UP (ref 42.2–75.2)
NRBC # BLD: 0 /100 WBCS — SIGNIFICANT CHANGE UP (ref 0–0)
PLATELET # BLD AUTO: 331 K/UL — SIGNIFICANT CHANGE UP (ref 130–400)
POTASSIUM SERPL-MCNC: 4.1 MMOL/L — SIGNIFICANT CHANGE UP (ref 3.5–5)
POTASSIUM SERPL-SCNC: 4.1 MMOL/L — SIGNIFICANT CHANGE UP (ref 3.5–5)
PROT SERPL-MCNC: 7.7 G/DL — SIGNIFICANT CHANGE UP (ref 6–8)
RBC # BLD: 4.51 M/UL — SIGNIFICANT CHANGE UP (ref 4.2–5.4)
RBC # FLD: 11.2 % — LOW (ref 11.5–14.5)
SALICYLATES SERPL-MCNC: <0.3 MG/DL — LOW (ref 4–30)
SODIUM SERPL-SCNC: 139 MMOL/L — SIGNIFICANT CHANGE UP (ref 135–146)
WBC # BLD: 7.66 K/UL — SIGNIFICANT CHANGE UP (ref 4.8–10.8)
WBC # FLD AUTO: 7.66 K/UL — SIGNIFICANT CHANGE UP (ref 4.8–10.8)

## 2019-09-07 PROCEDURE — 90792 PSYCH DIAG EVAL W/MED SRVCS: CPT

## 2019-09-07 PROCEDURE — 99285 EMERGENCY DEPT VISIT HI MDM: CPT

## 2019-09-07 RX ORDER — ACETAMINOPHEN 500 MG
650 TABLET ORAL EVERY 6 HOURS
Refills: 0 | Status: DISCONTINUED | OUTPATIENT
Start: 2019-09-07 | End: 2019-09-27

## 2019-09-07 RX ORDER — INFLUENZA VIRUS VACCINE 15; 15; 15; 15 UG/.5ML; UG/.5ML; UG/.5ML; UG/.5ML
0.5 SUSPENSION INTRAMUSCULAR ONCE
Refills: 0 | Status: COMPLETED | OUTPATIENT
Start: 2019-09-07 | End: 2019-09-10

## 2019-09-07 RX ORDER — HYDROXYZINE HCL 10 MG
50 TABLET ORAL EVERY 6 HOURS
Refills: 0 | Status: DISCONTINUED | OUTPATIENT
Start: 2019-09-07 | End: 2019-09-27

## 2019-09-07 RX ORDER — HALOPERIDOL DECANOATE 100 MG/ML
5 INJECTION INTRAMUSCULAR ONCE
Refills: 0 | Status: COMPLETED | OUTPATIENT
Start: 2019-09-07 | End: 2019-09-07

## 2019-09-07 RX ORDER — HALOPERIDOL DECANOATE 100 MG/ML
5 INJECTION INTRAMUSCULAR EVERY 6 HOURS
Refills: 0 | Status: DISCONTINUED | OUTPATIENT
Start: 2019-09-07 | End: 2019-09-13

## 2019-09-07 RX ORDER — QUETIAPINE FUMARATE 200 MG/1
200 TABLET, FILM COATED ORAL AT BEDTIME
Refills: 0 | Status: DISCONTINUED | OUTPATIENT
Start: 2019-09-07 | End: 2019-09-11

## 2019-09-07 RX ORDER — OXCARBAZEPINE 300 MG/1
600 TABLET, FILM COATED ORAL
Refills: 0 | Status: DISCONTINUED | OUTPATIENT
Start: 2019-09-07 | End: 2019-09-11

## 2019-09-07 RX ORDER — TRAZODONE HCL 50 MG
100 TABLET ORAL AT BEDTIME
Refills: 0 | Status: DISCONTINUED | OUTPATIENT
Start: 2019-09-07 | End: 2019-09-16

## 2019-09-07 RX ADMIN — HALOPERIDOL DECANOATE 5 MILLIGRAM(S): 100 INJECTION INTRAMUSCULAR at 13:01

## 2019-09-07 RX ADMIN — QUETIAPINE FUMARATE 200 MILLIGRAM(S): 200 TABLET, FILM COATED ORAL at 22:19

## 2019-09-07 RX ADMIN — Medication 2 MILLIGRAM(S): at 13:00

## 2019-09-07 NOTE — ED ADULT TRIAGE NOTE - CHIEF COMPLAINT QUOTE
BIBA EMS states "pt was at [facility] and they called EMS for pt acting weird" Pt is on psych medications is waiting for her meds to be filled. Pt does not remember when she last toke her psych meds. PT denies suicidal ideations.

## 2019-09-07 NOTE — H&P ADULT - HISTORY OF PRESENT ILLNESS
Patient is a 35yo F with h/o bipolar d/o, multiple IPP admissions, whom was brought to ED by EMS for erratic behavior at cross [facility]. Patient complained of not being able to find her meds and anorexia.  Patient denied AH/VH/SI/HI. Pt was observed behaving inappropriately as described in ED. Patient required sedation in ED.

## 2019-09-07 NOTE — ED BEHAVIORAL HEALTH ASSESSMENT NOTE - OTHER
hard to assess b/c Pt is uncooperative Pt was seen responding to internal stimuli, but denies AH or VH unable to assess intense eye contact, blank stare

## 2019-09-07 NOTE — ED PROVIDER NOTE - OBJECTIVE STATEMENT
hx from patient and mom Marquis Martin via phone  33 yo F hx of psych BIBA after parents called EMS. Patient states her parents are lying to her and are driving her crazy. Per mom, patient was in a [place] hospital last week for not taking her meds. Mom states patient didn't sleep last night,  throwing furniture and wants patient to be checked out. Patient denies homicidal or suicidal ideations. Patient denies using drugs or alcohol. no fever and no chills.

## 2019-09-07 NOTE — H&P ADULT - NSHPPHYSICALEXAM_GEN_ALL_CORE
Vital Signs Last 24 Hrs  T(C): 35.8 (07 Sep 2019 15:27), Max: 36.7 (07 Sep 2019 12:00)  T(F): 96.4 (07 Sep 2019 15:27), Max: 98 (07 Sep 2019 12:00)  HR: 85 (07 Sep 2019 15:27) (79 - 92)  BP: 126/83 (07 Sep 2019 15:27) (126/83 - 135/92)  BP(mean): --  RR: 18 (07 Sep 2019 13:19) (18 - 19)  SpO2: 97% (07 Sep 2019 13:19) (97% - 99%)    PHYSICAL EXAM:      Constitutional: A&Ox4  Respiratory: cta b/l  Cardiovascular: s1 s2 rrr  Gastrointestinal: soft nt  nd + bs no rebound or guarding  Genitourinary: no cva tenderness  Extremities: normal rom, no edema, calf tenderness  Neurological:no focal deficits  Skin: no rash

## 2019-09-07 NOTE — ED BEHAVIORAL HEALTH ASSESSMENT NOTE - SUICIDE PROTECTIVE FACTORS
Supportive social network or family/Identifies reasons for living/Future oriented/Engaged in work or school

## 2019-09-07 NOTE — ED ADULT NURSE NOTE - CHIEF COMPLAINT QUOTE
BIBA EMS states "pt was at [facility] and they called EMS for pt acting weird" Pt is on psych medications is waiting for her meds to be filled. Pt does not remember when she last toke her psych meds. PT denies suicidal ideations.

## 2019-09-07 NOTE — ED BEHAVIORAL HEALTH ASSESSMENT NOTE - RISK ASSESSMENT
Although Pt denies si/hi, she presents significant risk to self due to inappropriate psychotic and manic behaving, putting self at risk due to very poor judgement. Therefore, Pt has to be admitted to IPP for safety and stabilization.

## 2019-09-07 NOTE — ED PROVIDER NOTE - NS ED ROS FT
Review of Systems    Constitutional: (-) fever, (-) chills  Eyes/ENT: (-) blurry vision, (-) epistaxis, (-) sore throat  Cardiovascular: (-) chest pain, (-) syncope  Respiratory: (-) cough, (-) shortness of breath  Gastrointestinal: (-) pain, (-) nausea, (-) vomiting, (-) diarrhea  Musculoskeletal: (-) neck pain, (-) back pain, (-) joint pain  Integumentary: (-) rash, (-) edema  Neurological: (-) headache, (-) altered mental status  Psychiatric: (-) hallucinations  Allergic/Immunologic: (-) pruritus

## 2019-09-07 NOTE — ED BEHAVIORAL HEALTH ASSESSMENT NOTE - SUMMARY
35yo AW, domiciled, with h/o bipolar d/o, multiple IPP admissions including [facility], BIBA reportedly due to erratic inappropriate behavior at [facility] (as per EMR). Pt herself states that she came in voluntarily, "just walked here" (denies being BIBA) because "I want to get my medications, I could not find them". In ED, Pt was observed behaving inappropriately as described in HPI. On exam, Pt has increased speech latency, tangential t/p, poor attention span, she is selectively mute and appears to be acutely psychotic. I was unable to obtain much collaterals, but family thinks she is non-adherent to meds.

## 2019-09-07 NOTE — H&P ADULT - NSHPLABSRESULTS_GEN_ALL_CORE
13.9   7.66  )-----------( 331      ( 07 Sep 2019 12:02 )             40.9       09-07    139  |  103  |  13  ----------------------------<  97  4.1   |  25  |  0.6<L>    Ca    9.4      07 Sep 2019 12:02    TPro  7.7  /  Alb  4.6  /  TBili  0.3  /  DBili  x   /  AST  17  /  ALT  15  /  AlkPhos  93  09-07

## 2019-09-07 NOTE — ED BEHAVIORAL HEALTH ASSESSMENT NOTE - DETAILS
d/luda'philip from [facility] in [place] 1 week ago will sign out to in-pt team family was called unknown; week-end

## 2019-09-07 NOTE — ED BEHAVIORAL HEALTH ASSESSMENT NOTE - HPI (INCLUDE ILLNESS QUALITY, SEVERITY, DURATION, TIMING, CONTEXT, MODIFYING FACTORS, ASSOCIATED SIGNS AND SYMPTOMS)
35yo AW, domiciled with her family, with h/o bipolar d/o, multiple IPP admissions including several to [facility], BIBA reportedly due to erratic inappropriate behavior at [facility] (as per EMR). Pt herself states that she came in voluntarily, "just walked here" (denies being BIBA) because "I want to get my medications, I could not find them". She reports taking Seroquel, Haldol and Trileptal, but does not remember the doses. Pt was interviewed, EMR reviewed. Pt was seen eating her meal voraciously while sitting on her stretcher. Pt presented oddly related, with increased speech latency and blank stare. Once asked when she was last d/c'd from IPP Pt told me the current date, then started staring at her ID band. She was looking suspiciously at my notes. Pt said that she feels OK but just needs to restart taking her meds "to go to sleep". Then she said that she is going through a lot of work-related stress and had a panic attack earlier today. She admitted "not eating much" because of stress. She denied AH/VH/SI/HI and did not voice any persecutory thoughts.  Pt then asked me to wait with the interview till she finishes her meal. After she was done with eating, I came back to finish the interview. At this point, Pt became selectively mute, with eyes shut, not answering any questions, gesturing, touching her nose, head and face. I was unable to obtain any additional info from the Pt. I called Pt's mother; she said that "we believe she is not taking her meds", but declined to provide details asking to call later. When I called in about 30 min mother did not answer the phone. I left message with ED # asking to call me back. According to MALKA Kendrick who talked to mother earlier today, at home Pt "was not sleeping, was throwing furniture". As per ED staff, Pt told EMT "you are going to be my baby's father".

## 2019-09-07 NOTE — H&P ADULT - NSHPREVIEWOFSYSTEMS_GEN_ALL_CORE
General:	no fever, weight loss,  chills  Skin: no rash, ulcers  Ophthalmologic: no visual changes  ENMT:	no sore throat  Respiratory and Thorax: no cough, wheeze,  sob  Cardiovascular:	no chest pain, palpitations, dizziness  Gastrointestinal:	no nausea, vomiting, diarrhea, abd pain  Genitourinary:	no dysuria, hematuria  Musculoskeletal:	no joint pains  Neurological:	 no  focal weakness, numbness  Psychiatric:	see hpi  Hematology/Lymphatics:	no anemia  Endocrine:	no polyuria, polydipsia

## 2019-09-07 NOTE — H&P ADULT - ASSESSMENT
Patient is a 33yo F with h/o bipolar d/o, multiple IPP admissions, whom is admitted for nandini and psychosis. Pt medically cleared in ED for IPP admit.

## 2019-09-08 LAB
CHOLEST SERPL-MCNC: 193 MG/DL — SIGNIFICANT CHANGE UP (ref 100–200)
HDLC SERPL-MCNC: 70 MG/DL — SIGNIFICANT CHANGE UP
LIPID PNL WITH DIRECT LDL SERPL: 128 MG/DL — SIGNIFICANT CHANGE UP (ref 4–129)
TOTAL CHOLESTEROL/HDL RATIO MEASUREMENT: 2.8 RATIO — LOW (ref 4–5.5)
TRIGL SERPL-MCNC: 80 MG/DL — SIGNIFICANT CHANGE UP (ref 10–149)

## 2019-09-08 PROCEDURE — 99232 SBSQ HOSP IP/OBS MODERATE 35: CPT

## 2019-09-08 RX ADMIN — HALOPERIDOL DECANOATE 5 MILLIGRAM(S): 100 INJECTION INTRAMUSCULAR at 20:04

## 2019-09-08 RX ADMIN — QUETIAPINE FUMARATE 200 MILLIGRAM(S): 200 TABLET, FILM COATED ORAL at 21:21

## 2019-09-08 RX ADMIN — Medication 650 MILLIGRAM(S): at 16:53

## 2019-09-08 RX ADMIN — OXCARBAZEPINE 600 MILLIGRAM(S): 300 TABLET, FILM COATED ORAL at 09:26

## 2019-09-08 RX ADMIN — OXCARBAZEPINE 600 MILLIGRAM(S): 300 TABLET, FILM COATED ORAL at 17:08

## 2019-09-08 RX ADMIN — Medication 650 MILLIGRAM(S): at 17:45

## 2019-09-08 NOTE — PROGRESS NOTE BEHAVIORAL HEALTH - OTHER
intense eye contact, blank stare hard to assess b/c Pt is uncooperative Pt was seen responding to internal stimuli, but denies AH or VH unable to assess

## 2019-09-08 NOTE — PROGRESS NOTE BEHAVIORAL HEALTH - NSBHFUPINTERVALHXFT_PSY_A_CORE
As per nursing staff, pt has been argumentative with peers and staff on the unit, requiring redirection.  Pt has also refused evening dose of Trileptal last night.

## 2019-09-08 NOTE — PROGRESS NOTE BEHAVIORAL HEALTH - NSBHFUPADDHPIFT_PSY_A_CORE
35yo AW, domiciled with her family, with h/o bipolar d/o, multiple IPP admissions including several to [facility], BIBA reportedly due to erratic inappropriate behavior at [facility] (as per EMR).  On evaluation, pt exhibits pressured speech. States that she was recently discharged from a psychiatric unit on 9/3/19 and "could not obtain Haldol." Admits to medication non compliance. States that she has been having a hard time sleeping. Pt seems disorganized, circumstantial, exhibiting poor insight and judgement. Unable to explain what brought her to the hospital. Denied A/V hallucinations.

## 2019-09-09 DIAGNOSIS — F31.12 BIPOLAR DISORDER, CURRENT EPISODE MANIC WITHOUT PSYCHOTIC FEATURES, MODERATE: ICD-10-CM

## 2019-09-09 PROCEDURE — 99231 SBSQ HOSP IP/OBS SF/LOW 25: CPT

## 2019-09-09 RX ADMIN — Medication 50 MILLIGRAM(S): at 13:21

## 2019-09-09 RX ADMIN — OXCARBAZEPINE 600 MILLIGRAM(S): 300 TABLET, FILM COATED ORAL at 20:43

## 2019-09-09 RX ADMIN — OXCARBAZEPINE 600 MILLIGRAM(S): 300 TABLET, FILM COATED ORAL at 05:25

## 2019-09-09 RX ADMIN — QUETIAPINE FUMARATE 200 MILLIGRAM(S): 200 TABLET, FILM COATED ORAL at 21:10

## 2019-09-09 RX ADMIN — HALOPERIDOL DECANOATE 5 MILLIGRAM(S): 100 INJECTION INTRAMUSCULAR at 13:20

## 2019-09-09 RX ADMIN — Medication 2 MILLIGRAM(S): at 09:08

## 2019-09-09 NOTE — PROGRESS NOTE BEHAVIORAL HEALTH - SUMMARY
35yo AW, domiciled, with h/o bipolar d/o, multiple IPP admissions including [facility], BIBA reportedly due to erratic inappropriate behavior at [facility] (as per EMR). Pt herself states that she came in voluntarily, "just walked here" (denies being BIBA) because "I want to get my medications, I could not find them". In ED, pt was observed behaving inappropriately as described in HPI. On exam, pt has increased speech latency, tangential t/p, poor attention span, she is selectively mute and appears to be acutely psychotic. I was unable to obtain much collaterals, but family thinks she is non-adherent to meds.    Upon reassessment, pt continues to be hyperverbal, irritable, tangential with erratic behavior. Patient was recently discharged on 9/3/19 from an inpatient psychiatric hospitalization in [place] and was non-compliant with medications after discharge. Could not obtain collateral to verify medication dosages at discharge, will continue to attempt. At this time, patient presents acutely manic and requires continued IPP admission for safety, stabilization, and medication management.     #Bipolar d/o   -continue Seroquel 200mg po QHS  -c/w trileptal 600mg po bid     #severe agitation  -for severe agitation on amenable to verbal re-direction give haldol 5mg po PRN, atarax 50mg po PRN, ativan 2 mg po PRN, with escalation to IM if patient danger to self/other 35yo AW, domiciled, with h/o bipolar d/o, multiple IPP admissions including [facility], BIBA reportedly due to erratic inappropriate behavior at [facility] (as per EMR). Pt herself states that she came in voluntarily, "just walked here" (denies being BIBA) because "I want to get my medications, I could not find them". In ED, pt was observed behaving inappropriately as described in HPI. On exam, pt has increased speech latency, tangential t/p, poor attention span, she is selectively mute and appears to be acutely psychotic. I was unable to obtain much collaterals, but family thinks she is non-adherent to meds.    Upon reassessment, pt continues to be hyperverbal, irritable, tangential with erratic behavior. Patient was recently discharged on 9/3/19 from an inpatient psychiatric hospitalization in [place] and was non-compliant with medications after discharge. Could not obtain collateral to verify medication dosages at discharge, will continue to attempt. At this time, patient presents acutely manic and requires continued IPP admission for safety, stabilization, and medication management.     #Bipolar d/o   -continue Seroquel 200mg po QHS  -c/w trileptal 600mg po bid  -c/w trazadone 100mg po PRN QHS for insomnia    #severe agitation  -for severe agitation on amenable to verbal re-direction give haldol 5mg po PRN, atarax 50mg po PRN, ativan 2 mg po PRN, with escalation to IM if patient danger to self/other 33yo AW, domiciled, with h/o bipolar d/o, multiple IPP admissions including [facility], BIBA reportedly due to erratic inappropriate behavior at [facility] (as per EMR). Pt herself states that she came in voluntarily, "just walked here" (denies being BIBA) because "I want to get my medications, I could not find them". In ED, pt was observed behaving inappropriately as described in HPI. On exam, pt has increased speech latency, tangential t/p, poor attention span, she is selectively mute and appears to be acutely psychotic. I was unable to obtain much collaterals, but family thinks she is non-adherent to meds.    Upon reassessment, pt continues to be hyperverbal, irritable, tangential with erratic behavior. Patient was recently discharged on 9/3/19 from an inpatient psychiatric hospitalization in [place] and was non-compliant with medications after discharge. Medications were verified and per pharmacy patient never picked up medications (Haldol 1 mg po bid, Seroquel 100mg po qhs, Trileptal 600mg po bid). At this time, patient presents acutely manic and requires continued IPP admission for safety, stabilization, and medication management.     #Bipolar d/o   -continue Seroquel 200mg po QHS  -c/w trileptal 600mg po bid  -c/w trazadone 100mg po PRN QHS for insomnia    #severe agitation  -for severe agitation on amenable to verbal re-direction give haldol 5mg po PRN, atarax 50mg po PRN, ativan 2 mg po PRN, with escalation to IM if patient danger to self/other
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
## 2019-09-09 NOTE — CONSULT NOTE ADULT - SUBJECTIVE AND OBJECTIVE BOX
Consulting Physician:      Reason for consult:      HPI:  Patient is a 35yo F with h/o bipolar d/o, multiple IPP admissions, whom was brought to ED by EMS for erratic behavior at [facility]. Patient complained of not being able to find her meds and anorexia.  Patient denied AH/VH/SI/HI. Pt was observed behaving inappropriately as described in ED. Patient required sedation in ED. (07 Sep 2019 16:25)  known to us on nunit from previous      PAST MEDICAL & SURGICAL HISTORY:  Bipolar affective  No significant past surgical history      MEDICATIONS  (STANDING):  influenza   Vaccine 0.5 milliLiter(s) IntraMuscular once  OXcarbazepine 600 milliGRAM(s) Oral two times a day  QUEtiapine 200 milliGRAM(s) Oral at bedtime    MEDICATIONS  (PRN):  acetaminophen   Tablet .. 650 milliGRAM(s) Oral every 6 hours PRN Mild Pain (1 - 3), Moderate Pain (4 - 6)  aluminum hydroxide/magnesium hydroxide/simethicone Suspension 30 milliLiter(s) Oral every 4 hours PRN Dyspepsia  haloperidol     Tablet 5 milliGRAM(s) Oral every 6 hours PRN agitation  hydrOXYzine hydrochloride 50 milliGRAM(s) Oral every 6 hours PRN anxiety or agitation  LORazepam     Tablet 2 milliGRAM(s) Oral every 6 hours PRN Agitation  traZODone 100 milliGRAM(s) Oral at bedtime PRN insomnia      Allergies    Depakote (Unknown)    Intolerances        SOCIAL HISTORY:    FAMILY HISTORY:  No pertinent family history in first degree relatives      REVIEW OF SYSTEMS:    CONSTITUTIONAL: No fever, weight loss, or fatigue  EYES: No eye pain, visual disturbances, or discharge  ENMT:  No difficulty hearing, tinnitus, vertigo; No sinus or throat pain  NECK: No pain or stiffness  BREASTS: No pain, masses, or nipple discharge  RESPIRATORY: No cough, wheezing, chills or hemoptysis; No shortness of breath  CARDIOVASCULAR: No chest pain, palpitations, dizziness, or leg swelling  GASTROINTESTINAL: No abdominal or epigastric pain. No nausea, vomiting, or hematemesis; No diarrhea or constipation. No melena or hematochezia.  GENITOURINARY: No dysuria, frequency, hematuria, or incontinence  NEUROLOGICAL: No headaches, memory loss, loss of strength, numbness, or tremors  SKIN: No itching, burning, rashes, or lesions   LYMPH NODES: No enlarged glands  ENDOCRINE: No heat or cold intolerance; No hair loss  MUSCULOSKELETAL: No joint pain or swelling; No muscle, back, or extremity pain  PSYCHIATRIC: No depression, anxiety, mood swings, or difficulty sleeping  HEME/LYMPH: No easy bruising, or bleeding gums  ALLERY AND IMMUNOLOGIC: No hives or eczema    Vital Signs Last 24 Hrs  T(C): 37.1 (09 Sep 2019 05:57), Max: 37.1 (09 Sep 2019 05:57)  T(F): 98.7 (09 Sep 2019 05:57), Max: 98.7 (09 Sep 2019 05:57)  HR: 90 (09 Sep 2019 05:57) (87 - 90)  BP: 125/84 (09 Sep 2019 05:57) (119/82 - 125/84)  BP(mean): --  RR: 16 (09 Sep 2019 05:57) (16 - 20)  SpO2: --    PHYSICAL EXAM:    GENERAL: NAD, well-groomed, well-developed  HEAD:  Atraumatic, Normocephalic  EYES: EOMI, PERRLA, conjunctiva and sclera clear  ENMT: No tonsillar erythema, exudates, or enlargement; Moist mucous membranes, Good dentition, No lesions  NECK: Supple, No JVD, Normal thyroid  NERVOUS SYSTEM:  Alert & Oriented X3, Good concentration; Motor Strength 5/5 B/L upper and lower extremities; DTRs 2+ intact and symmetric  CHEST/LUNG: Clear to percussion bilaterally; No rales, rhonchi, wheezing, or rubs  HEART: Regular rate and rhythm; No murmurs, rubs, or gallops  ABDOMEN: Soft, Nontender, Nondistended; Bowel sounds present  EXTREMITIES:  2+ Peripheral Pulses, No clubbing, cyanosis, or edema  LYMPH: No lymphadenopathy noted   SKIN: No rashes or lesions      LABS:                        13.9   7.66  )-----------( 331      ( 07 Sep 2019 12:02 )             40.9     09-07    139  |  103  |  13  ----------------------------<  97  4.1   |  25  |  0.6<L>    Ca    9.4      07 Sep 2019 12:02    TPro  7.7  /  Alb  4.6  /  TBili  0.3  /  DBili  x   /  AST  17  /  ALT  15  /  AlkPhos  93  09-07        I&O's Summary      RADIOLOGY & ADDITIONAL STUDIES:    DVT & GI prophylaxis:    Assessment & Plan:  BIPOLAR DISORDER  Bipolar disorder  No diagnosis on Axis II  Bipolar disorder, current episode manic severe with psychotic features

## 2019-09-10 PROCEDURE — 99231 SBSQ HOSP IP/OBS SF/LOW 25: CPT

## 2019-09-10 RX ADMIN — INFLUENZA VIRUS VACCINE 0.5 MILLILITER(S): 15; 15; 15; 15 SUSPENSION INTRAMUSCULAR at 16:26

## 2019-09-10 RX ADMIN — Medication 100 MILLIGRAM(S): at 20:11

## 2019-09-10 RX ADMIN — QUETIAPINE FUMARATE 200 MILLIGRAM(S): 200 TABLET, FILM COATED ORAL at 20:11

## 2019-09-10 RX ADMIN — OXCARBAZEPINE 600 MILLIGRAM(S): 300 TABLET, FILM COATED ORAL at 17:22

## 2019-09-10 RX ADMIN — Medication 650 MILLIGRAM(S): at 13:48

## 2019-09-10 RX ADMIN — OXCARBAZEPINE 600 MILLIGRAM(S): 300 TABLET, FILM COATED ORAL at 08:32

## 2019-09-10 RX ADMIN — HALOPERIDOL DECANOATE 5 MILLIGRAM(S): 100 INJECTION INTRAMUSCULAR at 17:22

## 2019-09-10 RX ADMIN — Medication 650 MILLIGRAM(S): at 12:47

## 2019-09-10 NOTE — PROGRESS NOTE BEHAVIORAL HEALTH - NSBHFUPINTERVALHXFT_PSY_A_CORE
Chart reviewed, patient seen and examined at bedside. Per nursing staff patient required PO PRN of ativan 2mg , haldol 5mg, atarax 50mg yesterday. Patient is medication compliant and no longer requires 1:1 observation. Patient's uncle visited today, and patient requested letter stating she is currently hospitalized and can not travel at this time, as patient has an upcoming court hearing in [place]. Patient reports past trial with lithium and stated she stopped taking it because it caused her tremors. She states that she was seeing a psychiatrist in [place] by the name of Dr. See and reports that she has been medication compliant, although family states otherwise. Patient reports that she self-taperred from 400mg to 100mg of Seroquel at night because the higher dose was causing morning sedation. She reports that she has responded well in the past to Lamictal and Abilify. Patient continues to present hyperverbal with increased energy, intrusive, and grandiose delusions. She denies HI/SI/AVH.

## 2019-09-10 NOTE — PROGRESS NOTE BEHAVIORAL HEALTH - NSBHCHARTREVIEWLAB_PSY_A_CORE FT
13.9   7.66  )-----------( 331      ( 07 Sep 2019 12:02 )             40.9   09-07    139  |  103  |  13  ----------------------------<  97  4.1   |  25  |  0.6<L>    Ca    9.4      07 Sep 2019 12:02    TPro  7.7  /  Alb  4.6  /  TBili  0.3  /  DBili  x   /  AST  17  /  ALT  15  /  AlkPhos  93  09-07    Hemoglobin A1C with Mean Plasma Glucose (09.07.19 @ 12:02)    Hemoglobin A1C, Whole Blood: 5.1    Mean Plasma Glucose: 100 mg/dL    Lipid Profile (09.08.19 @ 08:45)    Total Cholesterol/HDL Ratio Measurement: 2.8 Ratio    Cholesterol, Serum: 193 mg/dL    Triglycerides, Serum: 80 mg/dL    HDL Cholesterol, Serum: 70    Direct LDL: 128
13.9   7.66  )-----------( 331      ( 07 Sep 2019 12:02 )             40.9   09-07    139  |  103  |  13  ----------------------------<  97  4.1   |  25  |  0.6<L>    Ca    9.4      07 Sep 2019 12:02    TPro  7.7  /  Alb  4.6  /  TBili  0.3  /  DBili  x   /  AST  17  /  ALT  15  /  AlkPhos  93  09-07    Hemoglobin A1C with Mean Plasma Glucose (09.07.19 @ 12:02)    Hemoglobin A1C, Whole Blood: 5.1    Mean Plasma Glucose: 100 mg/dL    Lipid Profile (09.08.19 @ 08:45)    Total Cholesterol/HDL Ratio Measurement: 2.8 Ratio    Cholesterol, Serum: 193 mg/dL    Triglycerides, Serum: 80 mg/dL    HDL Cholesterol, Serum: 70    Direct LDL: 128

## 2019-09-10 NOTE — PROGRESS NOTE ADULT - SUBJECTIVE AND OBJECTIVE BOX
pt stable alert in NAD  no new complaints    BIPOLAR DISORDER  ^MED EVAL  No pertinent family history in first degree relatives  Handoff  MEWS Score  Bipolar affective  Bipolar disorder  Bipolar 1 disorder, manic, moderate  No diagnosis on Axis II  Bipolar disorder, current episode manic severe with psychotic features  No significant past surgical history  MED EVAL  17    HEALTH ISSUES - PROBLEM Dx:  Bipolar 1 disorder, manic, moderate  No diagnosis on Axis II  Bipolar disorder, current episode manic severe with psychotic features        PAST MEDICAL & SURGICAL HISTORY:  Bipolar affective  No significant past surgical history    Depakote (Unknown)      FAMILY HISTORY:  No pertinent family history in first degree relatives      acetaminophen   Tablet .. 650 milliGRAM(s) Oral every 6 hours PRN  aluminum hydroxide/magnesium hydroxide/simethicone Suspension 30 milliLiter(s) Oral every 4 hours PRN  haloperidol     Tablet 5 milliGRAM(s) Oral every 6 hours PRN  hydrOXYzine hydrochloride 50 milliGRAM(s) Oral every 6 hours PRN  influenza   Vaccine 0.5 milliLiter(s) IntraMuscular once  LORazepam     Tablet 2 milliGRAM(s) Oral every 6 hours PRN  OXcarbazepine 600 milliGRAM(s) Oral two times a day  QUEtiapine 200 milliGRAM(s) Oral at bedtime  traZODone 100 milliGRAM(s) Oral at bedtime PRN      T(C): 36.2 (09-10-19 @ 08:05), Max: 36.7 (09-09-19 @ 09:44)  HR: 92 (09-10-19 @ 08:05) (74 - 96)  BP: 121/82 (09-10-19 @ 08:05) (109/74 - 127/87)  RR: 18 (09-10-19 @ 08:05) (16 - 20)  SpO2: --    PE;  general:  no cahnges in nad    Lungs:    Heart:    EXT:    Neuro:    alert nod eficits    13.9  40.9  7.66  13  0.6  4.1  97                      CAPILLARY BLOOD GLUCOSE

## 2019-09-11 LAB — DRUG SCREEN, SERUM: SIGNIFICANT CHANGE UP

## 2019-09-11 PROCEDURE — 99231 SBSQ HOSP IP/OBS SF/LOW 25: CPT

## 2019-09-11 RX ORDER — RISPERIDONE 4 MG/1
1 TABLET ORAL
Refills: 0 | Status: DISCONTINUED | OUTPATIENT
Start: 2019-09-11 | End: 2019-09-12

## 2019-09-11 RX ORDER — OXCARBAZEPINE 300 MG/1
300 TABLET, FILM COATED ORAL
Refills: 0 | Status: DISCONTINUED | OUTPATIENT
Start: 2019-09-11 | End: 2019-09-16

## 2019-09-11 RX ADMIN — Medication 650 MILLIGRAM(S): at 07:49

## 2019-09-11 RX ADMIN — Medication 100 MILLIGRAM(S): at 20:31

## 2019-09-11 RX ADMIN — HALOPERIDOL DECANOATE 5 MILLIGRAM(S): 100 INJECTION INTRAMUSCULAR at 17:38

## 2019-09-11 RX ADMIN — Medication 650 MILLIGRAM(S): at 14:01

## 2019-09-11 RX ADMIN — OXCARBAZEPINE 600 MILLIGRAM(S): 300 TABLET, FILM COATED ORAL at 08:34

## 2019-09-11 RX ADMIN — RISPERIDONE 1 MILLIGRAM(S): 4 TABLET ORAL at 20:15

## 2019-09-11 RX ADMIN — OXCARBAZEPINE 300 MILLIGRAM(S): 300 TABLET, FILM COATED ORAL at 20:14

## 2019-09-11 RX ADMIN — Medication 650 MILLIGRAM(S): at 08:58

## 2019-09-11 NOTE — CHART NOTE - NSCHARTNOTEFT_GEN_A_CORE
Pt. continues to present as disorganized, intrusive, grandiose delusions and other symptoms of nandini.  Pt.'s medication will be adjusted and she will be started on Lithium and Risperdal.  Pt. denies any suicidal or homicidal ideations or any A/V hallucinations.  Pt.'s uncle was given a letter yesterday, by Dr. Arredondo, stating pt. was unable to travel to her scheduled court appearance in [place] due to being in the hospital.  Pt. has been active and present on the unit and is alert and oriented on all spheres.  Upon discharge, she will return home to her family and will continue treatment with her private psychiatrist.  Pt. is not psychiatrically stable for discharge at this time.

## 2019-09-11 NOTE — PROGRESS NOTE BEHAVIORAL HEALTH - NSBHFUPINTERVALHXFT_PSY_A_CORE
Chart reviewed, patient seen and examined at bedside. Pt chart review, she received haldol 5mg at 5 pm yesterday.  Patient is medication compliant and no longer requires 1:1 observation. Pt. was not able to  maintain boundaries with staff members and she was demanding. She was initially superficially cooperative with this MD then became superficially cooperative during initial interview. She reported she agreed yesterday with Risperdal when Dr. Reyna discussed it with her however she refused it now without nay explanation. Then became dismissive.  Then later asked to meet with this MD at her room, accompanied with Ms. Morrell. Pt. was apologetic for her behavior. she reported she will work on her boundaries with staff and she agreed to take Risperdal he reported Depakote cases her to have rash and Lithium worsened her acne. She was agreeable to start Risperdal and taper down Trileptal and Seroquel. She was educated about SE including EPS and weight gain. She denied any SI .HI I P. no auditory ot visual hallucinations.   She reported she does not trust doctors easily/" I have trust issues with doctors" as per pt.

## 2019-09-11 NOTE — PROGRESS NOTE BEHAVIORAL HEALTH - SUMMARY
35yo AW, domiciled, with h/o bipolar d/o, multiple IPP admissions including [facility], BIBA reportedly due to erratic inappropriate behavior at [facility] (as per EMR). Pt herself states that she came in voluntarily, "just walked here" (denies being BIBA) because "I want to get my medications, I could not find them". In ED, pt was observed behaving inappropriately as described in HPI. On exam, pt has increased speech latency, tangential t/p, poor attention span, she is selectively mute and appears to be acutely psychotic. I was unable to obtain much collaterals, but family thinks she is non-adherent to meds.    Upon reassessment, pt continues to be hyperverbal, tangential with elated mood and increased energy. Patient's decompensation is in the setting of poor medication adherence, poor outpatient followup, and recent psychosocial stressor (family traveling outside country and patient left home alone). At this time, patient continues to presents acutely manic and requires continued IPP admission for safety, stabilization, and medication management.     #Bipolar d/o   Taper down  Seroquel to 100mg po tonight   Taper down trileptal 600mg po am and 300 mg tonight then 300 mg bid  trazadone 50mg po PRN QHS for insomnia    #severe agitation  -for severe agitation on amenable to verbal re-direction give haldol 5mg po PRN, atarax 50mg po PRN, ativan 2 mg po PRN, with escalation to IM if patient danger to self/other 33yo AW, domiciled, with h/o bipolar d/o, multiple IPP admissions including [facility], BIBA reportedly due to erratic inappropriate behavior at [facility] (as per EMR). Pt herself states that she came in voluntarily, "just walked here" (denies being BIBA) because "I want to get my medications, I could not find them". In ED, pt was observed behaving inappropriately as described in HPI. On exam, pt has increased speech latency, tangential t/p, poor attention span, she is selectively mute and appears to be acutely psychotic. I was unable to obtain much collaterals, but family thinks she is non-adherent to meds.    Upon reassessment, pt continues to be hyperverbal, tangential with elated mood and increased energy. Patient's decompensation is in the setting of poor medication adherence, poor outpatient followup, and recent psychosocial stressor (family traveling outside country and patient left home alone). At this time, patient continues to presents acutely manic and requires continued IPP admission for safety, stabilization, and medication management.     #Bipolar d/o   Stop Seroquel   Taper down trileptal 600mg po am and 300 mg tonight then 300 mg bid  Start Risperdal 1 mg bid to be titrated up for mood stabilization     #severe agitation  -for severe agitation on amenable to verbal re-direction give haldol 5mg po PRN, atarax 50mg po PRN, ativan 2 mg po PRN, with escalation to IM if patient danger to self/other

## 2019-09-12 PROCEDURE — 99231 SBSQ HOSP IP/OBS SF/LOW 25: CPT

## 2019-09-12 RX ORDER — RISPERIDONE 4 MG/1
2 TABLET ORAL AT BEDTIME
Refills: 0 | Status: DISCONTINUED | OUTPATIENT
Start: 2019-09-12 | End: 2019-09-13

## 2019-09-12 RX ORDER — RISPERIDONE 4 MG/1
2 TABLET ORAL
Refills: 0 | Status: DISCONTINUED | OUTPATIENT
Start: 2019-09-13 | End: 2019-09-16

## 2019-09-12 RX ADMIN — Medication 650 MILLIGRAM(S): at 05:30

## 2019-09-12 RX ADMIN — OXCARBAZEPINE 300 MILLIGRAM(S): 300 TABLET, FILM COATED ORAL at 20:51

## 2019-09-12 RX ADMIN — RISPERIDONE 2 MILLIGRAM(S): 4 TABLET ORAL at 20:49

## 2019-09-12 RX ADMIN — HALOPERIDOL DECANOATE 5 MILLIGRAM(S): 100 INJECTION INTRAMUSCULAR at 13:20

## 2019-09-12 RX ADMIN — Medication 50 MILLIGRAM(S): at 18:11

## 2019-09-12 RX ADMIN — Medication 650 MILLIGRAM(S): at 13:21

## 2019-09-12 RX ADMIN — OXCARBAZEPINE 300 MILLIGRAM(S): 300 TABLET, FILM COATED ORAL at 08:40

## 2019-09-12 RX ADMIN — Medication 650 MILLIGRAM(S): at 04:46

## 2019-09-12 RX ADMIN — RISPERIDONE 1 MILLIGRAM(S): 4 TABLET ORAL at 08:40

## 2019-09-12 NOTE — PROGRESS NOTE ADULT - SUBJECTIVE AND OBJECTIVE BOX
pt stable alert in NAD  no new complaints    BIPOLAR DISORDER  ^MED EVAL  No pertinent family history in first degree relatives  Handoff  MEWS Score  Bipolar affective  Bipolar disorder  Bipolar 1 disorder, manic, moderate  No diagnosis on Axis II  Bipolar disorder, current episode manic severe with psychotic features  No significant past surgical history  MED EVAL  17    HEALTH ISSUES - PROBLEM Dx:  Bipolar 1 disorder, manic, moderate  No diagnosis on Axis II  Bipolar disorder, current episode manic severe with psychotic features        PAST MEDICAL & SURGICAL HISTORY:  Bipolar affective  No significant past surgical history    Depakote (Unknown)      FAMILY HISTORY:  No pertinent family history in first degree relatives      acetaminophen   Tablet .. 650 milliGRAM(s) Oral every 6 hours PRN  aluminum hydroxide/magnesium hydroxide/simethicone Suspension 30 milliLiter(s) Oral every 4 hours PRN  haloperidol     Tablet 5 milliGRAM(s) Oral every 6 hours PRN  hydrOXYzine hydrochloride 50 milliGRAM(s) Oral every 6 hours PRN  LORazepam     Tablet 2 milliGRAM(s) Oral every 6 hours PRN  OXcarbazepine 300 milliGRAM(s) Oral two times a day  risperiDONE   Tablet 1 milliGRAM(s) Oral two times a day  traZODone 100 milliGRAM(s) Oral at bedtime PRN      T(C): 35.6 (09-12-19 @ 05:59), Max: 36 (09-11-19 @ 18:58)  HR: 95 (09-12-19 @ 05:59) (77 - 95)  BP: 106/65 (09-12-19 @ 05:59) (106/65 - 132/88)  RR: 16 (09-12-19 @ 05:59) (16 - 20)  SpO2: --    PE;  general:  carmita cute cahnges still labiel behavior    Lungs:    Heart:    EXT:    Neuro:  no deficits                          CAPILLARY BLOOD GLUCOSE

## 2019-09-12 NOTE — PROGRESS NOTE BEHAVIORAL HEALTH - NSBHFUPINTERVALHXFT_PSY_A_CORE
Chart reviewed, pt seen and examined at bedside. Per nursing staff patient required Haldol 5 po PRN for anxiety and  Trazadone 100mg po prn for insomnia yesterday. No behavioral issues, patient is medication compliant. Upon approach, patient continues to be interruptive, hyperverbal, distracted. Pt reports that she slept 7 hours last night, however complains that her sleep is disrupted as it is too loud on the unit. Patient states she needed the haldol because it makes her "mind slow down." Discussed with patient importance of boundary setting with other patients. Pt denies any medication side effects and has no other concerns at this time. She denies SI/HI/AVH.

## 2019-09-12 NOTE — PROGRESS NOTE BEHAVIORAL HEALTH - SUMMARY
35yo AW, domiciled, with h/o bipolar d/o, multiple IPP admissions including [facility], BIBA reportedly due to erratic inappropriate behavior at [facility] (as per EMR). Pt herself states that she came in voluntarily, "just walked here" (denies being BIBA) because "I want to get my medications, I could not find them". In ED, pt was observed behaving inappropriately as described in HPI. On exam, pt has increased speech latency, tangential t/p, poor attention span, she is selectively mute and appears to be acutely psychotic. I was unable to obtain much collaterals, but family thinks she is non-adherent to meds.    Upon reassessment, pt continues to be hyperverbal, easily distracted, tangential with elated mood and increased energy. Patient's decompensation is in the setting of poor medication adherence, poor outpatient followup, and recent psychosocial stressor (family traveling outside country and patient left home alone). At this time, patient continues to presents acutely manic and requires continued IPP admission for safety, stabilization, and medication management.     #Bipolar d/o   -c/w Risperdal 1 mg po bid, with plans to increase to 2 mg po bid carlos   -c/w trileptal 300mg po bid  -c/w trazadone 100mg po PRN QHS for insomnia    #severe agitation  -for severe agitation on amenable to verbal re-direction give haldol 5mg po PRN, atarax 50mg po PRN, ativan 2 mg po PRN, with escalation to IM if patient danger to self/other 33yo AW, domiciled, with h/o bipolar d/o, multiple IPP admissions including [facility], BIBA reportedly due to erratic inappropriate behavior at [facility] (as per EMR). Pt herself states that she came in voluntarily, "just walked here" (denies being BIBA) because "I want to get my medications, I could not find them". In ED, pt was observed behaving inappropriately as described in HPI. On exam, pt has increased speech latency, tangential t/p, poor attention span, she is selectively mute and appears to be acutely psychotic. I was unable to obtain much collaterals, but family thinks she is non-adherent to meds.    Upon reassessment, pt continues to be hyperverbal, easily distracted, tangential with elated mood and increased energy. Patient's decompensation is in the setting of poor medication adherence, poor outpatient followup, and recent psychosocial stressor (family traveling outside country and patient left home alone). At this time, patient continues to presents acutely manic and requires continued IPP admission for safety, stabilization, and medication management.     #Bipolar d/o   -increase Risperdal 1 mg po bid to 1 mg/2mg, with plans to increase to 2 mg po bid carlos   -c/w trileptal 300mg po bid  -c/w trazadone 100mg po PRN QHS for insomnia    #severe agitation  -for severe agitation on amenable to verbal re-direction give haldol 5mg po PRN, atarax 50mg po PRN, ativan 2 mg po PRN, with escalation to IM if patient danger to self/other

## 2019-09-13 PROCEDURE — 99231 SBSQ HOSP IP/OBS SF/LOW 25: CPT

## 2019-09-13 RX ORDER — RISPERIDONE 4 MG/1
0.5 TABLET ORAL EVERY 12 HOURS
Refills: 0 | Status: DISCONTINUED | OUTPATIENT
Start: 2019-09-13 | End: 2019-09-27

## 2019-09-13 RX ADMIN — HALOPERIDOL DECANOATE 5 MILLIGRAM(S): 100 INJECTION INTRAMUSCULAR at 06:20

## 2019-09-13 RX ADMIN — OXCARBAZEPINE 300 MILLIGRAM(S): 300 TABLET, FILM COATED ORAL at 20:24

## 2019-09-13 RX ADMIN — RISPERIDONE 2 MILLIGRAM(S): 4 TABLET ORAL at 20:25

## 2019-09-13 RX ADMIN — RISPERIDONE 2 MILLIGRAM(S): 4 TABLET ORAL at 08:25

## 2019-09-13 RX ADMIN — OXCARBAZEPINE 300 MILLIGRAM(S): 300 TABLET, FILM COATED ORAL at 08:24

## 2019-09-13 NOTE — PROGRESS NOTE BEHAVIORAL HEALTH - NSBHFUPINTERVALHXFT_PSY_A_CORE
Chart reviewed, pt seen and examined at team meeting. Per nursing staff no behavioral issues, pt is medication compliant, required haldol 5mg po x2, atarax 50mg PRN. Pt reports asking for haldol last night because "helps with my racing thoughts." Pt continues to present easily distracted, hyperverbal, however is more easily re-directed, less interruptive. Pt is agreeable with current treatment plan and medication changes. States she has been "going to groups and staying out of trouble." Also reports that she received 7.5 hours of sleep last night. Otherwise no new complaints, denies adverse medication side effects, denies SI/HI/AVH.

## 2019-09-13 NOTE — PROGRESS NOTE BEHAVIORAL HEALTH - SUMMARY
35yo AW, domiciled, with h/o bipolar d/o, multiple IPP admissions including [facility], BIBA reportedly due to erratic inappropriate behavior at [facility] (as per EMR). Pt herself states that she came in voluntarily, "just walked here" (denies being BIBA) because "I want to get my medications, I could not find them". In ED, pt was observed behaving inappropriately as described in HPI. On exam, pt has increased speech latency, tangential t/p, poor attention span, she is selectively mute and appears to be acutely psychotic. I was unable to obtain much collaterals, but family thinks she is non-adherent to meds.    Upon reassessment, pt continues to be hyperverbal, easily distracted, tangential with elated mood and increased energy. Patient's decompensation is in the setting of poor medication adherence, poor outpatient followup, and recent psychosocial stressor (family traveling outside country and patient left home alone). At this time, patient continues to presents acutely manic and requires continued IPP admission for safety, stabilization, and medication management.     #Bipolar d/o   -increase Risperdal 1 mg po bid to 1 mg/2mg, with plans to increase to 2 mg po bid carlos   -c/w trileptal 300mg po bid  -c/w trazadone 100mg po PRN QHS for insomnia    #severe agitation  -for severe agitation on amenable to verbal re-direction give haldol 5mg po PRN, atarax 50mg po PRN, ativan 2 mg po PRN, with escalation to IM if patient danger to self/other 33yo AW, domiciled, with h/o bipolar d/o, multiple IPP admissions including [facility], BIBA reportedly due to erratic inappropriate behavior at [facility] (as per EMR). Pt herself states that she came in voluntarily, "just walked here" (denies being BIBA) because "I want to get my medications, I could not find them". In ED, pt was observed behaving inappropriately as described in HPI. On exam, pt has increased speech latency, tangential t/p, poor attention span, she is selectively mute and appears to be acutely psychotic. I was unable to obtain much collaterals, but family thinks she is non-adherent to meds.    Upon reassessment, pt continues to be hyperverbal, easily distracted, but easily redirected, tangential with elated mood and increased energy. Patient's decompensation is in the setting of poor medication adherence, poor outpatient followup, and recent psychosocial stressor (family traveling outside country and patient left home alone). At this time, patient continues to presents acutely manic and requires continued IPP admission for safety, stabilization, and medication management.     #Bipolar d/o   -increase Risperdal to 2 mg po bid  -c/w trileptal 300mg po bid  -c/w trazadone 100mg po PRN QHS for insomnia    #severe agitation  -for severe agitation on amenable to verbal re-direction give Risperdal 0.5mg po PRN, atarax 50mg po PRN, ativan 2 mg po PRN, with escalation to IM if patient danger to self/other

## 2019-09-14 RX ADMIN — OXCARBAZEPINE 300 MILLIGRAM(S): 300 TABLET, FILM COATED ORAL at 20:29

## 2019-09-14 RX ADMIN — Medication 2 MILLIGRAM(S): at 20:32

## 2019-09-14 RX ADMIN — Medication 50 MILLIGRAM(S): at 20:30

## 2019-09-14 RX ADMIN — OXCARBAZEPINE 300 MILLIGRAM(S): 300 TABLET, FILM COATED ORAL at 08:35

## 2019-09-14 RX ADMIN — RISPERIDONE 2 MILLIGRAM(S): 4 TABLET ORAL at 08:35

## 2019-09-14 RX ADMIN — RISPERIDONE 2 MILLIGRAM(S): 4 TABLET ORAL at 20:29

## 2019-09-15 PROCEDURE — 99231 SBSQ HOSP IP/OBS SF/LOW 25: CPT

## 2019-09-15 RX ADMIN — OXCARBAZEPINE 300 MILLIGRAM(S): 300 TABLET, FILM COATED ORAL at 08:07

## 2019-09-15 RX ADMIN — Medication 2 MILLIGRAM(S): at 20:18

## 2019-09-15 RX ADMIN — Medication 50 MILLIGRAM(S): at 20:18

## 2019-09-15 RX ADMIN — RISPERIDONE 2 MILLIGRAM(S): 4 TABLET ORAL at 20:18

## 2019-09-15 RX ADMIN — OXCARBAZEPINE 300 MILLIGRAM(S): 300 TABLET, FILM COATED ORAL at 20:18

## 2019-09-15 RX ADMIN — RISPERIDONE 2 MILLIGRAM(S): 4 TABLET ORAL at 08:07

## 2019-09-16 PROCEDURE — 99231 SBSQ HOSP IP/OBS SF/LOW 25: CPT

## 2019-09-16 RX ORDER — RISPERIDONE 4 MG/1
3 TABLET ORAL AT BEDTIME
Refills: 0 | Status: DISCONTINUED | OUTPATIENT
Start: 2019-09-16 | End: 2019-09-20

## 2019-09-16 RX ORDER — NICOTINE POLACRILEX 2 MG
2 GUM BUCCAL EVERY 6 HOURS
Refills: 0 | Status: DISCONTINUED | OUTPATIENT
Start: 2019-09-16 | End: 2019-09-27

## 2019-09-16 RX ORDER — LANOLIN ALCOHOL/MO/W.PET/CERES
5 CREAM (GRAM) TOPICAL AT BEDTIME
Refills: 0 | Status: DISCONTINUED | OUTPATIENT
Start: 2019-09-16 | End: 2019-09-27

## 2019-09-16 RX ORDER — NICOTINE POLACRILEX 2 MG
2 GUM BUCCAL EVERY 6 HOURS
Refills: 0 | Status: DISCONTINUED | OUTPATIENT
Start: 2019-09-16 | End: 2019-09-16

## 2019-09-16 RX ORDER — RISPERIDONE 4 MG/1
2 TABLET ORAL DAILY
Refills: 0 | Status: DISCONTINUED | OUTPATIENT
Start: 2019-09-17 | End: 2019-09-20

## 2019-09-16 RX ADMIN — OXCARBAZEPINE 300 MILLIGRAM(S): 300 TABLET, FILM COATED ORAL at 08:03

## 2019-09-16 RX ADMIN — RISPERIDONE 2 MILLIGRAM(S): 4 TABLET ORAL at 08:03

## 2019-09-16 RX ADMIN — RISPERIDONE 3 MILLIGRAM(S): 4 TABLET ORAL at 20:11

## 2019-09-16 NOTE — PROGRESS NOTE BEHAVIORAL HEALTH - SUMMARY
33yo AW, domiciled, with h/o bipolar d/o, multiple IPP admissions including [facility], BIBA reportedly due to erratic inappropriate behavior at [facility] (as per EMR). Pt herself states that she came in voluntarily, "just walked here" (denies being BIBA) because "I want to get my medications, I could not find them". In ED, pt was observed behaving inappropriately as described in HPI. On exam, pt has increased speech latency, tangential t/p, poor attention span, she is selectively mute and appears to be acutely psychotic. I was unable to obtain much collaterals, but family thinks she is non-adherent to meds.    Upon reassessment, pt continues to be hyperverbal, easily distracted, tangential with elated mood and increased energy. Patient's decompensation is in the setting of poor medication adherence, poor outpatient followup, and recent psychosocial stressor (family traveling outside country and patient left home alone). At this time, patient continues to presents acutely manic and requires continued IPP admission for safety, stabilization, and medication management.     #Bipolar d/o   -c/w Risperdal 2 mg po BID  -decrease trileptal 300mg po bid to 300mg po daily   -c/w Trazadone 100mg po PRN QHS for insomnia    #severe agitation  -for severe agitation not responsive to verbal re-direction give Risperdal 0.5mg, atarax 50mg po PRN with escalation to IM if patient danger to self/other 33yo AW, domiciled, with h/o bipolar d/o, multiple IPP admissions including [facility], BIBA reportedly due to erratic inappropriate behavior at [facility] (as per EMR). Pt herself states that she came in voluntarily, "just walked here" (denies being BIBA) because "I want to get my medications, I could not find them". In ED, pt was observed behaving inappropriately as described in HPI. On exam, pt has increased speech latency, tangential t/p, poor attention span, she is selectively mute and appears to be acutely psychotic. I was unable to obtain much collaterals, but family thinks she is non-adherent to meds.    Upon reassessment, pt continues to be hyperverbal, easily distracted, tangential with elated mood and increased energy. Patient's decompensation is in the setting of poor medication adherence, poor outpatient followup, and recent psychosocial stressor (family traveling outside country and patient left home alone). At this time, patient continues to presents acutely manic and requires continued IPP admission for safety, stabilization, and medication management.     #Bipolar d/o   -c/w Risperdal 2 mg po BID  -decrease trileptal 300mg po bid to 300mg po daily   -d/c Trazadone 100mg po PRN QHS for insomnia, start melatonin 5 mg po PRN qhs     #severe agitation  -for severe agitation not responsive to verbal re-direction give Risperdal 0.5mg, atarax 50mg po PRN with escalation to IM if patient danger to self/other

## 2019-09-16 NOTE — PROGRESS NOTE BEHAVIORAL HEALTH - NSBHFUPINTERVALHXFT_PSY_A_CORE
Chart reviewed, pt seen and examined at bedside this AM. Per nursing staff patient is improving, medication compliant, received PRN atarax 50, ativan 2 po last night. On approach, patient continues to present irritable, disruptive, but easily re-directed. Patient expresses concerns about upcoming legal hearing. 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 Patient requesting medication for sleep, states she has trouble falling asleep. Otherwise, no other concerns expressed, she denies any adverse side effects and denies SI/HI/AVH.

## 2019-09-16 NOTE — PROGRESS NOTE ADULT - SUBJECTIVE AND OBJECTIVE BOX
pt stable alert in NAD  no new complaints    BIPOLAR DISORDER  ^MED EVAL  No pertinent family history in first degree relatives  Handoff  MEWS Score  Bipolar affective  Bipolar disorder  Bipolar 1 disorder, manic, moderate  No diagnosis on Axis II  Bipolar disorder, current episode manic severe with psychotic features  No significant past surgical history  MED EVAL  17    HEALTH ISSUES - PROBLEM Dx:  Bipolar 1 disorder, manic, moderate  No diagnosis on Axis II  Bipolar disorder, current episode manic severe with psychotic features        PAST MEDICAL & SURGICAL HISTORY:  Bipolar affective  No significant past surgical history    Depakote (Unknown)      FAMILY HISTORY:  No pertinent family history in first degree relatives      acetaminophen   Tablet .. 650 milliGRAM(s) Oral every 6 hours PRN  aluminum hydroxide/magnesium hydroxide/simethicone Suspension 30 milliLiter(s) Oral every 4 hours PRN  hydrOXYzine hydrochloride 50 milliGRAM(s) Oral every 6 hours PRN  LORazepam     Tablet 2 milliGRAM(s) Oral every 6 hours PRN  OXcarbazepine 300 milliGRAM(s) Oral two times a day  risperiDONE   Tablet 2 milliGRAM(s) Oral two times a day  risperiDONE   Tablet 0.5 milliGRAM(s) Oral every 12 hours PRN  traZODone 100 milliGRAM(s) Oral at bedtime PRN      T(C): 36.7 (09-16-19 @ 08:34), Max: 37 (09-15-19 @ 15:49)  HR: 69 (09-16-19 @ 08:34) (69 - 103)  BP: 116/73 (09-16-19 @ 08:34) (107/73 - 130/75)  RR: 18 (09-16-19 @ 08:34) (18 - 20)  SpO2: --    PE;  general:  ambulating on unit in nad    Lungs:    Heart:    EXT:    Neuro:  alert nod eficits                          CAPILLARY BLOOD GLUCOSE

## 2019-09-16 NOTE — CHART NOTE - NSCHARTNOTEFT_GEN_A_CORE
Pt. continues to present with symptoms of nandini but to a lesser severity.  She also continues to report racing thoughts.  She denies any suicidal or homicidal ideation or any A/v hallucinations.  Pt. presents as preoccupied with a court appearance in [place].  She is requesting a letter be faxed to the courthouse stating she is in the hospital.  The letter has been completed and is waiting on the doctor's signature.  Pt. remains compliant with medication and is active and present on the unit.  She is not yet stable for discharge at this time.

## 2019-09-17 PROCEDURE — 99231 SBSQ HOSP IP/OBS SF/LOW 25: CPT

## 2019-09-17 RX ORDER — OXCARBAZEPINE 300 MG/1
300 TABLET, FILM COATED ORAL ONCE
Refills: 0 | Status: COMPLETED | OUTPATIENT
Start: 2019-09-17 | End: 2019-09-17

## 2019-09-17 RX ADMIN — Medication 650 MILLIGRAM(S): at 11:25

## 2019-09-17 RX ADMIN — Medication 650 MILLIGRAM(S): at 17:08

## 2019-09-17 RX ADMIN — OXCARBAZEPINE 300 MILLIGRAM(S): 300 TABLET, FILM COATED ORAL at 11:02

## 2019-09-17 RX ADMIN — Medication 50 MILLIGRAM(S): at 20:01

## 2019-09-17 RX ADMIN — Medication 5 MILLIGRAM(S): at 02:29

## 2019-09-17 RX ADMIN — RISPERIDONE 2 MILLIGRAM(S): 4 TABLET ORAL at 08:21

## 2019-09-17 RX ADMIN — RISPERIDONE 3 MILLIGRAM(S): 4 TABLET ORAL at 20:01

## 2019-09-17 RX ADMIN — Medication 650 MILLIGRAM(S): at 10:42

## 2019-09-17 RX ADMIN — Medication 650 MILLIGRAM(S): at 17:35

## 2019-09-17 NOTE — PROGRESS NOTE BEHAVIORAL HEALTH - SUMMARY
33yo AW, domiciled, with h/o bipolar d/o, multiple IPP admissions including [facility], BIBA reportedly due to erratic inappropriate behavior at [facility] [place] (as per EMR). Pt herself states that she came in voluntarily, "just walked here" (denies being BIBA) because "I want to get my medications, I could not find them". In ED, pt was observed behaving inappropriately as described in HPI. On exam, pt has increased speech latency, tangential t/p, poor attention span, she is selectively mute and appears to be acutely psychotic. I was unable to obtain much collaterals, but family thinks she is non-adherent to meds.    Upon reassessment, pt continues to be interruptive, easily distracted , tangential however easily re-directed with elated mood and increased energy. Patient's decompensation is in the setting of poor medication adherence, poor outpatient followup, and recent psychosocial stressor (family traveling outside country and patient left home alone). At this time, patient continues to presents acutely manic and requires continued IPP admission for safety, stabilization, and medication management.     #Bipolar d/o   -c/w Risperdal 2 mg po qam, Risperdal 3 mg po QHS  -c/w trileptal 300mg po daily   -c/w melatonin 5 mg po PRN qhs     #severe agitation  -for severe agitation not responsive to verbal re-direction give Risperdal 0.5mg, atarax 50mg po PRN with escalation to IM if patient danger to self/other 33yo AW, domiciled, with h/o bipolar d/o, multiple IPP admissions including [facility], BIBA reportedly due to erratic inappropriate behavior at [facility] (as per EMR). Pt herself states that she came in voluntarily, "just walked here" (denies being BIBA) because "I want to get my medications, I could not find them". In ED, pt was observed behaving inappropriately as described in HPI. On exam, pt has increased speech latency, tangential t/p, poor attention span, she is selectively mute and appears to be acutely psychotic. I was unable to obtain much collaterals, but family thinks she is non-adherent to meds.    Upon reassessment, pt continues to be interruptive, easily distracted , tangential however easily re-directed with elated mood and increased energy. Patient's decompensation is in the setting of poor medication adherence, poor outpatient followup, and recent psychosocial stressor (family traveling outside country and patient left home alone). At this time, patient continues to presents acutely manic and requires continued IPP admission for safety, stabilization, and medication management.     #Bipolar d/o   -c/w Risperdal 2 mg po qam, Risperdal 3 mg po QHS  -c/w trileptal 300mg po daily   -c/w melatonin 5 mg po PRN qhs     #severe agitation  -for severe agitation not responsive to verbal re-direction give Risperdal 0.5mg, atarax 50mg po PRN with escalation to IM if patient danger to self/other

## 2019-09-17 NOTE — PROGRESS NOTE BEHAVIORAL HEALTH - NSBHFUPINTERVALHXFT_PSY_A_CORE
Chart reviewed, pt seen and examined this AM. Per nursing staff no behavioral issues, took melatonin 5 mg last night , pt medication compliant. Upon approach, pt continues to be interruptive and tangential, however easily re-directed. Pt reports she has been attempting to "maintain her boundaries" although she is seen at the nursing station writing notes for the staff and placing them against the glass. Patient reports improved sleep and decreased racing thoughts with the increased dose of Risperdal. Discussed with patient options of other mood stabilizers. 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 Additionally, she expresses concerns about acne with Lithium. Otherwise patient has no other complaints and denies any adverse medication reactions. She denies SI/HI/AVH.

## 2019-09-18 PROCEDURE — 99231 SBSQ HOSP IP/OBS SF/LOW 25: CPT

## 2019-09-18 RX ORDER — LITHIUM CARBONATE 300 MG/1
300 TABLET, EXTENDED RELEASE ORAL
Refills: 0 | Status: DISCONTINUED | OUTPATIENT
Start: 2019-09-18 | End: 2019-09-20

## 2019-09-18 RX ADMIN — LITHIUM CARBONATE 300 MILLIGRAM(S): 300 TABLET, EXTENDED RELEASE ORAL at 20:17

## 2019-09-18 RX ADMIN — RISPERIDONE 3 MILLIGRAM(S): 4 TABLET ORAL at 20:17

## 2019-09-18 RX ADMIN — RISPERIDONE 2 MILLIGRAM(S): 4 TABLET ORAL at 09:56

## 2019-09-18 NOTE — PROGRESS NOTE ADULT - SUBJECTIVE AND OBJECTIVE BOX
pt stable alert in NAD  no new complaints    BIPOLAR DISORDER  ^BIPOLAR DISORDER  No pertinent family history in first degree relatives  Handoff  MEWS Score  Bipolar affective  Bipolar disorder  Bipolar 1 disorder, manic, moderate  No diagnosis on Axis II  Bipolar disorder, current episode manic severe with psychotic features  No significant past surgical history  MED EVAL  17    HEALTH ISSUES - PROBLEM Dx:  Bipolar 1 disorder, manic, moderate  No diagnosis on Axis II  Bipolar disorder, current episode manic severe with psychotic features        PAST MEDICAL & SURGICAL HISTORY:  Bipolar affective  No significant past surgical history    Depakote (Unknown)      FAMILY HISTORY:  No pertinent family history in first degree relatives      acetaminophen   Tablet .. 650 milliGRAM(s) Oral every 6 hours PRN  aluminum hydroxide/magnesium hydroxide/simethicone Suspension 30 milliLiter(s) Oral every 4 hours PRN  hydrOXYzine hydrochloride 50 milliGRAM(s) Oral every 6 hours PRN  melatonin. 5 milliGRAM(s) Oral at bedtime PRN  nicotine  Polacrilex Gum 2 milliGRAM(s) Oral every 6 hours PRN  risperiDONE   Tablet 0.5 milliGRAM(s) Oral every 12 hours PRN  risperiDONE   Tablet 3 milliGRAM(s) Oral at bedtime  risperiDONE   Tablet 2 milliGRAM(s) Oral daily      T(C): 35.6 (09-18-19 @ 06:35), Max: 37.1 (09-17-19 @ 08:45)  HR: 98 (09-18-19 @ 06:35) (89 - 100)  BP: 115/79 (09-18-19 @ 06:35) (115/79 - 125/90)  RR: 20 (09-18-19 @ 06:35) (17 - 20)  SpO2: --    PE;  general:stable ambulating on unit in nad    Lungs:    Heart:    EXT:    Neuro:  alert no defciits                          CAPILLARY BLOOD GLUCOSE

## 2019-09-18 NOTE — PROGRESS NOTE BEHAVIORAL HEALTH - NSBHFUPINTERVALHXFT_PSY_A_CORE
Chart reviewed, pt seen and examined this AM. Per nursing staff no behavioral issues, took Atarax at 8 pm for anxiety  , pt medication compliant.  Per nursing Pt. took some of her clothes and she was not redirectable . Pt. reported she felt hot yesterday. She had hyperverbal speech however redirectable ,she described her mood as happy , she denied any current SI or plans. She agreed to start Lithium , and will fu with team closely for ACNE . She denied any AVH. She asked team not to discuss her treatment with team anymore and she rescinded release of information ot her uncle. Only permitted her sister and brother in law,

## 2019-09-18 NOTE — PROGRESS NOTE ADULT - PROBLEM SELECTOR PLAN 1
continue present treatment as per psych plan as reviewed  Medically stable with no new changes in treatment  will continue to monitor medical status while being treated on psych\  still bizaar behavior

## 2019-09-18 NOTE — CHART NOTE - NSCHARTNOTEFT_GEN_A_CORE
Pt. continues to present with manic symptoms.  Her speech is hyperverbal.  As per nursing staff report, pt. attempted to disrobe on the unit.  When the treatment team addressed this with the pt., she stated was hot and was only taking her gown off.  Pt. also states the only family she wants involved in her treatment are her sister and brother in law and rescinded consents for all other family.  Pt. denies any suicidal or homicidal ideations or any A/V hallucinations. She remains active and present on the unit. Upon discharge, she will return home with her family and will follow up with outpatient mental health services.  Pt. is not stable for discharge at this time.

## 2019-09-18 NOTE — PROGRESS NOTE BEHAVIORAL HEALTH - SUMMARY
35yo AW, domiciled, with h/o bipolar d/o, multiple IPP admissions including [facility], BIBA reportedly due to erratic inappropriate behavior at [facility] (as per EMR). Pt herself states that she came in voluntarily, "just walked here" (denies being BIBA) because "I want to get my medications, I could not find them". In ED, pt was observed behaving inappropriately as described in HPI. On exam, pt has increased speech latency, tangential t/p, poor attention span, she is selectively mute and appears to be acutely psychotic. I was unable to obtain much collaterals, but family thinks she is non-adherent to meds.    Upon reassessment, pt continues to be interruptive, easily distracted , tangential however easily re-directed with elated mood and increased energy. Patient's decompensation is in the setting of poor medication adherence, poor outpatient followup, and recent psychosocial stressor (family traveling outside country and patient left home alone). At this time, patient continues to presents acutely manic and requires continued IPP admission for safety, stabilization, and medication management.     #Bipolar d/o   -c/w Risperdal 2 mg po qam, Risperdal 3 mg po QHS  start Lithium 300 mg BID   -c/w melatonin 5 mg po PRN qhs     #severe agitation  -for severe agitation not responsive to verbal re-direction give Risperdal 0.5mg, atarax 50mg po PRN with escalation to IM if patient danger to self/other

## 2019-09-19 PROCEDURE — 99231 SBSQ HOSP IP/OBS SF/LOW 25: CPT

## 2019-09-19 RX ORDER — RISPERIDONE 4 MG/1
25 TABLET ORAL ONCE
Refills: 0 | Status: COMPLETED | OUTPATIENT
Start: 2019-09-20 | End: 2019-09-20

## 2019-09-19 RX ADMIN — LITHIUM CARBONATE 300 MILLIGRAM(S): 300 TABLET, EXTENDED RELEASE ORAL at 09:36

## 2019-09-19 RX ADMIN — RISPERIDONE 3 MILLIGRAM(S): 4 TABLET ORAL at 21:01

## 2019-09-19 RX ADMIN — RISPERIDONE 2 MILLIGRAM(S): 4 TABLET ORAL at 08:37

## 2019-09-19 RX ADMIN — LITHIUM CARBONATE 300 MILLIGRAM(S): 300 TABLET, EXTENDED RELEASE ORAL at 21:00

## 2019-09-19 RX ADMIN — Medication 2 MILLIGRAM(S): at 13:08

## 2019-09-19 NOTE — PROGRESS NOTE BEHAVIORAL HEALTH - SUMMARY
33yo AW, domiciled, with h/o bipolar d/o, multiple IPP admissions including [facility], BIBA reportedly due to erratic inappropriate behavior at [facility] (as per EMR). Pt herself states that she came in voluntarily, "just walked here" (denies being BIBA) because "I want to get my medications, I could not find them". In ED, pt was observed behaving inappropriately as described in HPI. On exam, pt has increased speech latency, tangential t/p, poor attention span, she is selectively mute and appears to be acutely psychotic. I was unable to obtain much collaterals, but family thinks she is non-adherent to meds.    Upon reassessment, pt continues to be interruptive, easily distracted , tangential however easily re-directed with elated mood and increased energy. She no longer complains of racing thoughts or poor sleep. Patient's decompensation is in the setting of poor medication adherence, poor outpatient followup, and recent psychosocial stressor (family traveling outside country and patient left home alone). At this time, patient continues to presents acutely manic and requires continued IPP admission for safety, stabilization, and medication management.     #Bipolar d/o   -c/w Risperdal 2 mg po qam, Risperdal 3 mg po QHS  -start Risperdal Consta 25mg IM  -c/w Lithium 300mg po BID-check lithium levels in 4-6 days  -c/w melatonin 5 mg po PRN qhs     #severe agitation  -for severe agitation not responsive to verbal re-direction give Risperdal 0.5mg, atarax 50mg po PRN with escalation to IM if patient danger to self/other

## 2019-09-19 NOTE — PROGRESS NOTE BEHAVIORAL HEALTH - NSBHFUPINTERVALHXFT_PSY_A_CORE
Chart reviewed, pt seen and examined in room. Upon approach, pt was seen reading a book to another patient. Patient's room also noticed to be extremely disorganized, messy, with multiple papers scattered on her bed. Patient continues to be interruptive, disruptive, has difficulty staying still in one place, irritable at times. Initially she refused AM dose of lithium, but then when offered a second time, pt took her dose. 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 Patient however states she has been on Lamictal in the past, and is interested to switch to Lamictal, as she took in 2008 for about 2-3 years while she was attending [employer] and responded well to the medication without any adverse side effects or rashes while on the medication. She denies SI/HI/AVH and has no other complaints at this time.

## 2019-09-20 PROCEDURE — 99231 SBSQ HOSP IP/OBS SF/LOW 25: CPT

## 2019-09-20 RX ORDER — RISPERIDONE 4 MG/1
3 TABLET ORAL EVERY 12 HOURS
Refills: 0 | Status: DISCONTINUED | OUTPATIENT
Start: 2019-09-20 | End: 2019-09-27

## 2019-09-20 RX ADMIN — RISPERIDONE 2 MILLIGRAM(S): 4 TABLET ORAL at 08:37

## 2019-09-20 RX ADMIN — RISPERIDONE 25 MILLIGRAM(S): 4 TABLET ORAL at 08:36

## 2019-09-20 RX ADMIN — LITHIUM CARBONATE 300 MILLIGRAM(S): 300 TABLET, EXTENDED RELEASE ORAL at 08:37

## 2019-09-20 RX ADMIN — RISPERIDONE 3 MILLIGRAM(S): 4 TABLET ORAL at 20:37

## 2019-09-20 RX ADMIN — Medication 50 MILLIGRAM(S): at 21:31

## 2019-09-20 RX ADMIN — Medication 5 MILLIGRAM(S): at 22:47

## 2019-09-20 NOTE — PROGRESS NOTE BEHAVIORAL HEALTH - NSBHCHARTREVIEWINVESTIGATE_PSY_A_CORE FT
< from: 12 Lead ECG (09.13.19 @ 09:36) >      Ventricular Rate 80 BPM    Atrial Rate 80 BPM    P-R Interval 142 ms    QRS Duration 86 ms    Q-T Interval 378 ms    QTC Calculation(Bezet) 435 ms    P Axis 11 degrees    R Axis 63 degrees    T Axis 39 degrees    Diagnosis Line Normal sinus rhythm  Normal ECG    Confirmed by BHAVESH JOYA, SCARLETT ([id number]) on 9/13/2019 4:23:40 PM    < end of copied text >
< from: 12 Lead ECG (09.13.19 @ 09:36) >    QTC Calculation(Bezet) 435 ms    < end of copied text >
< from: 12 Lead ECG (09.07.19 @ 11:52) >    QTC Calculation(Bezet) 433 ms
< from: 12 Lead ECG (09.13.19 @ 09:36) >    QTC Calculation(Bezet) 435 ms    < end of copied text >
< from: 12 Lead ECG (09.07.19 @ 11:52) >    QTC Calculation(Bezet) 433 ms
< from: 12 Lead ECG (09.07.19 @ 11:52) >    QTC Calculation(Bezet) 433 ms
< from: 12 Lead ECG (09.13.19 @ 09:36) >    QTC Calculation(Bezet) 435 ms    < end of copied text >
< from: 12 Lead ECG (09.13.19 @ 09:36) >    QTC Calculation(Bezet) 435 ms    < end of copied text >    < from: 12 Lead ECG (09.13.19 @ 09:36) >    QTC Calculation(Bezet) 435 ms
< from: 12 Lead ECG (09.07.19 @ 11:52) >    QTC Calculation(Bezet) 433 ms    < end of copied text >

## 2019-09-20 NOTE — CHART NOTE - NSCHARTNOTEFT_GEN_A_CORE
Pt. continues to present with symptoms of nandini but to a lesser severity.  She received her Risperdal Consta injection.  She states she does not want to take Lithium and will not comply with the medication upon discharge.  Pt. denies any suicidal or homicidal ideations or any A/V hallucinations.  She remains active and present on the unit.  Upon discharge, pt. will return to her home and will attend outpatient mental health services.  Pt. is  not yet stable for discharge at this time.

## 2019-09-20 NOTE — PROGRESS NOTE BEHAVIORAL HEALTH - SUMMARY
33yo AW, domiciled, with h/o bipolar d/o, multiple IPP admissions including [facility], BIBA reportedly due to erratic inappropriate behavior at [facility] (as per EMR). Pt herself states that she came in voluntarily, "just walked here" (denies being BIBA) because "I want to get my medications, I could not find them". In ED, pt was observed behaving inappropriately as described in HPI. On exam, pt has increased speech latency, tangential t/p, poor attention span, she is selectively mute and appears to be acutely psychotic. I was unable to obtain much collaterals, but family thinks she is non-adherent to meds.    Upon reassessment, pt continues to be interruptive, easily distracted , tangential however easily re-directed with elated mood and increased energy. She no longer complains of racing thoughts or poor sleep. Patient's decompensation is in the setting of poor medication adherence, poor outpatient followup, and recent psychosocial stressor (family traveling outside country and patient left home alone). At this time, patient continues to presents acutely manic and requires continued IPP admission for safety, stabilization, and medication management.     #Bipolar d/o   - increase Risperdal to  3 mg po BID   -Received Risperdal Consta 25mg IM today, next will be on October 4th  -discontinue Lithium 300mg po BID-as per pt. request and start Lamictal   -c/w melatonin 5 mg po PRN qhs     #severe agitation  -for severe agitation not responsive to verbal re-direction give Risperdal 0.5mg, atarax 50mg po PRN with escalation to IM if patient danger to self/other

## 2019-09-21 RX ADMIN — RISPERIDONE 3 MILLIGRAM(S): 4 TABLET ORAL at 08:01

## 2019-09-21 RX ADMIN — RISPERIDONE 3 MILLIGRAM(S): 4 TABLET ORAL at 20:16

## 2019-09-22 RX ADMIN — RISPERIDONE 3 MILLIGRAM(S): 4 TABLET ORAL at 20:08

## 2019-09-22 RX ADMIN — RISPERIDONE 3 MILLIGRAM(S): 4 TABLET ORAL at 09:07

## 2019-09-23 PROCEDURE — 99231 SBSQ HOSP IP/OBS SF/LOW 25: CPT

## 2019-09-23 RX ADMIN — RISPERIDONE 3 MILLIGRAM(S): 4 TABLET ORAL at 08:10

## 2019-09-23 RX ADMIN — RISPERIDONE 3 MILLIGRAM(S): 4 TABLET ORAL at 20:29

## 2019-09-23 RX ADMIN — Medication 50 MILLIGRAM(S): at 20:30

## 2019-09-23 NOTE — PROGRESS NOTE BEHAVIORAL HEALTH - NSBHFUPINTERVALHXFT_PSY_A_CORE
Patient read letter stating she would like to be discharged, with intermittent bouts of inappropriate laughter, stating that she does not want to stay in "this environment". She reports that she wants to be in another room, then began pointing to other rooms on the unit, stating "no, I want THAT one!". She reports that she does not want to take part in the PHP as she wants to go on "my terms". When asked to clarify, she reported that her terms were "just medication". She denies any SI/HI.

## 2019-09-23 NOTE — PROGRESS NOTE BEHAVIORAL HEALTH - SUMMARY
Summary (include case differential, formulation and patient response to therapy): 35yo AW, domiciled, with h/o bipolar d/o, multiple IPP admissions including [facility], BIBA reportedly due to erratic inappropriate behavior at [facility] (as per EMR). Pt herself states that she came in voluntarily, "just walked here" (denies being BIBA) because "I want to get my medications, I could not find them". In ED, pt was observed behaving inappropriately as described in HPI. On exam, pt has increased speech latency, tangential t/p, poor attention span, she is selectively mute and appears to be acutely psychotic. I was unable to obtain much collaterals, but family thinks she is non-adherent to meds.    Upon reassessment, pt continues to be interruptive, easily distracted , tangential however easily re-directed with elated mood and increased energy. She no longer complains of racing thoughts or poor sleep. Patient's decompensation is in the setting of poor medication adherence, poor outpatient followup, and recent psychosocial stressor (family traveling outside country and patient left home alone). At this time, patient continues to presents acutely manic and requires continued IPP admission for safety, stabilization, and medication management.     #Bipolar d/o   -continue Risperdal to 3 mg po BID, to be d/c'd on 10/4 as she transitions to IM dosing  -Received Risperdal Consta 25mg IM 9/20, next will be on 10/4  -discontinued Lithium 300mg po BID  -c/w melatonin 5 mg po PRN qhs     #Severe agitation  -for severe agitation not responsive to verbal re-direction give Risperdal 0.5mg, atarax 50mg po PRN with escalation to IM if patient danger to self/other.

## 2019-09-23 NOTE — PROGRESS NOTE ADULT - SUBJECTIVE AND OBJECTIVE BOX
pt stable alert in NAD  no new complaints    BIPOLAR DISORDER  ^MED EVAL  No pertinent family history in first degree relatives  Handoff  MEWS Score  Bipolar affective  Bipolar disorder  Bipolar 1 disorder, manic, moderate  No diagnosis on Axis II  Bipolar disorder, current episode manic severe with psychotic features  No significant past surgical history  MED EVAL  17    HEALTH ISSUES - PROBLEM Dx:  Bipolar 1 disorder, manic, moderate  No diagnosis on Axis II  Bipolar disorder, current episode manic severe with psychotic features        PAST MEDICAL & SURGICAL HISTORY:  Bipolar affective  No significant past surgical history    Depakote (Unknown)      FAMILY HISTORY:  No pertinent family history in first degree relatives      acetaminophen   Tablet .. 650 milliGRAM(s) Oral every 6 hours PRN  aluminum hydroxide/magnesium hydroxide/simethicone Suspension 30 milliLiter(s) Oral every 4 hours PRN  hydrOXYzine hydrochloride 50 milliGRAM(s) Oral every 6 hours PRN  melatonin. 5 milliGRAM(s) Oral at bedtime PRN  nicotine  Polacrilex Gum 2 milliGRAM(s) Oral every 6 hours PRN  risperiDONE   Tablet 0.5 milliGRAM(s) Oral every 12 hours PRN  risperiDONE   Tablet 3 milliGRAM(s) Oral every 12 hours      T(C): 35.8 (09-23-19 @ 06:00), Max: 36.8 (09-22-19 @ 16:29)  HR: 83 (09-23-19 @ 06:00) (74 - 83)  BP: 109/66 (09-23-19 @ 06:00) (109/66 - 139/92)  RR: 16 (09-23-19 @ 06:00) (16 - 18)  SpO2: --    PE;  general: no acute changse noted in nad    Lungs:    Heart:    EXT:    Neuro:  aelrt no deficits                          CAPILLARY BLOOD GLUCOSE

## 2019-09-23 NOTE — CHART NOTE - NSCHARTNOTEFT_GEN_A_CORE
Pt. continues to present with symptoms of nandini, though not as severe.  Pt. wrote a letter requesting discharge, however, it has not been officially presented.  She initially refused a referral to [facility] but now states, after speaking with writer, she is willing to accept a referral.  Pt. denies any suicidal or homicidal ideations or any A/V hallucinations.  No behavioral issues have been reported.  There is no set discharge date at this time.

## 2019-09-24 PROCEDURE — 99231 SBSQ HOSP IP/OBS SF/LOW 25: CPT

## 2019-09-24 RX ADMIN — RISPERIDONE 3 MILLIGRAM(S): 4 TABLET ORAL at 20:05

## 2019-09-24 RX ADMIN — Medication 50 MILLIGRAM(S): at 11:19

## 2019-09-24 RX ADMIN — RISPERIDONE 3 MILLIGRAM(S): 4 TABLET ORAL at 08:09

## 2019-09-24 RX ADMIN — Medication 5 MILLIGRAM(S): at 20:05

## 2019-09-24 RX ADMIN — Medication 50 MILLIGRAM(S): at 20:05

## 2019-09-24 NOTE — PROGRESS NOTE BEHAVIORAL HEALTH - NSBHFUPINTERVALHXFT_PSY_A_CORE
Patient seen in the hallway outside of her room. She reports good sleep, though she reports that she was frustrated at her mother last night after they spoke, because her mother was "complaining that she is tired after flying out to see me". She reports that she did cry "a lot" after that conversation but currently feels better. She reported that she sometimes mishears things or hears the things she wants to hear during conversations, stating one instance where last night she was told by a nurse that she would be going home later, but heard it as "not going home", which upset her at the time. Of note, patient was still interrupting writer during course of conversation. She reports she wants to leave earlier and has an outpatient psychiatrist, Dr. See in the [facility] that she would like to follow up with.

## 2019-09-24 NOTE — PROGRESS NOTE BEHAVIORAL HEALTH - SUMMARY
35yo AW, domiciled, with h/o bipolar d/o, multiple IPP admissions including [facility], BIBA reportedly due to erratic inappropriate behavior at [facility] (as per EMR). Pt herself states that she came in voluntarily, "just walked here" (denies being BIBA) because "I want to get my medications, I could not find them". In ED, pt was observed behaving inappropriately as described in HPI. On exam, pt has increased speech latency, tangential t/p, poor attention span, she is selectively mute and appears to be acutely psychotic. I was unable to obtain much collaterals, but family thinks she is non-adherent to meds.    Upon reassessment, pt continues to be interruptive, easily distracted , tangential however easily re-directed with elated mood and increased energy. She no longer complains of racing thoughts or poor sleep. Patient's decompensation is in the setting of poor medication adherence, poor outpatient followup, and recent psychosocial stressor (family traveling outside country and patient left home alone). At this time, patient continues to presents acutely manic and requires continued IPP admission for safety, stabilization, and medication management.     #Bipolar d/o   -continue Risperdal to 3 mg po BID, to be d/c'd on 10/4 as she transitions to IM dosing  -Received Risperdal Consta 25mg IM 9/20, next will be on 10/4  -c/w melatonin 5 mg po PRN qhs     #Severe agitation  -for severe agitation not responsive to verbal re-direction give Risperdal 0.5mg, atarax 50mg po PRN with escalation to IM if patient danger to self/other.

## 2019-09-25 PROCEDURE — 99231 SBSQ HOSP IP/OBS SF/LOW 25: CPT

## 2019-09-25 RX ADMIN — RISPERIDONE 3 MILLIGRAM(S): 4 TABLET ORAL at 08:50

## 2019-09-25 RX ADMIN — RISPERIDONE 3 MILLIGRAM(S): 4 TABLET ORAL at 21:54

## 2019-09-25 RX ADMIN — Medication 50 MILLIGRAM(S): at 03:41

## 2019-09-25 NOTE — PROGRESS NOTE BEHAVIORAL HEALTH - SUMMARY
35yo AW, domiciled, with h/o bipolar d/o, multiple IPP admissions including [facility], BIBA reportedly due to erratic inappropriate behavior at [facility] (as per EMR). Pt herself states that she came in voluntarily, "just walked here" (denies being BIBA) because "I want to get my medications, I could not find them". In ED, pt was observed behaving inappropriately as described in HPI. On exam, pt has increased speech latency, tangential t/p, poor attention span, she is selectively mute and appears to be acutely psychotic. I was unable to obtain much collaterals, but family thinks she is non-adherent to meds.    Upon reassessment, pt continues to be interruptive, easily distracted , tangential however easily re-directed with elated mood and increased energy. She no longer complains of racing thoughts or poor sleep. Patient's decompensation is in the setting of poor medication adherence, poor outpatient followup, and recent psychosocial stressor (family traveling outside country and patient left home alone). At this time, patient continues to presents acutely manic and requires continued IPP admission for safety, stabilization, and medication management.     #Bipolar d/o   -continue Risperdal to 3 mg po BID, to be d/c'd on 10/4 as she transitions to IM dosing  -Received Risperdal Consta 25mg IM 9/20, next will be on 10/4  -c/w melatonin 5 mg po PRN qhs     #Severe agitation  -for severe agitation not responsive to verbal re-direction give Risperdal 0.5mg, atarax 50mg po PRN with escalation to IM if patient danger to self/other.

## 2019-09-25 NOTE — CHART NOTE - NSCHARTNOTEFT_GEN_A_CORE
Pt. is presenting as much less manic.  She is scheduled to be discharged on 9/27 and will follow up with PHP on 9/30.  This discharged plan was discussed with pt. and writer explained the importance of following up with PHP and why the referral to PHP was made.  On 9/24, writer spoke with pt.'s sister, Dawn, to discuss the discharge plan.  Pt. is denying any suicidal or homicidal ideation or any A/V hallucinations.  She presents as stable for discharge on 9/27.

## 2019-09-25 NOTE — PROGRESS NOTE BEHAVIORAL HEALTH - NSBHFUPINTERVALHXFT_PSY_A_CORE
Pt. was interviewed, chart was reviewed. She was well related to this MD and SW. She reported her mood is mainly stable and good, She is less interrupting team, She is able to maintain boundaries. She denied any loss of interest, no SIIP. NO AVH. She expressed excitement about DC on Friday and starting PHP .

## 2019-09-26 PROCEDURE — 99231 SBSQ HOSP IP/OBS SF/LOW 25: CPT

## 2019-09-26 RX ORDER — LANOLIN ALCOHOL/MO/W.PET/CERES
1 CREAM (GRAM) TOPICAL
Qty: 14 | Refills: 0
Start: 2019-09-26 | End: 2019-10-09

## 2019-09-26 RX ORDER — RISPERIDONE 4 MG/1
1 TABLET ORAL
Qty: 16 | Refills: 0
Start: 2019-09-26 | End: 2019-10-03

## 2019-09-26 RX ORDER — HALOPERIDOL DECANOATE 100 MG/ML
0 INJECTION INTRAMUSCULAR
Qty: 0 | Refills: 0 | DISCHARGE

## 2019-09-26 RX ORDER — RISPERIDONE 4 MG/1
1 TABLET ORAL
Qty: 0 | Refills: 0 | DISCHARGE
Start: 2019-09-26

## 2019-09-26 RX ADMIN — Medication 5 MILLIGRAM(S): at 20:48

## 2019-09-26 RX ADMIN — RISPERIDONE 3 MILLIGRAM(S): 4 TABLET ORAL at 20:01

## 2019-09-26 RX ADMIN — RISPERIDONE 3 MILLIGRAM(S): 4 TABLET ORAL at 08:15

## 2019-09-26 NOTE — DISCHARGE NOTE BEHAVIORAL HEALTH - NSTOBACCOREFERRAL_GEN_A_NCS
Yes Yes/Patient registered and referred to the [place] [facility] program. Phone appointment scheduled for 9/30 at 0900.

## 2019-09-26 NOTE — PROGRESS NOTE BEHAVIORAL HEALTH - NSBHATTESTSEENBY_PSY_A_CORE
Attending Psychiatrist supervising NP/Trainee, meeting pt...
Attending Psychiatrist supervising NP/Trainee, meeting pt...
attending Psychiatrist without NP/Trainee
Attending Psychiatrist supervising NP/Trainee, meeting pt...
attending Psychiatrist without NP/Trainee
Attending Psychiatrist supervising NP/Trainee, meeting pt...

## 2019-09-26 NOTE — PROGRESS NOTE BEHAVIORAL HEALTH - RISK ASSESSMENT
Patient is at moderate-high risk of self harm given acute mood disturbance, loss of rational thinking, h/o multiple inpatient hospitalizations, h/o poor compliance, poor judgment.

## 2019-09-26 NOTE — PROGRESS NOTE BEHAVIORAL HEALTH - NSBHADMITIPOBSFT_PSY_A_CORE
per unit protocol
for unpredictable behavior
per unit protocol
for safety
per protocol
per unit protocol

## 2019-09-26 NOTE — PROGRESS NOTE BEHAVIORAL HEALTH - NSBHFUPINTERVALHXFT_PSY_A_CORE
Patient seen and examined. She reports feeling better in the sense that she can now better process what people are saying to her. She reports she got upset at her mother and sister last night because they were telling her that she only has 1 more day until her discharge, which is upsetting her because she would have preferred to leave earlier and spend more time with her family, though she reports she understands the need for leaving tomorrow over today. She slept well last night and Patient seen and examined. She reports feeling better in the sense that she can now better process what people are saying to her. She reports she got upset at her mother and sister last night because they were telling her that she only has 1 more day until her discharge, which is upsetting her because she would have preferred to leave earlier and spend more time with her family, though she reports she understands the need for leaving tomorrow over today. She slept well last night after getting her risperdal. She reports  mild headache which she attributes to her coffee, reports she fills about 5 cups of coffee per day but misplaces them so ends up only drinking about 1 cup's worth over the course of the day. She reports she does get bounds of energy, but only when she has the coffee. She reports she is willing go to go the PHP but that she does have "priorities" and other things to attend to. She reports her previous therapist "abandoned" her, and that she has been abandoned previously but that she wanted "it" away. She asked to clarify what "it" was, she replied that it was her father. However, she reported not wanting to discuss this subject further, wanting to discuss it only with her therapist. Throughout interview, patient was less interruptive than previously, though she would frequently state "done, next question" in a frustrated tone after providing brief answers to a question. She reports feeling ready to leave.

## 2019-09-26 NOTE — PROGRESS NOTE BEHAVIORAL HEALTH - AFFECT QUALITY
Elevated
Elevated
Euthymic
Elevated
Elevated
Irritable
Elevated
Elevated
Euthymic
Euthymic
Irritable
Euthymic
Elevated
Elevated
Euthymic

## 2019-09-26 NOTE — PROGRESS NOTE BEHAVIORAL HEALTH - NSBHFUPTYPE_PSY_A_CORE
Inpatient
Inpatient-On Service Note
Inpatient

## 2019-09-26 NOTE — PROGRESS NOTE BEHAVIORAL HEALTH - FUND OF KNOWLEDGE
Normal
Other
Normal
Other
Other
Normal
Normal

## 2019-09-26 NOTE — PROGRESS NOTE ADULT - SUBJECTIVE AND OBJECTIVE BOX
pt stable alert in NAD  no new complaints    BIPOLAR DISORDER  ^MED EVAL  No pertinent family history in first degree relatives  Handoff  MEWS Score  Bipolar affective  Bipolar disorder  Bipolar 1 disorder, manic, moderate  No diagnosis on Axis II  Bipolar disorder, current episode manic severe with psychotic features  No significant past surgical history  MED EVAL  17    HEALTH ISSUES - PROBLEM Dx:  Bipolar 1 disorder, manic, moderate  No diagnosis on Axis II  Bipolar disorder, current episode manic severe with psychotic features        PAST MEDICAL & SURGICAL HISTORY:  Bipolar affective  No significant past surgical history    Depakote (Unknown)      FAMILY HISTORY:  No pertinent family history in first degree relatives      acetaminophen   Tablet .. 650 milliGRAM(s) Oral every 6 hours PRN  aluminum hydroxide/magnesium hydroxide/simethicone Suspension 30 milliLiter(s) Oral every 4 hours PRN  hydrOXYzine hydrochloride 50 milliGRAM(s) Oral every 6 hours PRN  melatonin. 5 milliGRAM(s) Oral at bedtime PRN  nicotine  Polacrilex Gum 2 milliGRAM(s) Oral every 6 hours PRN  risperiDONE   Tablet 0.5 milliGRAM(s) Oral every 12 hours PRN  risperiDONE   Tablet 3 milliGRAM(s) Oral every 12 hours      T(C): 35.9 (09-26-19 @ 05:47), Max: 35.9 (09-25-19 @ 09:40)  HR: 99 (09-26-19 @ 05:47) (79 - 99)  BP: 119/77 (09-26-19 @ 05:47) (116/72 - 127/90)  RR: 19 (09-26-19 @ 05:47) (18 - 19)  SpO2: --    PE;  general:  no cahnges noted in nad    Lungs:    Heart:    EXT:    Neuro:  aelrt nod eficits                          CAPILLARY BLOOD GLUCOSE

## 2019-09-26 NOTE — PROGRESS NOTE BEHAVIORAL HEALTH - NS ED BHA AXIS I PRIMARY CODE FT
F31.12
F31.2
F31.12

## 2019-09-26 NOTE — DISCHARGE NOTE BEHAVIORAL HEALTH - MEDICATION SUMMARY - MEDICATIONS TO TAKE
I will START or STAY ON the medications listed below when I get home from the hospital:    acetaminophen 325 mg oral tablet  -- 2 tab(s) by mouth every 6 hours, As needed, Mild Pain (1 - 3) or HA or fever>100.2  -- Indication: For pain    aluminum hydroxide-magnesium hydroxide 200 mg-200 mg/5 mL oral suspension  -- 30 milliliter(s) by mouth every 4 hours, As needed, Dyspepsia  -- Indication: For gerd    risperiDONE 3 mg oral tablet  -- 1 tab(s) by mouth every 12 hours until 10/4/19  -- Indication: For BIPOLAR DISORDER I will START or STAY ON the medications listed below when I get home from the hospital:    acetaminophen 325 mg oral tablet  -- 2 tab(s) by mouth every 6 hours, As needed, Mild Pain (1 - 3) or HA or fever>100.2 until you see your PMD  -- Indication: For pain    aluminum hydroxide-magnesium hydroxide 200 mg-200 mg/5 mL oral suspension  -- 30 milliliter(s) by mouth every 4 hours, As needed, Dyspepsia until you see your PMD  -- Indication: For gerd    risperiDONE 3 mg oral tablet  -- 1 tab(s) by mouth every 12 hours until 10/4/19  -- Indication: For BIPOLAR DISORDER

## 2019-09-26 NOTE — PROGRESS NOTE BEHAVIORAL HEALTH - NSBHPTASSESSDT_PSY_A_CORE
08-Sep-2019 09:23
09-Sep-2019 10:38
11-Sep-2019 09:15
12-Sep-2019 14:46
13-Sep-2019 11:43
18-Sep-2019 09:20
19-Sep-2019 12:12
20-Sep-2019 09:45
23-Sep-2019 11:35
24-Sep-2019 10:13
26-Sep-2019 11:23
17-Sep-2019 10:25
10-Sep-2019 14:58
16-Sep-2019 10:50
25-Sep-2019 09:15

## 2019-09-26 NOTE — PROGRESS NOTE BEHAVIORAL HEALTH - SUMMARY
35yo AW, domiciled, with h/o bipolar d/o, multiple IPP admissions including [facility], BIBA reportedly due to erratic inappropriate behavior at [facility] (as per EMR). Pt herself states that she came in voluntarily, "just walked here" (denies being BIBA) because "I want to get my medications, I could not find them". In ED, pt was observed behaving inappropriately as described in HPI. On exam, pt has increased speech latency, tangential t/p, poor attention span, she is selectively mute and appears to be acutely psychotic. I was unable to obtain much collaterals, but family thinks she is non-adherent to meds.    Upon reassessment, pt continues to be interruptive, easily distracted , tangential however easily re-directed with elated mood and increased energy. She no longer complains of racing thoughts or poor sleep. Patient's decompensation is in the setting of poor medication adherence, poor outpatient followup, and recent psychosocial stressor (family traveling outside country and patient left home alone). At this time, patient continues to presents acutely manic and requires continued IPP admission for safety, stabilization, and medication management.     #Bipolar d/o   -continue Risperdal to 3 mg po BID, to be d/c'd on 10/4 as she transitions to IM dosing  -Received Risperdal Consta 25mg IM 9/20, next will be on 10/4  -c/w melatonin 5 mg po PRN qhs     #Severe agitation  -for severe agitation not responsive to verbal re-direction give Risperdal 0.5mg, atarax 50mg po PRN with escalation to IM if patient danger to self/other.

## 2019-09-26 NOTE — PROGRESS NOTE BEHAVIORAL HEALTH - ATTENTION / CONCENTRATION
Normal
Impaired
Normal
Impaired
Normal
Impaired
Normal

## 2019-09-26 NOTE — DISCHARGE NOTE BEHAVIORAL HEALTH - MEDICATION SUMMARY - MEDICATIONS TO STOP TAKING
I will STOP taking the medications listed below when I get home from the hospital:    acetaminophen 325 mg oral tablet  -- 2 tab(s) by mouth every 6 hours, As needed, Mild Pain (1 - 3) or HA or fever>100.2    aluminum hydroxide-magnesium hydroxide 200 mg-200 mg/5 mL oral suspension  -- 30 milliliter(s) by mouth every 4 hours, As needed, Dyspepsia    traZODone 100 mg oral tablet  -- 1 tab(s) by mouth once a day (at bedtime), As needed, insomnia    OXcarbazepine 600 mg oral tablet  -- 1 tab(s) by mouth 2 times a day    QUEtiapine 400 mg oral tablet  -- 1 tab(s) by mouth once a day (at bedtime) x 30 days    carBAMazepine 200 mg oral tablet  -- 1 tab(s) by mouth once a day . 200mg in the morning. 300mg at night. Total daily dose is 500mg.    carBAMazepine 300 mg oral capsule, extended release  -- 1 cap(s) by mouth once a day (at bedtime)   -- Do not take this drug if you are pregnant.  It is very important that you take or use this exactly as directed.  Do not skip doses or discontinue unless directed by your doctor.  May cause drowsiness.  Alcohol may intensify this effect.  Use care when operating dangerous machinery.  Obtain medical advice before taking any non-prescription drugs as some may affect the action of this medication.  Swallow whole.  Do not crush.  This drug may impair the ability to drive or operate machinery.  Use care until you become familiar with its effects.    Haldol

## 2019-09-26 NOTE — DISCHARGE NOTE BEHAVIORAL HEALTH - HPI (INCLUDE ILLNESS QUALITY, SEVERITY, DURATION, TIMING, CONTEXT, MODIFYING FACTORS, ASSOCIATED SIGNS AND SYMPTOMS)
33yo AW, domiciled with her family, with h/o bipolar d/o, multiple IPP admissions including several to [facility], BIBA reportedly due to erratic inappropriate behavior at [facility] (as per EMR). Pt herself states that she came in voluntarily, "just walked here" (denies being BIBA) because "I want to get my medications, I could not find them". She reports taking Seroquel, Haldol and Trileptal, but does not remember the doses. Pt was interviewed, EMR reviewed. Pt was seen eating her meal voraciously while sitting on her stretcher. Pt presented oddly related, with increased speech latency and blank stare. Once asked when she was last d/c'd from IPP Pt told me the current date, then started staring at her ID band. She was looking suspiciously at my notes. Pt said that she feels OK but just needs to restart taking her meds "to go to sleep". Then she said that she is going through a lot of work-related stress and had a panic attack earlier today. She admitted "not eating much" because of stress. She denied AH/VH/SI/HI and did not voice any persecutory thoughts.  Pt then asked me to wait with the interview till she finishes her meal. After she was done with eating, I came back to finish the interview. At this point, Pt became selectively mute, with eyes shut, not answering any questions, gesturing, touching her nose, head and face. I was unable to obtain any additional info from the Pt. I called Pt's mother; she said that "we believe she is not taking her meds", but declined to provide details asking to call later. When I called in about 30 min mother did not answer the phone. I left message with ED # asking to call me back. According to MALKA Kendrick who talked to mother earlier today, at home Pt "was not sleeping, was throwing furniture". As per ED staff, Pt told EMT "you are going to be my baby's father". 35yo AW, domiciled with her family, with h/o bipolar d/o, multiple IPP admissions including several to [facility], BIBA reportedly due to erratic inappropriate behavior at [facility] (as per EMR). Pt herself states that she came in voluntarily, "just walked here" (denies being BIBA) because "I want to get my medications, I could not find them". She reports taking Seroquel, Haldol and Trileptal, but does not remember the doses. Pt was interviewed, EMR reviewed. Pt was seen eating her meal voraciously while sitting on her stretcher. Pt presented oddly related, with increased speech latency and blank stare. Once asked when she was last d/c'd from IPP Pt told me the current date, then started staring at her ID band. She was looking suspiciously at my notes. Pt said that she feels OK but just needs to restart taking her meds "to go to sleep". Then she said that she is going through a lot of work-related stress and had a panic attack earlier today. She admitted "not eating much" because of stress. She denied AH/VH/SI/HI and did not voice any persecutory thoughts.  Pt then asked me to wait with the interview till she finishes her meal. After she was done with eating, I came back to finish the interview. At this point, Pt became selectively mute, with eyes shut, not answering any questions, gesturing, touching her nose, head and face. I was unable to obtain any additional info from the Pt. I called Pt's mother; she said that "we believe she is not taking her meds", but declined to provide details asking to call later. When I called in about 30 min mother did not answer the phone. I left message with ED # asking to call me back. According to MALKA Kendrick who talked to mother earlier today, at home Pt "was not sleeping, was throwing furniture". As per ED staff, Pt told EMT "you are going to be my baby's father".    Pt continues to require 1:1 and received haldol 5mg PRN last night. Upon approach, patient irritable, hyperverbal, tangential, but re-directable. States she was bib ambulance because she was behaving oddly at her gym. Patient reports that she was recently d/c from an inpatient psychiatric hospital in [place], [place] for erratic behavior was found in the middle of the road after her car broke down. After being discharged from the hospital she states she went to [place] to shaw and then returned to [place] where she resides with her brother. In addition to gambling, patient admits to increased spending and shopping. Patient states she was discharged from the hospital on haldol, Seroquel, and trileptal, but reports that she never picked up her medications from the pharmacy. She reports multiple stressors including a pending court hearing on the 19th in [place] for assaulting staff in the hospital. Patient provides 2 non-working number to obtain collateral from sister (Dawn [phone], [phone]). She does not remember the pharmacy location/number in [place] that she was suppose to  her medications from and does not recall the dosage she was on. Patient states that she is allergic to Depakote and does not want to take Lithium due to tremors. She also reports increased acne with current medication regimen. She states that she has tried Abilify in the past with strong response, however stopped taking it because she felt she only needed it while she was in school. Patient denies SI/HI/AVH.    Upon second interview patient was able to provide location and number of [facility] pharmacy in [place] where patient's rx were filled. Per [facility], patient Rx was transferred to [facility] in [place] ( [address], [phone]) and she was prescribed Trileptal 600mg po BID, Seroquel 100mg po QHS, Haldol 1 mg po BID. Per pharmacy patient never picked up Seroquel, Trileptal, and the Haloperidol could not be filled as pharmacy has been having issues getting this medication in stock.    Pt initially continued to be hyperverbal, tangential with elated mood and increased energy. Patient's decompensation was in the setting of poor medication adherence, poor outpatient followup, and recent psychosocial stressor (family traveling outside country and patient left home alone). Patient continued to present acutely manic and required IPP admission for safety, stabilization, and medication management.   Her symptoms have improved significantly while receiving medication, and she is a good candidate for the partial hospitalization program. 35yo AW, domiciled with her family, with h/o bipolar d/o, multiple IPP admissions including several to [facility], BIBA reportedly due to erratic inappropriate behavior at [facility] (as per EMR). Pt herself states that she came in voluntarily, "just walked here" (denies being BIBA) because "I want to get my medications, I could not find them". She reports taking Seroquel, Haldol and Trileptal, but does not remember the doses. Pt was interviewed, EMR reviewed. Pt was seen eating her meal voraciously while sitting on her stretcher. Pt presented oddly related, with increased speech latency and blank stare. Once asked when she was last d/c'd from IPP Pt told me the current date, then started staring at her ID band. She was looking suspiciously at my notes. Pt said that she feels OK but just needs to restart taking her meds "to go to sleep". Then she said that she is going through a lot of work-related stress and had a panic attack earlier today. She admitted "not eating much" because of stress. She denied AH/VH/SI/HI and did not voice any persecutory thoughts. According to MALKA Kendrick who talked to mother earlier today, at home Pt "was not sleeping, was throwing furniture". As per ED staff, Pt told EMT "you are going to be my baby's father". Pt continued to require 1:1 and received haldol 5mg PRN last night. Upon approach, patient irritable, hyperverbal, tangential, but re-directable. States she was bib ambulance because she was behaving oddly at her gym. Patient reports that she was recently d/c from an inpatient psychiatric hospital in [place], [place] for erratic behavior was found in the middle of the road after her car broke down. After being discharged from the hospital she states she went to [place] to shaw and then returned to [place] where she resides with her brother. In addition to gambling, patient admits to increased spending and shopping. Patient states she was discharged from the hospital on haldol, Seroquel, and trileptal, but reports that she never picked up her medications from the pharmacy. She reports multiple stressors including a pending court hearing on the 19th in [place] for assaulting staff in the hospital. Patient provides 2 non-working number to obtain collateral from sister (Dawn [phone], [phone]). She does not remember the pharmacy location/number in [place] that she was suppose to  her medications from and does not recall the dosage she was on. Patient states that she is allergic to Depakote and does not want to take Lithium due to tremors. She also reports increased acne with current medication regimen. She states that she has tried Abilify in the past with strong response, however stopped taking it because she felt she only needed it while she was in school. Patient denied SI/HI/AVH. Per [facility], patient Rx was transferred to [facility] in [place] and she was prescribed Trileptal 600mg po BID, Seroquel 100mg po QHS, Haldol 1 mg po BID. Per pharmacy patient never picked up Seroquel, Trileptal, and the Haloperidol could not be filled as pharmacy has been having issues getting this medication in stock.    Pt initially continued to be hyperverbal, tangential with elated mood and increased energy. Patient's decompensation was in the setting of poor medication adherence, poor outpatient followup, and recent psychosocial stressor (family traveling outside country and patient left home alone). Patient continued to present acutely manic and required IPP admission for safety, stabilization, and medication management. Her symptoms have improved significantly while receiving medication during her admission, with marked improvement in her bizarre behaviors, pressured speech, and speech interruptions. She is amenable to the partial hospitalization program upon discharge, for which she is a good candidate.

## 2019-09-26 NOTE — PROGRESS NOTE BEHAVIORAL HEALTH - NSBHADMITDANGERSELF_PSY_A_CORE
acutely manic/unable to care for self
unable to care for self
unable to care for self/acutely manic
acutely manic/unable to care for self
unable to care for self/acutely manic
acutely manic/unable to care for self
unable to care for self
unable to care for self/acutely manic
acutely manic/unable to care for self
acutely manic/unable to care for self
unable to care for self
unable to care for self
unable to care for self/acutely manic
unable to care for self/acutely manic
unable to care for self

## 2019-09-26 NOTE — PROGRESS NOTE BEHAVIORAL HEALTH - NSBHCHARTREVIEWVS_PSY_A_CORE FT
ICU Vital Signs Last 24 Hrs  T(C): 36 (13 Sep 2019 09:22), Max: 36 (12 Sep 2019 17:47)  T(F): 96.8 (13 Sep 2019 09:22), Max: 96.8 (12 Sep 2019 17:47)  HR: 82 (13 Sep 2019 09:22) (77 - 91)  BP: 114/78 (13 Sep 2019 09:22) (114/78 - 132/88)  BP(mean): --  ABP: --  ABP(mean): --  RR: 16 (13 Sep 2019 09:22) (16 - 20)  SpO2: --
ICU Vital Signs Last 24 Hrs  T(C): 35.7 (19 Sep 2019 09:27), Max: 36.1 (18 Sep 2019 16:31)  T(F): 96.2 (19 Sep 2019 09:27), Max: 97 (18 Sep 2019 16:31)  HR: 85 (19 Sep 2019 09:27) (85 - 101)  BP: 125/75 (19 Sep 2019 09:27) (114/76 - 127/77)  BP(mean): --  ABP: --  ABP(mean): --  RR: 18 (19 Sep 2019 09:27) (18 - 20)  SpO2: --
ICU Vital Signs Last 24 Hrs  T(C): 36.7 (09 Sep 2019 09:44), Max: 37.1 (09 Sep 2019 05:57)  T(F): 98 (09 Sep 2019 09:44), Max: 98.7 (09 Sep 2019 05:57)  HR: 74 (09 Sep 2019 09:44) (74 - 90)  BP: 110/80 (09 Sep 2019 09:44) (110/80 - 125/84)  BP(mean): --  ABP: --  ABP(mean): --  RR: 16 (09 Sep 2019 09:44) (16 - 20)  SpO2: --
Vital Signs Last 24 Hrs  T(C): 35.6 (23 Sep 2019 09:28), Max: 36.8 (22 Sep 2019 16:29)  T(F): 96 (23 Sep 2019 09:28), Max: 98.2 (22 Sep 2019 16:29)  HR: 80 (23 Sep 2019 09:28) (74 - 83)  BP: 130/76 (23 Sep 2019 09:28) (109/66 - 139/92)  BP(mean): --  RR: 16 (23 Sep 2019 09:28) (16 - 18)  SpO2: --
Vital Signs Last 24 Hrs  T(C): 35.8 (11 Sep 2019 08:00), Max: 35.8 (11 Sep 2019 05:56)  T(F): 96.5 (11 Sep 2019 08:00), Max: 96.5 (11 Sep 2019 05:56)  HR: 80 (11 Sep 2019 08:00) (77 - 89)  BP: 115/71 (11 Sep 2019 08:00) (115/71 - 122/92)  BP(mean): --  RR: 20 (11 Sep 2019 08:00) (18 - 20)  SpO2: --
Vital Signs Last 24 Hrs  T(C): 35.8 (24 Sep 2019 09:13), Max: 36.2 (24 Sep 2019 05:48)  T(F): 96.5 (24 Sep 2019 09:13), Max: 97.1 (24 Sep 2019 05:48)  HR: 89 (24 Sep 2019 09:13) (89 - 99)  BP: 118/73 (24 Sep 2019 09:13) (118/73 - 123/89)  BP(mean): --  RR: 18 (24 Sep 2019 09:13) (18 - 18)  SpO2: --
Vital Signs Last 24 Hrs  T(C): 36.1 (18 Sep 2019 09:03), Max: 36.1 (18 Sep 2019 09:03)  T(F): 97 (18 Sep 2019 09:03), Max: 97 (18 Sep 2019 09:03)  HR: 103 (18 Sep 2019 09:03) (89 - 103)  BP: 119/83 (18 Sep 2019 09:03) (115/79 - 125/90)  BP(mean): --  RR: 16 (18 Sep 2019 09:03) (16 - 20)  SpO2: --
Vital Signs Last 24 Hrs  T(C): 37.2 (20 Sep 2019 08:50), Max: 37.2 (20 Sep 2019 08:50)  T(F): 98.9 (20 Sep 2019 08:50), Max: 98.9 (20 Sep 2019 08:50)  HR: 104 (20 Sep 2019 08:50) (76 - 104)  BP: 133/71 (20 Sep 2019 08:50) (116/73 - 133/71)  BP(mean): --  RR: 18 (20 Sep 2019 08:50) (16 - 18)  SpO2: --
ICU Vital Signs Last 24 Hrs  T(C): 35.6 (08 Sep 2019 09:23), Max: 36.7 (07 Sep 2019 12:00)  T(F): 96 (08 Sep 2019 09:23), Max: 98 (07 Sep 2019 12:00)  HR: 87 (08 Sep 2019 09:23) (79 - 95)  BP: 119/82 (08 Sep 2019 09:23) (116/86 - 135/92)  BP(mean): --  ABP: --  ABP(mean): --  RR: 18 (08 Sep 2019 09:23) (16 - 19)  SpO2: 97% (07 Sep 2019 13:19) (97% - 99%)
ICU Vital Signs Last 24 Hrs  T(C): 36.2 (10 Sep 2019 08:05), Max: 36.3 (09 Sep 2019 16:53)  T(F): 97.1 (10 Sep 2019 08:05), Max: 97.3 (09 Sep 2019 16:53)  HR: 92 (10 Sep 2019 08:05) (88 - 96)  BP: 121/82 (10 Sep 2019 08:05) (109/74 - 127/87)  BP(mean): --  ABP: --  ABP(mean): --  RR: 18 (10 Sep 2019 08:05) (18 - 20)  SpO2: --
Vital Signs Last 24 Hrs  T(C): 35.9 (25 Sep 2019 09:40), Max: 36.1 (24 Sep 2019 19:25)  T(F): 96.7 (25 Sep 2019 09:40), Max: 97 (24 Sep 2019 19:25)  HR: 80 (25 Sep 2019 09:40) (79 - 95)  BP: 116/72 (25 Sep 2019 09:40) (106/71 - 116/82)  BP(mean): --  RR: 18 (25 Sep 2019 09:40) (18 - 100)  SpO2: --2: --
ICU Vital Signs Last 24 Hrs  T(C): 36.7 (16 Sep 2019 08:34), Max: 37 (15 Sep 2019 15:49)  T(F): 98.1 (16 Sep 2019 08:34), Max: 98.6 (15 Sep 2019 15:49)  HR: 69 (16 Sep 2019 08:34) (69 - 99)  BP: 116/73 (16 Sep 2019 08:34) (107/73 - 124/71)  BP(mean): --  ABP: --  ABP(mean): --  RR: 18 (16 Sep 2019 08:34) (18 - 20)  SpO2: --
Vital Signs Last 24 Hrs  T(C): 36 (26 Sep 2019 10:17), Max: 36 (26 Sep 2019 10:17)  T(F): 96.8 (26 Sep 2019 10:17), Max: 96.8 (26 Sep 2019 10:17)  HR: 105 (26 Sep 2019 10:17) (79 - 105)  BP: 118/89 (26 Sep 2019 10:17) (118/89 - 127/90)  RR: 16 (26 Sep 2019 10:17) (16 - 19)
ICU Vital Signs Last 24 Hrs  T(C): 37.1 (17 Sep 2019 08:45), Max: 37.2 (16 Sep 2019 17:20)  T(F): 98.7 (17 Sep 2019 08:45), Max: 99 (16 Sep 2019 17:20)  HR: 100 (17 Sep 2019 08:45) (86 - 100)  BP: 124/76 (17 Sep 2019 08:45) (119/85 - 128/74)  BP(mean): --  ABP: --  ABP(mean): --  RR: 18 (17 Sep 2019 08:45) (18 - 20)  SpO2: --
ICU Vital Signs Last 24 Hrs  T(C): 36 (12 Sep 2019 17:47), Max: 36 (12 Sep 2019 17:47)  T(F): 96.8 (12 Sep 2019 17:47), Max: 96.8 (12 Sep 2019 17:47)  HR: 77 (12 Sep 2019 17:47) (73 - 95)  BP: 132/88 (12 Sep 2019 17:47) (106/65 - 132/88)  BP(mean): --  ABP: --  ABP(mean): --  RR: 20 (12 Sep 2019 17:47) (16 - 20)  SpO2: --

## 2019-09-26 NOTE — PROGRESS NOTE BEHAVIORAL HEALTH - NSBHFUPSUICINTERVAL_PSY_A_CORE
none known
pt  with  h/o htn, cad, stents, br  asthma, seizure, cva , ca esophagus,  c/c  cachexia, RA  on prednisone   s./p  recent cath,   with rico, to  LAD and angioplasty, D1, ON 4/17,  an d since then, had  low  bp,, and  was  on midodrine, on last admission  h/o  anemia ,   on c/c . prednisone,, for  RA,    low  bp,  stress dose  hydrocortisone,   does  not  appear to have  sepsis,    ID called  c/.c anemia  Pt  is dnr now,    echo  with modertae AS. / ?  severe   asa/ plavix   given recent pci  ent eval and endo  dr lida cruz, for  low  bp,  and  on  c/c  prednisone at home, noted  per  endo, pt  does  not have adrenal insufficiency   no artificial  feeding per  family.  and pt wants  regular diet  now  with guaiac positive stools, seen by gi,  hb is 8  today
none known

## 2019-09-26 NOTE — PROGRESS NOTE BEHAVIORAL HEALTH - IMPULSE CONTROL
Impaired
Normal
Impaired/Other
Normal
Normal
Impaired
Impaired/Other
Other/Impaired
Normal
Other/Impaired
Impaired
Impaired
Impaired/Other

## 2019-09-26 NOTE — PROGRESS NOTE BEHAVIORAL HEALTH - MODIFICATIONS
see below
seen/discussed with resident.  Pt with manic sx; non compliance.  Will restart meds and monitor

## 2019-09-26 NOTE — PROGRESS NOTE BEHAVIORAL HEALTH - PERCEPTIONS
No abnormalities
Other
No abnormalities

## 2019-09-26 NOTE — PROGRESS NOTE BEHAVIORAL HEALTH - NSBHADMITIPOBS_PSY_A_CORE
Routine observation
Constant observation
Routine observation
Constant observation
Routine observation

## 2019-09-26 NOTE — PROGRESS NOTE BEHAVIORAL HEALTH - NSBHADMITMEDEDU_PSY_A_CORE
yes...
no
yes...

## 2019-09-26 NOTE — DISCHARGE NOTE BEHAVIORAL HEALTH - CARE PROVIDER_API CALL
James Johnson (DO)  Family Medicine  [address] [place] [address]  Phone: [phone]  Fax: [phone]  Follow Up Time:

## 2019-09-26 NOTE — DISCHARGE NOTE BEHAVIORAL HEALTH - PAST PSYCHIATRIC HISTORY
bipolar disorder, h/o multiple ipp including this hospital bipolar disorder, h/o multiple ipp admissions, including this hospital bipolar disorder, h/o multiple IPP admissions, including at [facility]

## 2019-09-26 NOTE — DISCHARGE NOTE BEHAVIORAL HEALTH - NSBHDCDXVALIDYESFT_PSY_A_CORE
Patient was observed to have pressured speech and frequent interruption of others in the midst of a conversation

## 2019-09-26 NOTE — PROGRESS NOTE BEHAVIORAL HEALTH - CASE SUMMARY
Pt. appears to be less intrusive. She is less pressured. She would like to start Lamictal, she reported she is not continuing lithium after DC>
Pt. is agreeable to start Risperdal and she is adherent to it. there was no concerns about her boundaries. She was educated about SE.
team met with pt. pt appears to be more redirectable, her main focus is to get her court date to be changed as she does not want to be arrested, she gave this MD a letter including request to change the court date, this DM explained to her that the letter will explain that she is hospitalized but this MD can't request  to change the date. She agreed with a letter stating she is hospitalized currently.
I agree with plan and note
Pt. was hyperverbal, intrusive, did not maintain boundaries. She denied any loss of interest. She continued to be grandiose. She agreed to cross titrate Seroquel with Risperdal.
Pt. had an argument with her uncle , she reported she feels upset about it but she is able to cope with it.
Pt is able to control her impulses, she is less pressured. her mood as been stable. She is excited about DC.
as noted

## 2019-09-26 NOTE — PROGRESS NOTE BEHAVIORAL HEALTH - NSBHLEGALSTATUS_PSY_A_CORE
9.39 (Emergency)

## 2019-09-26 NOTE — PROGRESS NOTE BEHAVIORAL HEALTH - PRIMARY DX
Bipolar 1 disorder, manic, moderate
Bipolar disorder, current episode manic severe with psychotic features
Bipolar 1 disorder, manic, moderate

## 2019-09-26 NOTE — DISCHARGE NOTE BEHAVIORAL HEALTH - FAMILY HISTORY OF PSYCHIATRIC ILLNESS
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

## 2019-09-26 NOTE — PROGRESS NOTE BEHAVIORAL HEALTH - AFFECT RANGE
Labile

## 2019-09-26 NOTE — PROGRESS NOTE BEHAVIORAL HEALTH - BEHAVIOR
Cooperative
Uncooperative
Cooperative
Hostile
Cooperative
Cooperative

## 2019-09-27 VITALS
TEMPERATURE: 99 F | SYSTOLIC BLOOD PRESSURE: 114 MMHG | HEART RATE: 115 BPM | RESPIRATION RATE: 16 BRPM | DIASTOLIC BLOOD PRESSURE: 92 MMHG

## 2019-09-27 PROCEDURE — 99231 SBSQ HOSP IP/OBS SF/LOW 25: CPT

## 2019-09-27 RX ADMIN — RISPERIDONE 3 MILLIGRAM(S): 4 TABLET ORAL at 08:17

## 2019-09-27 NOTE — PROGRESS NOTE BEHAVIORAL HEALTH - SUMMARY
Patient:   ARTURO CA            MRN: [id number]            FIN: [id number]              Age:   77 years     Sex:  FEMALE     :  37   Associated Diagnoses:   None   Author:   RICKY BARNES     Subjective   Additional information Pt doing well. Denies F/C, N/V overnight. Denies purulent drainage. Lance diet. +BM. Reports irritation from staples rubbing together at pannus. Placed abd to reduce irritation. .       Health Status   Current medications:          Objective   Intake and Output   I & O between:  2019 07:08 TO 2019 07:08  Med Dosing Weight:  78.8  kg   2019  24 Hour Intake:   480.00  ( 6.09 mL/kg )  24 Hour Output:   0.00           24 Hour Urine/Stool Output:   0.0  24 Hour Balance:   480.00           24 Hour Urine Output:   0.00  ( 0.00 mL/kg/hr )                   Urine Count:  7.00    Stool Count:  1         VS/Measurements     Vitals between:   2019 07:08:02   TO   2019 07:08:02                   LAST RESULT MINIMUM MAXIMUM  Temperature 37.2 36.1 37.2  Heart Rate 84 77 86  Respiratory Rate 16 16 16  NISBP           121 112 123  NIDBP           65 54 71  NIMBP           84 73 88  SpO2                    99 94 100    General:  Alert and oriented, No acute distress.    Respiratory:  Respirations are non-labored.    Gastrointestinal:  incision C/D/I, no induration or discharge. minimal erythema, tender to palpation at incision in distal 3 cm .   Neurologic:  Alert, Oriented.    Psychiatric:  Appropriate mood & affect.      Results Review   General results   Interpretation:   Labs between:  2019 07:08 to 2019 07:08  CBC:                 WBC  HgB  Hct  Plt  MCV  RDW   2019 10.7  (L) 9.9  (L) 31.5  329  97.2  14.8   2019 (H) 14.5  (L) 10.3  (L) 33.3  300  99.1  14.8   DIFF:                 Seg  Neutroph//ABS  Lymph//ABS  Mono//ABS  EOS/ABS  2019 NOT APPLICABLE  73 // (H) 8.0  8 // (L) 0.8  14 // (H) 1.5  3 // 0.3   25-JUN-2019 NOT APPLICABLE  78 // (H) 11.4  4 // (L) 0.6  14 // (H) 2.0  2 // 0.3  BMP:                 Na  Cl  BUN  Glu   26-JUN-2019 140  106  (H) 21  (H) 110                              K  CO2  Cr  Ca                              3.6  28  (H) 1.33  9.4                          Impression and Plan   Assessment and Plan:       Diagnosis:   A/P: 76 y/o w/ R adnexal mass and anterior abdominal mass found 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  -CT 6/23: Pelvic mass shows lower attenuation but new central gas.  This may be an infected hematoma.  It is causing new mild left hydroureteronephrosis.  Large right external iliac lymph node is stable.  -WBC downtrending 16.3>20.8>17.7>>16.6>14.5>10.7, infected hematoma less likely given decreasing white count with Keflex  -VSS  -urine culture positive for e.coli, continue Keflex   -will consider staple removal given irritation at pannus   Attending: Dr. Velasquez   Resident: Michelle, R3.  Staples removed and steri strips placed over incision. Will sign off at this time given that pt has been afebrile and WBC has been improving. Please contact the gyn onc team with any further questions.  Michelle,[id number]   35yo AW, domiciled, with h/o bipolar d/o, multiple IPP admissions including [facility], BIBA reportedly due to erratic inappropriate behavior at [facility] (as per EMR). Pt herself states that she came in voluntarily, "just walked here" (denies being BIBA) because "I want to get my medications, I could not find them". In ED, pt was observed behaving inappropriately as described in HPI. On exam, pt has increased speech latency, tangential t/p, poor attention span, she is selectively mute and appears to be acutely psychotic. I was unable to obtain much collaterals, but family thinks she is non-adherent to meds.    Upon reassessment, pt continues to be hyperverbal, tangential with elated mood and increased energy. Patient's decompensation is in the setting of poor medication adherence, poor outpatient followup, and recent psychosocial stressor (family traveling outside country and patient left home alone). At this time, patient continues to presents acutely manic and requires continued IPP admission for safety, stabilization, and medication management.     #Bipolar d/o   -continue Seroquel 200mg po QHS  -c/w trileptal 600mg po bid  -c/w trazadone 100mg po PRN QHS for insomnia    #severe agitation  -for severe agitation on amenable to verbal re-direction give haldol 5mg po PRN, atarax 50mg po PRN, ativan 2 mg po PRN, with escalation to IM if patient danger to self/other

## 2019-09-27 NOTE — CHART NOTE - NSCHARTNOTEFT_GEN_A_CORE
Pt. is scheduled for discharge on this date.  She appears to have greatly compensated since her admission.  Pt. denies any suicidal or homicidal ideation or any A/V hallucinations.  She will be returning to her private home.  On 9/30, pt. will begin PHP.  Pt. presents as stable for discharge on this date.

## 2019-09-30 DIAGNOSIS — F31.2 BIPOLAR DISORDER, CURRENT EPISODE MANIC SEVERE WITH PSYCHOTIC FEATURES: ICD-10-CM

## 2019-09-30 DIAGNOSIS — F17.210 NICOTINE DEPENDENCE, CIGARETTES, UNCOMPLICATED: ICD-10-CM

## 2019-09-30 DIAGNOSIS — Z23 ENCOUNTER FOR IMMUNIZATION: ICD-10-CM

## 2019-10-02 RX ORDER — RISPERIDONE 4 MG/1
25 TABLET ORAL ONCE
Refills: 0 | Status: DISCONTINUED | OUTPATIENT
Start: 2019-10-04 | End: 2020-02-05

## 2019-10-18 ENCOUNTER — OUTPATIENT (OUTPATIENT)
Dept: OUTPATIENT SERVICES | Facility: HOSPITAL | Age: 34
LOS: 1 days | Discharge: HOME | End: 2019-10-18

## 2019-10-18 DIAGNOSIS — F31.12 BIPOLAR DISORDER, CURRENT EPISODE MANIC WITHOUT PSYCHOTIC FEATURES, MODERATE: ICD-10-CM

## 2019-10-21 ENCOUNTER — OUTPATIENT (OUTPATIENT)
Dept: OUTPATIENT SERVICES | Facility: HOSPITAL | Age: 34
LOS: 1 days | Discharge: HOME | End: 2019-10-21
Payer: MEDICAID

## 2019-10-21 PROCEDURE — 90792 PSYCH DIAG EVAL W/MED SRVCS: CPT

## 2019-10-22 DIAGNOSIS — F30.12 MANIC EPISODE WITHOUT PSYCHOTIC SYMPTOMS, MODERATE: ICD-10-CM

## 2019-11-08 ENCOUNTER — OUTPATIENT (OUTPATIENT)
Dept: OUTPATIENT SERVICES | Facility: HOSPITAL | Age: 34
LOS: 1 days | Discharge: HOME | End: 2019-11-08

## 2019-11-08 DIAGNOSIS — F30.12 MANIC EPISODE WITHOUT PSYCHOTIC SYMPTOMS, MODERATE: ICD-10-CM

## 2019-11-12 ENCOUNTER — OUTPATIENT (OUTPATIENT)
Dept: OUTPATIENT SERVICES | Facility: HOSPITAL | Age: 34
LOS: 1 days | Discharge: HOME | End: 2019-11-12
Payer: MEDICAID

## 2019-11-12 DIAGNOSIS — F30.12 MANIC EPISODE WITHOUT PSYCHOTIC SYMPTOMS, MODERATE: ICD-10-CM

## 2019-11-12 PROCEDURE — 99214 OFFICE O/P EST MOD 30 MIN: CPT

## 2019-11-15 ENCOUNTER — OUTPATIENT (OUTPATIENT)
Dept: OUTPATIENT SERVICES | Facility: HOSPITAL | Age: 34
LOS: 1 days | Discharge: HOME | End: 2019-11-15

## 2019-11-15 DIAGNOSIS — F30.12 MANIC EPISODE WITHOUT PSYCHOTIC SYMPTOMS, MODERATE: ICD-10-CM

## 2019-11-26 ENCOUNTER — OUTPATIENT (OUTPATIENT)
Dept: OUTPATIENT SERVICES | Facility: HOSPITAL | Age: 34
LOS: 1 days | Discharge: HOME | End: 2019-11-26
Payer: MEDICAID

## 2019-11-26 DIAGNOSIS — F30.12 MANIC EPISODE WITHOUT PSYCHOTIC SYMPTOMS, MODERATE: ICD-10-CM

## 2019-11-26 PROCEDURE — 99214 OFFICE O/P EST MOD 30 MIN: CPT

## 2019-12-02 ENCOUNTER — OUTPATIENT (OUTPATIENT)
Dept: OUTPATIENT SERVICES | Facility: HOSPITAL | Age: 34
LOS: 1 days | Discharge: HOME | End: 2019-12-02

## 2019-12-02 DIAGNOSIS — F30.12 MANIC EPISODE WITHOUT PSYCHOTIC SYMPTOMS, MODERATE: ICD-10-CM

## 2019-12-04 ENCOUNTER — OUTPATIENT (OUTPATIENT)
Dept: OUTPATIENT SERVICES | Facility: HOSPITAL | Age: 34
LOS: 1 days | Discharge: HOME | End: 2019-12-04
Payer: MEDICAID

## 2019-12-04 DIAGNOSIS — F30.12 MANIC EPISODE WITHOUT PSYCHOTIC SYMPTOMS, MODERATE: ICD-10-CM

## 2019-12-04 PROCEDURE — 99214 OFFICE O/P EST MOD 30 MIN: CPT

## 2019-12-11 ENCOUNTER — OUTPATIENT (OUTPATIENT)
Dept: OUTPATIENT SERVICES | Facility: HOSPITAL | Age: 34
LOS: 1 days | Discharge: HOME | End: 2019-12-11

## 2019-12-11 DIAGNOSIS — F30.12 MANIC EPISODE WITHOUT PSYCHOTIC SYMPTOMS, MODERATE: ICD-10-CM

## 2019-12-20 ENCOUNTER — OUTPATIENT (OUTPATIENT)
Dept: OUTPATIENT SERVICES | Facility: HOSPITAL | Age: 34
LOS: 1 days | Discharge: HOME | End: 2019-12-20

## 2019-12-20 DIAGNOSIS — F30.12 MANIC EPISODE WITHOUT PSYCHOTIC SYMPTOMS, MODERATE: ICD-10-CM

## 2019-12-30 ENCOUNTER — OUTPATIENT (OUTPATIENT)
Dept: OUTPATIENT SERVICES | Facility: HOSPITAL | Age: 34
LOS: 1 days | Discharge: HOME | End: 2019-12-30
Payer: MEDICAID

## 2019-12-30 DIAGNOSIS — F30.12 MANIC EPISODE WITHOUT PSYCHOTIC SYMPTOMS, MODERATE: ICD-10-CM

## 2019-12-30 PROCEDURE — 99213 OFFICE O/P EST LOW 20 MIN: CPT

## 2020-01-02 ENCOUNTER — OUTPATIENT (OUTPATIENT)
Dept: OUTPATIENT SERVICES | Facility: HOSPITAL | Age: 35
LOS: 1 days | Discharge: HOME | End: 2020-01-02

## 2020-01-02 DIAGNOSIS — F30.12 MANIC EPISODE WITHOUT PSYCHOTIC SYMPTOMS, MODERATE: ICD-10-CM

## 2020-01-07 ENCOUNTER — OUTPATIENT (OUTPATIENT)
Dept: OUTPATIENT SERVICES | Facility: HOSPITAL | Age: 35
LOS: 1 days | Discharge: HOME | End: 2020-01-07

## 2020-01-07 DIAGNOSIS — F30.12 MANIC EPISODE WITHOUT PSYCHOTIC SYMPTOMS, MODERATE: ICD-10-CM

## 2020-01-14 ENCOUNTER — OUTPATIENT (OUTPATIENT)
Dept: OUTPATIENT SERVICES | Facility: HOSPITAL | Age: 35
LOS: 1 days | Discharge: HOME | End: 2020-01-14
Payer: MEDICAID

## 2020-01-14 DIAGNOSIS — F30.12 MANIC EPISODE WITHOUT PSYCHOTIC SYMPTOMS, MODERATE: ICD-10-CM

## 2020-01-14 PROCEDURE — 99214 OFFICE O/P EST MOD 30 MIN: CPT

## 2020-01-17 ENCOUNTER — OUTPATIENT (OUTPATIENT)
Dept: OUTPATIENT SERVICES | Facility: HOSPITAL | Age: 35
LOS: 1 days | Discharge: HOME | End: 2020-01-17

## 2020-01-17 DIAGNOSIS — F30.12 MANIC EPISODE WITHOUT PSYCHOTIC SYMPTOMS, MODERATE: ICD-10-CM

## 2020-01-23 ENCOUNTER — OUTPATIENT (OUTPATIENT)
Dept: OUTPATIENT SERVICES | Facility: HOSPITAL | Age: 35
LOS: 1 days | Discharge: HOME | End: 2020-01-23

## 2020-01-23 DIAGNOSIS — F30.12 MANIC EPISODE WITHOUT PSYCHOTIC SYMPTOMS, MODERATE: ICD-10-CM

## 2020-01-29 ENCOUNTER — OUTPATIENT (OUTPATIENT)
Dept: OUTPATIENT SERVICES | Facility: HOSPITAL | Age: 35
LOS: 1 days | Discharge: HOME | End: 2020-01-29
Payer: MEDICAID

## 2020-01-29 DIAGNOSIS — F30.12 MANIC EPISODE WITHOUT PSYCHOTIC SYMPTOMS, MODERATE: ICD-10-CM

## 2020-01-29 PROCEDURE — 99213 OFFICE O/P EST LOW 20 MIN: CPT

## 2020-02-07 ENCOUNTER — OUTPATIENT (OUTPATIENT)
Dept: OUTPATIENT SERVICES | Facility: HOSPITAL | Age: 35
LOS: 1 days | Discharge: HOME | End: 2020-02-07
Payer: MEDICAID

## 2020-02-07 DIAGNOSIS — F30.12 MANIC EPISODE WITHOUT PSYCHOTIC SYMPTOMS, MODERATE: ICD-10-CM

## 2020-02-07 PROCEDURE — 99213 OFFICE O/P EST LOW 20 MIN: CPT

## 2020-02-11 ENCOUNTER — OUTPATIENT (OUTPATIENT)
Dept: OUTPATIENT SERVICES | Facility: HOSPITAL | Age: 35
LOS: 1 days | Discharge: HOME | End: 2020-02-11

## 2020-02-11 DIAGNOSIS — F30.12 MANIC EPISODE WITHOUT PSYCHOTIC SYMPTOMS, MODERATE: ICD-10-CM

## 2020-02-14 ENCOUNTER — OUTPATIENT (OUTPATIENT)
Dept: OUTPATIENT SERVICES | Facility: HOSPITAL | Age: 35
LOS: 1 days | Discharge: HOME | End: 2020-02-14

## 2020-02-14 DIAGNOSIS — F30.12 MANIC EPISODE WITHOUT PSYCHOTIC SYMPTOMS, MODERATE: ICD-10-CM

## 2020-02-24 ENCOUNTER — OUTPATIENT (OUTPATIENT)
Dept: OUTPATIENT SERVICES | Facility: HOSPITAL | Age: 35
LOS: 1 days | Discharge: HOME | End: 2020-02-24
Payer: MEDICAID

## 2020-02-24 DIAGNOSIS — F30.12 MANIC EPISODE WITHOUT PSYCHOTIC SYMPTOMS, MODERATE: ICD-10-CM

## 2020-02-24 PROCEDURE — 99213 OFFICE O/P EST LOW 20 MIN: CPT

## 2020-02-26 ENCOUNTER — OUTPATIENT (OUTPATIENT)
Dept: OUTPATIENT SERVICES | Facility: HOSPITAL | Age: 35
LOS: 1 days | Discharge: HOME | End: 2020-02-26

## 2020-02-26 DIAGNOSIS — F30.12 MANIC EPISODE WITHOUT PSYCHOTIC SYMPTOMS, MODERATE: ICD-10-CM

## 2020-03-02 ENCOUNTER — OUTPATIENT (OUTPATIENT)
Dept: OUTPATIENT SERVICES | Facility: HOSPITAL | Age: 35
LOS: 1 days | Discharge: HOME | End: 2020-03-02

## 2020-03-02 DIAGNOSIS — F30.12 MANIC EPISODE WITHOUT PSYCHOTIC SYMPTOMS, MODERATE: ICD-10-CM

## 2020-03-12 ENCOUNTER — OUTPATIENT (OUTPATIENT)
Dept: OUTPATIENT SERVICES | Facility: HOSPITAL | Age: 35
LOS: 1 days | Discharge: HOME | End: 2020-03-12

## 2020-03-12 DIAGNOSIS — F30.12 MANIC EPISODE WITHOUT PSYCHOTIC SYMPTOMS, MODERATE: ICD-10-CM

## 2020-03-17 ENCOUNTER — OUTPATIENT (OUTPATIENT)
Dept: OUTPATIENT SERVICES | Facility: HOSPITAL | Age: 35
LOS: 1 days | Discharge: HOME | End: 2020-03-17

## 2020-03-17 DIAGNOSIS — F30.12 MANIC EPISODE WITHOUT PSYCHOTIC SYMPTOMS, MODERATE: ICD-10-CM

## 2020-03-23 ENCOUNTER — OUTPATIENT (OUTPATIENT)
Dept: OUTPATIENT SERVICES | Facility: HOSPITAL | Age: 35
LOS: 1 days | Discharge: HOME | End: 2020-03-23

## 2020-03-23 DIAGNOSIS — F30.12 MANIC EPISODE WITHOUT PSYCHOTIC SYMPTOMS, MODERATE: ICD-10-CM

## 2020-03-30 ENCOUNTER — OUTPATIENT (OUTPATIENT)
Dept: OUTPATIENT SERVICES | Facility: HOSPITAL | Age: 35
LOS: 1 days | Discharge: HOME | End: 2020-03-30

## 2020-03-31 DIAGNOSIS — F30.12 MANIC EPISODE WITHOUT PSYCHOTIC SYMPTOMS, MODERATE: ICD-10-CM

## 2020-04-06 ENCOUNTER — OUTPATIENT (OUTPATIENT)
Dept: OUTPATIENT SERVICES | Facility: HOSPITAL | Age: 35
LOS: 1 days | Discharge: HOME | End: 2020-04-06

## 2020-04-07 DIAGNOSIS — F30.12 MANIC EPISODE WITHOUT PSYCHOTIC SYMPTOMS, MODERATE: ICD-10-CM

## 2020-04-13 ENCOUNTER — OUTPATIENT (OUTPATIENT)
Dept: OUTPATIENT SERVICES | Facility: HOSPITAL | Age: 35
LOS: 1 days | Discharge: HOME | End: 2020-04-13

## 2020-04-13 DIAGNOSIS — F30.12 MANIC EPISODE WITHOUT PSYCHOTIC SYMPTOMS, MODERATE: ICD-10-CM

## 2020-04-20 ENCOUNTER — OUTPATIENT (OUTPATIENT)
Dept: OUTPATIENT SERVICES | Facility: HOSPITAL | Age: 35
LOS: 1 days | Discharge: HOME | End: 2020-04-20

## 2020-04-20 DIAGNOSIS — F30.12 MANIC EPISODE WITHOUT PSYCHOTIC SYMPTOMS, MODERATE: ICD-10-CM

## 2020-04-22 ENCOUNTER — OUTPATIENT (OUTPATIENT)
Dept: OUTPATIENT SERVICES | Facility: HOSPITAL | Age: 35
LOS: 1 days | Discharge: HOME | End: 2020-04-22

## 2020-04-22 DIAGNOSIS — F30.12 MANIC EPISODE WITHOUT PSYCHOTIC SYMPTOMS, MODERATE: ICD-10-CM

## 2020-04-27 ENCOUNTER — OUTPATIENT (OUTPATIENT)
Dept: OUTPATIENT SERVICES | Facility: HOSPITAL | Age: 35
LOS: 1 days | Discharge: HOME | End: 2020-04-27

## 2020-04-28 DIAGNOSIS — F30.12 MANIC EPISODE WITHOUT PSYCHOTIC SYMPTOMS, MODERATE: ICD-10-CM

## 2020-05-04 ENCOUNTER — OUTPATIENT (OUTPATIENT)
Dept: OUTPATIENT SERVICES | Facility: HOSPITAL | Age: 35
LOS: 1 days | Discharge: HOME | End: 2020-05-04

## 2020-05-04 DIAGNOSIS — F30.12 MANIC EPISODE WITHOUT PSYCHOTIC SYMPTOMS, MODERATE: ICD-10-CM

## 2020-05-11 ENCOUNTER — OUTPATIENT (OUTPATIENT)
Dept: OUTPATIENT SERVICES | Facility: HOSPITAL | Age: 35
LOS: 1 days | Discharge: HOME | End: 2020-05-11

## 2020-05-11 DIAGNOSIS — F30.12 MANIC EPISODE WITHOUT PSYCHOTIC SYMPTOMS, MODERATE: ICD-10-CM

## 2020-05-20 ENCOUNTER — OUTPATIENT (OUTPATIENT)
Dept: OUTPATIENT SERVICES | Facility: HOSPITAL | Age: 35
LOS: 1 days | Discharge: HOME | End: 2020-05-20
Payer: MEDICAID

## 2020-05-20 DIAGNOSIS — F30.12 MANIC EPISODE WITHOUT PSYCHOTIC SYMPTOMS, MODERATE: ICD-10-CM

## 2020-05-20 PROCEDURE — 99213 OFFICE O/P EST LOW 20 MIN: CPT

## 2020-05-27 ENCOUNTER — OUTPATIENT (OUTPATIENT)
Dept: OUTPATIENT SERVICES | Facility: HOSPITAL | Age: 35
LOS: 1 days | Discharge: HOME | End: 2020-05-27

## 2020-06-12 ENCOUNTER — OUTPATIENT (OUTPATIENT)
Dept: OUTPATIENT SERVICES | Facility: HOSPITAL | Age: 35
LOS: 1 days | Discharge: HOME | End: 2020-06-12

## 2020-06-12 DIAGNOSIS — F30.12 MANIC EPISODE WITHOUT PSYCHOTIC SYMPTOMS, MODERATE: ICD-10-CM

## 2020-06-17 ENCOUNTER — OUTPATIENT (OUTPATIENT)
Dept: OUTPATIENT SERVICES | Facility: HOSPITAL | Age: 35
LOS: 1 days | Discharge: HOME | End: 2020-06-17
Payer: MEDICAID

## 2020-06-17 DIAGNOSIS — F30.12 MANIC EPISODE WITHOUT PSYCHOTIC SYMPTOMS, MODERATE: ICD-10-CM

## 2020-06-17 PROCEDURE — 99213 OFFICE O/P EST LOW 20 MIN: CPT

## 2020-06-26 ENCOUNTER — OUTPATIENT (OUTPATIENT)
Dept: OUTPATIENT SERVICES | Facility: HOSPITAL | Age: 35
LOS: 1 days | Discharge: HOME | End: 2020-06-26

## 2020-06-26 DIAGNOSIS — F30.12 MANIC EPISODE WITHOUT PSYCHOTIC SYMPTOMS, MODERATE: ICD-10-CM

## 2020-07-03 NOTE — H&P ADULT - DOES THIS PATIENT HAVE A HISTORY OF OR HAS BEEN DX WITH HEART FAILURE?
Neurosurgery  Doing well,   Good motor strength    Drain still putting out quite a bit.   Will keep today with plan to let go tomorrow no

## 2020-07-10 ENCOUNTER — OUTPATIENT (OUTPATIENT)
Dept: OUTPATIENT SERVICES | Facility: HOSPITAL | Age: 35
LOS: 1 days | Discharge: HOME | End: 2020-07-10

## 2020-07-13 DIAGNOSIS — F30.12 MANIC EPISODE WITHOUT PSYCHOTIC SYMPTOMS, MODERATE: ICD-10-CM

## 2020-07-15 ENCOUNTER — OUTPATIENT (OUTPATIENT)
Dept: OUTPATIENT SERVICES | Facility: HOSPITAL | Age: 35
LOS: 1 days | Discharge: HOME | End: 2020-07-15
Payer: MEDICAID

## 2020-07-15 DIAGNOSIS — F30.12 MANIC EPISODE WITHOUT PSYCHOTIC SYMPTOMS, MODERATE: ICD-10-CM

## 2020-07-15 PROCEDURE — 99213 OFFICE O/P EST LOW 20 MIN: CPT

## 2020-07-24 ENCOUNTER — OUTPATIENT (OUTPATIENT)
Dept: OUTPATIENT SERVICES | Facility: HOSPITAL | Age: 35
LOS: 1 days | Discharge: HOME | End: 2020-07-24

## 2020-07-24 DIAGNOSIS — F30.12 MANIC EPISODE WITHOUT PSYCHOTIC SYMPTOMS, MODERATE: ICD-10-CM

## 2020-08-07 ENCOUNTER — OUTPATIENT (OUTPATIENT)
Dept: OUTPATIENT SERVICES | Facility: HOSPITAL | Age: 35
LOS: 1 days | Discharge: HOME | End: 2020-08-07

## 2020-08-07 DIAGNOSIS — F30.12 MANIC EPISODE WITHOUT PSYCHOTIC SYMPTOMS, MODERATE: ICD-10-CM

## 2020-08-12 ENCOUNTER — OUTPATIENT (OUTPATIENT)
Dept: OUTPATIENT SERVICES | Facility: HOSPITAL | Age: 35
LOS: 1 days | Discharge: HOME | End: 2020-08-12
Payer: MEDICAID

## 2020-08-12 DIAGNOSIS — F30.12 MANIC EPISODE WITHOUT PSYCHOTIC SYMPTOMS, MODERATE: ICD-10-CM

## 2020-08-12 PROCEDURE — 99213 OFFICE O/P EST LOW 20 MIN: CPT

## 2020-08-20 ENCOUNTER — OUTPATIENT (OUTPATIENT)
Dept: OUTPATIENT SERVICES | Facility: HOSPITAL | Age: 35
LOS: 1 days | Discharge: HOME | End: 2020-08-20

## 2020-08-20 DIAGNOSIS — F30.12 MANIC EPISODE WITHOUT PSYCHOTIC SYMPTOMS, MODERATE: ICD-10-CM

## 2020-09-03 ENCOUNTER — OUTPATIENT (OUTPATIENT)
Dept: OUTPATIENT SERVICES | Facility: HOSPITAL | Age: 35
LOS: 1 days | Discharge: HOME | End: 2020-09-03

## 2020-09-03 DIAGNOSIS — F30.12 MANIC EPISODE WITHOUT PSYCHOTIC SYMPTOMS, MODERATE: ICD-10-CM

## 2020-09-09 ENCOUNTER — OUTPATIENT (OUTPATIENT)
Dept: OUTPATIENT SERVICES | Facility: HOSPITAL | Age: 35
LOS: 1 days | Discharge: HOME | End: 2020-09-09

## 2020-09-09 DIAGNOSIS — F30.12 MANIC EPISODE WITHOUT PSYCHOTIC SYMPTOMS, MODERATE: ICD-10-CM

## 2020-09-17 ENCOUNTER — OUTPATIENT (OUTPATIENT)
Dept: OUTPATIENT SERVICES | Facility: HOSPITAL | Age: 35
LOS: 1 days | Discharge: HOME | End: 2020-09-17

## 2020-09-17 DIAGNOSIS — F30.12 MANIC EPISODE WITHOUT PSYCHOTIC SYMPTOMS, MODERATE: ICD-10-CM

## 2020-10-02 ENCOUNTER — OUTPATIENT (OUTPATIENT)
Dept: OUTPATIENT SERVICES | Facility: HOSPITAL | Age: 35
LOS: 1 days | Discharge: HOME | End: 2020-10-02

## 2020-10-02 DIAGNOSIS — F30.12 MANIC EPISODE WITHOUT PSYCHOTIC SYMPTOMS, MODERATE: ICD-10-CM

## 2020-10-07 ENCOUNTER — OUTPATIENT (OUTPATIENT)
Dept: OUTPATIENT SERVICES | Facility: HOSPITAL | Age: 35
LOS: 1 days | Discharge: HOME | End: 2020-10-07

## 2020-10-07 DIAGNOSIS — F30.12 MANIC EPISODE WITHOUT PSYCHOTIC SYMPTOMS, MODERATE: ICD-10-CM

## 2020-10-15 ENCOUNTER — OUTPATIENT (OUTPATIENT)
Dept: OUTPATIENT SERVICES | Facility: HOSPITAL | Age: 35
LOS: 1 days | Discharge: HOME | End: 2020-10-15

## 2020-10-15 DIAGNOSIS — F30.12 MANIC EPISODE WITHOUT PSYCHOTIC SYMPTOMS, MODERATE: ICD-10-CM

## 2020-10-29 ENCOUNTER — OUTPATIENT (OUTPATIENT)
Dept: OUTPATIENT SERVICES | Facility: HOSPITAL | Age: 35
LOS: 1 days | Discharge: HOME | End: 2020-10-29

## 2020-10-29 DIAGNOSIS — F30.12 MANIC EPISODE WITHOUT PSYCHOTIC SYMPTOMS, MODERATE: ICD-10-CM

## 2020-11-02 ENCOUNTER — OUTPATIENT (OUTPATIENT)
Dept: OUTPATIENT SERVICES | Facility: HOSPITAL | Age: 35
LOS: 1 days | Discharge: HOME | End: 2020-11-02

## 2020-11-02 DIAGNOSIS — F30.12 MANIC EPISODE WITHOUT PSYCHOTIC SYMPTOMS, MODERATE: ICD-10-CM

## 2020-11-03 ENCOUNTER — OUTPATIENT (OUTPATIENT)
Dept: OUTPATIENT SERVICES | Facility: HOSPITAL | Age: 35
LOS: 1 days | Discharge: HOME | End: 2020-11-03

## 2020-11-03 DIAGNOSIS — F30.12 MANIC EPISODE WITHOUT PSYCHOTIC SYMPTOMS, MODERATE: ICD-10-CM

## 2020-11-04 ENCOUNTER — APPOINTMENT (OUTPATIENT)
Dept: PSYCHIATRY | Facility: CLINIC | Age: 35
End: 2020-11-04

## 2020-11-12 ENCOUNTER — OUTPATIENT (OUTPATIENT)
Dept: OUTPATIENT SERVICES | Facility: HOSPITAL | Age: 35
LOS: 1 days | Discharge: HOME | End: 2020-11-12

## 2020-11-12 DIAGNOSIS — F30.12 MANIC EPISODE WITHOUT PSYCHOTIC SYMPTOMS, MODERATE: ICD-10-CM

## 2020-11-30 ENCOUNTER — APPOINTMENT (OUTPATIENT)
Dept: PSYCHIATRY | Facility: CLINIC | Age: 35
End: 2020-11-30
Payer: MEDICAID

## 2020-11-30 ENCOUNTER — OUTPATIENT (OUTPATIENT)
Dept: OUTPATIENT SERVICES | Facility: HOSPITAL | Age: 35
LOS: 1 days | Discharge: HOME | End: 2020-11-30

## 2020-11-30 DIAGNOSIS — F30.12 MANIC EPISODE WITHOUT PSYCHOTIC SYMPTOMS, MODERATE: ICD-10-CM

## 2020-11-30 PROCEDURE — XXXXX: CPT

## 2020-12-01 ENCOUNTER — APPOINTMENT (OUTPATIENT)
Dept: PSYCHIATRY | Facility: CLINIC | Age: 35
End: 2020-12-01

## 2020-12-01 ENCOUNTER — OUTPATIENT (OUTPATIENT)
Dept: OUTPATIENT SERVICES | Facility: HOSPITAL | Age: 35
LOS: 1 days | Discharge: HOME | End: 2020-12-01

## 2020-12-01 DIAGNOSIS — F30.12 MANIC EPISODE WITHOUT PSYCHOTIC SYMPTOMS, MODERATE: ICD-10-CM

## 2020-12-02 ENCOUNTER — APPOINTMENT (OUTPATIENT)
Dept: PSYCHIATRY | Facility: CLINIC | Age: 35
End: 2020-12-02

## 2020-12-03 ENCOUNTER — OUTPATIENT (OUTPATIENT)
Dept: OUTPATIENT SERVICES | Facility: HOSPITAL | Age: 35
LOS: 1 days | Discharge: HOME | End: 2020-12-03

## 2020-12-03 ENCOUNTER — APPOINTMENT (OUTPATIENT)
Dept: PSYCHIATRY | Facility: CLINIC | Age: 35
End: 2020-12-03

## 2020-12-03 DIAGNOSIS — F30.12 MANIC EPISODE WITHOUT PSYCHOTIC SYMPTOMS, MODERATE: ICD-10-CM

## 2020-12-11 ENCOUNTER — NON-APPOINTMENT (OUTPATIENT)
Age: 35
End: 2020-12-11

## 2020-12-11 DIAGNOSIS — Z80.1 FAMILY HISTORY OF MALIGNANT NEOPLASM OF TRACHEA, BRONCHUS AND LUNG: ICD-10-CM

## 2020-12-11 DIAGNOSIS — Z81.8 FAMILY HISTORY OF OTHER MENTAL AND BEHAVIORAL DISORDERS: ICD-10-CM

## 2020-12-11 DIAGNOSIS — Z83.3 FAMILY HISTORY OF DIABETES MELLITUS: ICD-10-CM

## 2020-12-11 DIAGNOSIS — Z78.9 OTHER SPECIFIED HEALTH STATUS: ICD-10-CM

## 2020-12-17 ENCOUNTER — OUTPATIENT (OUTPATIENT)
Dept: OUTPATIENT SERVICES | Facility: HOSPITAL | Age: 35
LOS: 1 days | Discharge: HOME | End: 2020-12-17

## 2020-12-17 ENCOUNTER — APPOINTMENT (OUTPATIENT)
Dept: PSYCHIATRY | Facility: CLINIC | Age: 35
End: 2020-12-17

## 2020-12-17 DIAGNOSIS — F30.12 MANIC EPISODE WITHOUT PSYCHOTIC SYMPTOMS, MODERATE: ICD-10-CM

## 2020-12-21 NOTE — PROGRESS NOTE BEHAVIORAL HEALTH - ORIENTED TO PLACE
normal sinus rhythm, Normal axis, Normal MO interval and QRS complex. There are no acute ischemic ST or T-wave changes.
Yes

## 2020-12-28 ENCOUNTER — APPOINTMENT (OUTPATIENT)
Dept: PSYCHIATRY | Facility: CLINIC | Age: 35
End: 2020-12-28

## 2020-12-29 ENCOUNTER — APPOINTMENT (OUTPATIENT)
Dept: PSYCHIATRY | Facility: CLINIC | Age: 35
End: 2020-12-29

## 2020-12-29 ENCOUNTER — OUTPATIENT (OUTPATIENT)
Dept: OUTPATIENT SERVICES | Facility: HOSPITAL | Age: 35
LOS: 1 days | Discharge: HOME | End: 2020-12-29

## 2020-12-29 VITALS
RESPIRATION RATE: 16 BRPM | HEIGHT: 65 IN | BODY MASS INDEX: 27.74 KG/M2 | SYSTOLIC BLOOD PRESSURE: 94 MMHG | TEMPERATURE: 98.2 F | DIASTOLIC BLOOD PRESSURE: 70 MMHG | WEIGHT: 166.5 LBS | HEART RATE: 74 BPM

## 2020-12-29 VITALS
HEIGHT: 65 IN | SYSTOLIC BLOOD PRESSURE: 94 MMHG | TEMPERATURE: 98.2 F | DIASTOLIC BLOOD PRESSURE: 70 MMHG | BODY MASS INDEX: 27.74 KG/M2 | WEIGHT: 166.5 LBS | HEART RATE: 74 BPM | RESPIRATION RATE: 16 BRPM

## 2020-12-29 DIAGNOSIS — F30.12 MANIC EPISODE WITHOUT PSYCHOTIC SYMPTOMS, MODERATE: ICD-10-CM

## 2020-12-29 RX ORDER — PALIPERIDONE PALMITATE 156 MG/ML
156 INJECTION INTRAMUSCULAR
Qty: 0 | Refills: 0 | Status: COMPLETED | OUTPATIENT
Start: 2020-12-28

## 2020-12-30 ENCOUNTER — APPOINTMENT (OUTPATIENT)
Dept: PSYCHIATRY | Facility: CLINIC | Age: 35
End: 2020-12-30

## 2020-12-31 ENCOUNTER — OUTPATIENT (OUTPATIENT)
Dept: OUTPATIENT SERVICES | Facility: HOSPITAL | Age: 35
LOS: 1 days | Discharge: HOME | End: 2020-12-31

## 2020-12-31 ENCOUNTER — APPOINTMENT (OUTPATIENT)
Dept: PSYCHIATRY | Facility: CLINIC | Age: 35
End: 2020-12-31

## 2020-12-31 DIAGNOSIS — F30.12 MANIC EPISODE WITHOUT PSYCHOTIC SYMPTOMS, MODERATE: ICD-10-CM

## 2021-01-04 ENCOUNTER — APPOINTMENT (OUTPATIENT)
Dept: PSYCHIATRY | Facility: CLINIC | Age: 36
End: 2021-01-04

## 2021-01-11 ENCOUNTER — APPOINTMENT (OUTPATIENT)
Dept: PSYCHIATRY | Facility: CLINIC | Age: 36
End: 2021-01-11

## 2021-01-11 ENCOUNTER — OUTPATIENT (OUTPATIENT)
Dept: OUTPATIENT SERVICES | Facility: HOSPITAL | Age: 36
LOS: 1 days | Discharge: HOME | End: 2021-01-11

## 2021-01-11 DIAGNOSIS — F30.12 MANIC EPISODE WITHOUT PSYCHOTIC SYMPTOMS, MODERATE: ICD-10-CM

## 2021-01-25 ENCOUNTER — OUTPATIENT (OUTPATIENT)
Dept: OUTPATIENT SERVICES | Facility: HOSPITAL | Age: 36
LOS: 1 days | Discharge: HOME | End: 2021-01-25

## 2021-01-25 ENCOUNTER — APPOINTMENT (OUTPATIENT)
Dept: PSYCHIATRY | Facility: CLINIC | Age: 36
End: 2021-01-25
Payer: MEDICAID

## 2021-01-25 DIAGNOSIS — F30.12 MANIC EPISODE WITHOUT PSYCHOTIC SYMPTOMS, MODERATE: ICD-10-CM

## 2021-01-25 PROCEDURE — 99213 OFFICE O/P EST LOW 20 MIN: CPT | Mod: 95

## 2021-01-25 RX ADMIN — PALIPERIDONE PALMITATE 0 MG/ML: 156 INJECTION INTRAMUSCULAR at 00:00

## 2021-01-26 ENCOUNTER — OUTPATIENT (OUTPATIENT)
Dept: OUTPATIENT SERVICES | Facility: HOSPITAL | Age: 36
LOS: 1 days | Discharge: HOME | End: 2021-01-26

## 2021-01-26 ENCOUNTER — APPOINTMENT (OUTPATIENT)
Dept: PSYCHIATRY | Facility: CLINIC | Age: 36
End: 2021-01-26

## 2021-01-26 VITALS
BODY MASS INDEX: 28.32 KG/M2 | RESPIRATION RATE: 16 BRPM | HEART RATE: 88 BPM | DIASTOLIC BLOOD PRESSURE: 74 MMHG | HEIGHT: 65 IN | TEMPERATURE: 98.1 F | WEIGHT: 170 LBS | SYSTOLIC BLOOD PRESSURE: 122 MMHG

## 2021-01-26 DIAGNOSIS — F30.12 MANIC EPISODE WITHOUT PSYCHOTIC SYMPTOMS, MODERATE: ICD-10-CM

## 2021-01-26 RX ORDER — PALIPERIDONE PALMITATE 156 MG/ML
156 INJECTION INTRAMUSCULAR
Qty: 0 | Refills: 0 | Status: COMPLETED | OUTPATIENT
Start: 2021-01-25

## 2021-02-08 ENCOUNTER — OUTPATIENT (OUTPATIENT)
Dept: OUTPATIENT SERVICES | Facility: HOSPITAL | Age: 36
LOS: 1 days | Discharge: HOME | End: 2021-02-08

## 2021-02-08 ENCOUNTER — APPOINTMENT (OUTPATIENT)
Dept: PSYCHIATRY | Facility: CLINIC | Age: 36
End: 2021-02-08

## 2021-02-08 DIAGNOSIS — F30.12 MANIC EPISODE WITHOUT PSYCHOTIC SYMPTOMS, MODERATE: ICD-10-CM

## 2021-02-22 ENCOUNTER — APPOINTMENT (OUTPATIENT)
Dept: PSYCHIATRY | Facility: CLINIC | Age: 36
End: 2021-02-22
Payer: MEDICAID

## 2021-02-22 ENCOUNTER — APPOINTMENT (OUTPATIENT)
Dept: PSYCHIATRY | Facility: CLINIC | Age: 36
End: 2021-02-22

## 2021-02-22 ENCOUNTER — OUTPATIENT (OUTPATIENT)
Dept: OUTPATIENT SERVICES | Facility: HOSPITAL | Age: 36
LOS: 1 days | Discharge: HOME | End: 2021-02-22

## 2021-02-22 DIAGNOSIS — F30.12 MANIC EPISODE WITHOUT PSYCHOTIC SYMPTOMS, MODERATE: ICD-10-CM

## 2021-02-22 PROCEDURE — 99213 OFFICE O/P EST LOW 20 MIN: CPT | Mod: 95

## 2021-02-22 RX ADMIN — PALIPERIDONE PALMITATE 0 MG/ML: 156 INJECTION INTRAMUSCULAR at 00:00

## 2021-02-23 ENCOUNTER — APPOINTMENT (OUTPATIENT)
Dept: PSYCHIATRY | Facility: CLINIC | Age: 36
End: 2021-02-23

## 2021-02-23 ENCOUNTER — OUTPATIENT (OUTPATIENT)
Dept: OUTPATIENT SERVICES | Facility: HOSPITAL | Age: 36
LOS: 1 days | Discharge: HOME | End: 2021-02-23

## 2021-02-23 VITALS
BODY MASS INDEX: 27.32 KG/M2 | SYSTOLIC BLOOD PRESSURE: 115 MMHG | HEART RATE: 84 BPM | RESPIRATION RATE: 16 BRPM | WEIGHT: 164 LBS | DIASTOLIC BLOOD PRESSURE: 87 MMHG | HEIGHT: 65 IN

## 2021-02-23 DIAGNOSIS — F30.12 MANIC EPISODE WITHOUT PSYCHOTIC SYMPTOMS, MODERATE: ICD-10-CM

## 2021-02-23 RX ORDER — PALIPERIDONE PALMITATE 156 MG/ML
156 INJECTION INTRAMUSCULAR
Qty: 0 | Refills: 0 | Status: COMPLETED | OUTPATIENT
Start: 2021-02-22

## 2021-03-08 ENCOUNTER — OUTPATIENT (OUTPATIENT)
Dept: OUTPATIENT SERVICES | Facility: HOSPITAL | Age: 36
LOS: 1 days | Discharge: HOME | End: 2021-03-08

## 2021-03-08 ENCOUNTER — APPOINTMENT (OUTPATIENT)
Dept: PSYCHIATRY | Facility: CLINIC | Age: 36
End: 2021-03-08

## 2021-03-08 DIAGNOSIS — F30.12 MANIC EPISODE WITHOUT PSYCHOTIC SYMPTOMS, MODERATE: ICD-10-CM

## 2021-03-18 ENCOUNTER — NON-APPOINTMENT (OUTPATIENT)
Age: 36
End: 2021-03-18

## 2021-03-19 ENCOUNTER — OUTPATIENT (OUTPATIENT)
Dept: OUTPATIENT SERVICES | Facility: HOSPITAL | Age: 36
LOS: 1 days | Discharge: HOME | End: 2021-03-19

## 2021-03-19 ENCOUNTER — APPOINTMENT (OUTPATIENT)
Dept: PSYCHIATRY | Facility: CLINIC | Age: 36
End: 2021-03-19
Payer: MEDICAID

## 2021-03-19 DIAGNOSIS — F30.12 MANIC EPISODE WITHOUT PSYCHOTIC SYMPTOMS, MODERATE: ICD-10-CM

## 2021-03-19 PROCEDURE — 99213 OFFICE O/P EST LOW 20 MIN: CPT | Mod: 95

## 2021-03-22 ENCOUNTER — OUTPATIENT (OUTPATIENT)
Dept: OUTPATIENT SERVICES | Facility: HOSPITAL | Age: 36
LOS: 1 days | Discharge: HOME | End: 2021-03-22

## 2021-03-22 ENCOUNTER — APPOINTMENT (OUTPATIENT)
Dept: PSYCHIATRY | Facility: CLINIC | Age: 36
End: 2021-03-22

## 2021-03-22 DIAGNOSIS — F30.12 MANIC EPISODE WITHOUT PSYCHOTIC SYMPTOMS, MODERATE: ICD-10-CM

## 2021-03-22 RX ADMIN — PALIPERIDONE PALMITATE 0 MG/ML: 156 INJECTION INTRAMUSCULAR at 00:00

## 2021-03-23 ENCOUNTER — OUTPATIENT (OUTPATIENT)
Dept: OUTPATIENT SERVICES | Facility: HOSPITAL | Age: 36
LOS: 1 days | Discharge: HOME | End: 2021-03-23

## 2021-03-23 ENCOUNTER — APPOINTMENT (OUTPATIENT)
Dept: PSYCHIATRY | Facility: CLINIC | Age: 36
End: 2021-03-23

## 2021-03-23 VITALS
HEART RATE: 84 BPM | BODY MASS INDEX: 28.62 KG/M2 | DIASTOLIC BLOOD PRESSURE: 70 MMHG | SYSTOLIC BLOOD PRESSURE: 118 MMHG | WEIGHT: 172 LBS

## 2021-03-23 DIAGNOSIS — F30.12 MANIC EPISODE WITHOUT PSYCHOTIC SYMPTOMS, MODERATE: ICD-10-CM

## 2021-03-23 RX ORDER — PALIPERIDONE PALMITATE 156 MG/ML
156 INJECTION INTRAMUSCULAR
Qty: 0 | Refills: 0 | Status: COMPLETED | OUTPATIENT
Start: 2021-03-22

## 2021-04-09 ENCOUNTER — OUTPATIENT (OUTPATIENT)
Dept: OUTPATIENT SERVICES | Facility: HOSPITAL | Age: 36
LOS: 1 days | Discharge: HOME | End: 2021-04-09

## 2021-04-09 ENCOUNTER — APPOINTMENT (OUTPATIENT)
Dept: PSYCHIATRY | Facility: CLINIC | Age: 36
End: 2021-04-09

## 2021-04-09 DIAGNOSIS — F30.12 MANIC EPISODE WITHOUT PSYCHOTIC SYMPTOMS, MODERATE: ICD-10-CM

## 2021-04-16 ENCOUNTER — OUTPATIENT (OUTPATIENT)
Dept: OUTPATIENT SERVICES | Facility: HOSPITAL | Age: 36
LOS: 1 days | Discharge: HOME | End: 2021-04-16

## 2021-04-16 ENCOUNTER — APPOINTMENT (OUTPATIENT)
Dept: PSYCHIATRY | Facility: CLINIC | Age: 36
End: 2021-04-16
Payer: MEDICAID

## 2021-04-16 DIAGNOSIS — F30.12 MANIC EPISODE WITHOUT PSYCHOTIC SYMPTOMS, MODERATE: ICD-10-CM

## 2021-04-16 PROCEDURE — 99213 OFFICE O/P EST LOW 20 MIN: CPT | Mod: 95

## 2021-04-20 ENCOUNTER — OUTPATIENT (OUTPATIENT)
Dept: OUTPATIENT SERVICES | Facility: HOSPITAL | Age: 36
LOS: 1 days | Discharge: HOME | End: 2021-04-20

## 2021-04-20 ENCOUNTER — APPOINTMENT (OUTPATIENT)
Dept: PSYCHIATRY | Facility: CLINIC | Age: 36
End: 2021-04-20

## 2021-04-20 VITALS
SYSTOLIC BLOOD PRESSURE: 118 MMHG | RESPIRATION RATE: 6 BRPM | DIASTOLIC BLOOD PRESSURE: 84 MMHG | HEIGHT: 65 IN | BODY MASS INDEX: 28.49 KG/M2 | WEIGHT: 171 LBS | HEART RATE: 80 BPM

## 2021-04-20 DIAGNOSIS — F30.12 MANIC EPISODE WITHOUT PSYCHOTIC SYMPTOMS, MODERATE: ICD-10-CM

## 2021-04-20 RX ORDER — PALIPERIDONE PALMITATE 156 MG/ML
156 INJECTION INTRAMUSCULAR
Qty: 0 | Refills: 0 | Status: COMPLETED | OUTPATIENT
Start: 2021-04-20

## 2021-04-21 ENCOUNTER — NON-APPOINTMENT (OUTPATIENT)
Age: 36
End: 2021-04-21

## 2021-04-26 ENCOUNTER — APPOINTMENT (OUTPATIENT)
Dept: PSYCHIATRY | Facility: CLINIC | Age: 36
End: 2021-04-26

## 2021-04-26 ENCOUNTER — OUTPATIENT (OUTPATIENT)
Dept: OUTPATIENT SERVICES | Facility: HOSPITAL | Age: 36
LOS: 1 days | Discharge: HOME | End: 2021-04-26

## 2021-04-26 DIAGNOSIS — F30.12 MANIC EPISODE WITHOUT PSYCHOTIC SYMPTOMS, MODERATE: ICD-10-CM

## 2021-05-14 ENCOUNTER — APPOINTMENT (OUTPATIENT)
Dept: PSYCHIATRY | Facility: CLINIC | Age: 36
End: 2021-05-14
Payer: MEDICAID

## 2021-05-14 ENCOUNTER — OUTPATIENT (OUTPATIENT)
Dept: OUTPATIENT SERVICES | Facility: HOSPITAL | Age: 36
LOS: 1 days | Discharge: HOME | End: 2021-05-14

## 2021-05-14 DIAGNOSIS — F30.12 MANIC EPISODE WITHOUT PSYCHOTIC SYMPTOMS, MODERATE: ICD-10-CM

## 2021-05-14 PROCEDURE — 99213 OFFICE O/P EST LOW 20 MIN: CPT | Mod: 95

## 2021-05-17 ENCOUNTER — APPOINTMENT (OUTPATIENT)
Dept: PSYCHIATRY | Facility: CLINIC | Age: 36
End: 2021-05-17

## 2021-05-17 ENCOUNTER — OUTPATIENT (OUTPATIENT)
Dept: OUTPATIENT SERVICES | Facility: HOSPITAL | Age: 36
LOS: 1 days | Discharge: HOME | End: 2021-05-17

## 2021-05-17 DIAGNOSIS — F30.12 MANIC EPISODE WITHOUT PSYCHOTIC SYMPTOMS, MODERATE: ICD-10-CM

## 2021-05-19 ENCOUNTER — NON-APPOINTMENT (OUTPATIENT)
Age: 36
End: 2021-05-19

## 2021-05-20 ENCOUNTER — APPOINTMENT (OUTPATIENT)
Dept: PSYCHIATRY | Facility: CLINIC | Age: 36
End: 2021-05-20

## 2021-05-20 ENCOUNTER — OUTPATIENT (OUTPATIENT)
Dept: OUTPATIENT SERVICES | Facility: HOSPITAL | Age: 36
LOS: 1 days | Discharge: HOME | End: 2021-05-20

## 2021-05-20 VITALS
DIASTOLIC BLOOD PRESSURE: 84 MMHG | WEIGHT: 170 LBS | HEART RATE: 64 BPM | BODY MASS INDEX: 28.32 KG/M2 | SYSTOLIC BLOOD PRESSURE: 118 MMHG | RESPIRATION RATE: 16 BRPM | HEIGHT: 65 IN

## 2021-05-20 DIAGNOSIS — F30.12 MANIC EPISODE WITHOUT PSYCHOTIC SYMPTOMS, MODERATE: ICD-10-CM

## 2021-05-20 RX ORDER — PALIPERIDONE PALMITATE 117 MG/.75ML
117 INJECTION INTRAMUSCULAR
Qty: 0 | Refills: 0 | Status: COMPLETED | OUTPATIENT
Start: 2021-05-20

## 2021-05-20 RX ADMIN — PALIPERIDONE PALMITATE 0 MG/0.75ML: 117 INJECTION INTRAMUSCULAR at 00:00

## 2021-06-07 ENCOUNTER — APPOINTMENT (OUTPATIENT)
Dept: PSYCHIATRY | Facility: CLINIC | Age: 36
End: 2021-06-07

## 2021-06-07 ENCOUNTER — OUTPATIENT (OUTPATIENT)
Dept: OUTPATIENT SERVICES | Facility: HOSPITAL | Age: 36
LOS: 1 days | Discharge: HOME | End: 2021-06-07

## 2021-06-07 DIAGNOSIS — F30.12 MANIC EPISODE WITHOUT PSYCHOTIC SYMPTOMS, MODERATE: ICD-10-CM

## 2021-06-14 ENCOUNTER — OUTPATIENT (OUTPATIENT)
Dept: OUTPATIENT SERVICES | Facility: HOSPITAL | Age: 36
LOS: 1 days | Discharge: HOME | End: 2021-06-14

## 2021-06-14 ENCOUNTER — APPOINTMENT (OUTPATIENT)
Dept: PSYCHIATRY | Facility: CLINIC | Age: 36
End: 2021-06-14
Payer: MEDICAID

## 2021-06-14 DIAGNOSIS — F30.12 MANIC EPISODE WITHOUT PSYCHOTIC SYMPTOMS, MODERATE: ICD-10-CM

## 2021-06-14 PROCEDURE — 99213 OFFICE O/P EST LOW 20 MIN: CPT | Mod: 95

## 2021-06-16 ENCOUNTER — NON-APPOINTMENT (OUTPATIENT)
Age: 36
End: 2021-06-16

## 2021-06-16 RX ADMIN — PALIPERIDONE PALMITATE 0 MG/0.75ML: 117 INJECTION INTRAMUSCULAR at 00:00

## 2021-06-17 ENCOUNTER — OUTPATIENT (OUTPATIENT)
Dept: OUTPATIENT SERVICES | Facility: HOSPITAL | Age: 36
LOS: 1 days | Discharge: HOME | End: 2021-06-17

## 2021-06-17 ENCOUNTER — APPOINTMENT (OUTPATIENT)
Dept: PSYCHIATRY | Facility: CLINIC | Age: 36
End: 2021-06-17

## 2021-06-17 VITALS — DIASTOLIC BLOOD PRESSURE: 88 MMHG | SYSTOLIC BLOOD PRESSURE: 128 MMHG | RESPIRATION RATE: 16 BRPM | HEART RATE: 64 BPM

## 2021-06-17 DIAGNOSIS — F30.12 MANIC EPISODE WITHOUT PSYCHOTIC SYMPTOMS, MODERATE: ICD-10-CM

## 2021-06-17 RX ORDER — PALIPERIDONE PALMITATE 117 MG/.75ML
117 INJECTION INTRAMUSCULAR
Qty: 0 | Refills: 0 | Status: COMPLETED | OUTPATIENT
Start: 2021-06-16

## 2021-06-21 ENCOUNTER — APPOINTMENT (OUTPATIENT)
Dept: PSYCHIATRY | Facility: CLINIC | Age: 36
End: 2021-06-21

## 2021-06-21 ENCOUNTER — OUTPATIENT (OUTPATIENT)
Dept: OUTPATIENT SERVICES | Facility: HOSPITAL | Age: 36
LOS: 1 days | Discharge: HOME | End: 2021-06-21

## 2021-06-21 DIAGNOSIS — F30.12 MANIC EPISODE WITHOUT PSYCHOTIC SYMPTOMS, MODERATE: ICD-10-CM

## 2021-07-09 ENCOUNTER — APPOINTMENT (OUTPATIENT)
Dept: PSYCHIATRY | Facility: CLINIC | Age: 36
End: 2021-07-09
Payer: MEDICAID

## 2021-07-09 ENCOUNTER — OUTPATIENT (OUTPATIENT)
Dept: OUTPATIENT SERVICES | Facility: HOSPITAL | Age: 36
LOS: 1 days | Discharge: HOME | End: 2021-07-09

## 2021-07-09 DIAGNOSIS — F30.12 MANIC EPISODE WITHOUT PSYCHOTIC SYMPTOMS, MODERATE: ICD-10-CM

## 2021-07-09 PROCEDURE — 99213 OFFICE O/P EST LOW 20 MIN: CPT | Mod: 95

## 2021-07-12 RX ORDER — RISPERIDONE 1 MG/1
1 TABLET ORAL
Refills: 0 | Status: DISCONTINUED | COMMUNITY
End: 2021-07-12

## 2021-07-12 RX ORDER — BUPROPION HYDROCHLORIDE 75 MG/1
75 TABLET, FILM COATED ORAL DAILY
Refills: 0 | Status: DISCONTINUED | COMMUNITY
End: 2021-07-12

## 2021-07-14 ENCOUNTER — NON-APPOINTMENT (OUTPATIENT)
Age: 36
End: 2021-07-14

## 2021-07-15 ENCOUNTER — APPOINTMENT (OUTPATIENT)
Dept: PSYCHIATRY | Facility: CLINIC | Age: 36
End: 2021-07-15

## 2021-07-15 ENCOUNTER — OUTPATIENT (OUTPATIENT)
Dept: OUTPATIENT SERVICES | Facility: HOSPITAL | Age: 36
LOS: 1 days | Discharge: HOME | End: 2021-07-15

## 2021-07-15 VITALS
BODY MASS INDEX: 28.82 KG/M2 | SYSTOLIC BLOOD PRESSURE: 122 MMHG | RESPIRATION RATE: 16 BRPM | WEIGHT: 173 LBS | HEART RATE: 84 BPM | DIASTOLIC BLOOD PRESSURE: 84 MMHG | HEIGHT: 65 IN

## 2021-07-15 DIAGNOSIS — F30.12 MANIC EPISODE WITHOUT PSYCHOTIC SYMPTOMS, MODERATE: ICD-10-CM

## 2021-07-16 ENCOUNTER — TRANSCRIPTION ENCOUNTER (OUTPATIENT)
Age: 36
End: 2021-07-16

## 2021-07-19 ENCOUNTER — OUTPATIENT (OUTPATIENT)
Dept: OUTPATIENT SERVICES | Facility: HOSPITAL | Age: 36
LOS: 1 days | Discharge: HOME | End: 2021-07-19

## 2021-07-19 ENCOUNTER — APPOINTMENT (OUTPATIENT)
Dept: PSYCHIATRY | Facility: CLINIC | Age: 36
End: 2021-07-19

## 2021-07-19 DIAGNOSIS — F30.12 MANIC EPISODE WITHOUT PSYCHOTIC SYMPTOMS, MODERATE: ICD-10-CM

## 2021-08-06 ENCOUNTER — OUTPATIENT (OUTPATIENT)
Dept: OUTPATIENT SERVICES | Facility: HOSPITAL | Age: 36
LOS: 1 days | Discharge: HOME | End: 2021-08-06

## 2021-08-06 ENCOUNTER — APPOINTMENT (OUTPATIENT)
Dept: PSYCHIATRY | Facility: CLINIC | Age: 36
End: 2021-08-06
Payer: MEDICAID

## 2021-08-06 DIAGNOSIS — F30.12 MANIC EPISODE WITHOUT PSYCHOTIC SYMPTOMS, MODERATE: ICD-10-CM

## 2021-08-06 PROCEDURE — 99213 OFFICE O/P EST LOW 20 MIN: CPT | Mod: 95

## 2021-08-11 ENCOUNTER — NON-APPOINTMENT (OUTPATIENT)
Age: 36
End: 2021-08-11

## 2021-08-12 ENCOUNTER — APPOINTMENT (OUTPATIENT)
Dept: PSYCHIATRY | Facility: CLINIC | Age: 36
End: 2021-08-12

## 2021-08-12 ENCOUNTER — OUTPATIENT (OUTPATIENT)
Dept: OUTPATIENT SERVICES | Facility: HOSPITAL | Age: 36
LOS: 1 days | Discharge: HOME | End: 2021-08-12

## 2021-08-12 VITALS
HEART RATE: 84 BPM | DIASTOLIC BLOOD PRESSURE: 78 MMHG | RESPIRATION RATE: 16 BRPM | SYSTOLIC BLOOD PRESSURE: 126 MMHG | HEIGHT: 65 IN

## 2021-08-12 DIAGNOSIS — F30.12 MANIC EPISODE WITHOUT PSYCHOTIC SYMPTOMS, MODERATE: ICD-10-CM

## 2021-08-12 RX ORDER — PALIPERIDONE PALMITATE 78 MG/.5ML
78 INJECTION INTRAMUSCULAR
Qty: 0 | Refills: 0 | Status: COMPLETED | OUTPATIENT
Start: 2021-08-12

## 2021-08-12 RX ADMIN — PALIPERIDONE PALMITATE 0 MG/0.5ML: 78 INJECTION INTRAMUSCULAR at 00:00

## 2021-08-16 ENCOUNTER — APPOINTMENT (OUTPATIENT)
Dept: PSYCHIATRY | Facility: CLINIC | Age: 36
End: 2021-08-16

## 2021-08-16 ENCOUNTER — OUTPATIENT (OUTPATIENT)
Dept: OUTPATIENT SERVICES | Facility: HOSPITAL | Age: 36
LOS: 1 days | Discharge: HOME | End: 2021-08-16

## 2021-08-16 DIAGNOSIS — F30.12 MANIC EPISODE WITHOUT PSYCHOTIC SYMPTOMS, MODERATE: ICD-10-CM

## 2021-09-03 ENCOUNTER — APPOINTMENT (OUTPATIENT)
Dept: PSYCHIATRY | Facility: CLINIC | Age: 36
End: 2021-09-03
Payer: MEDICAID

## 2021-09-03 ENCOUNTER — OUTPATIENT (OUTPATIENT)
Dept: OUTPATIENT SERVICES | Facility: HOSPITAL | Age: 36
LOS: 1 days | Discharge: HOME | End: 2021-09-03

## 2021-09-03 DIAGNOSIS — F31.9 BIPOLAR DISORDER, UNSPECIFIED: ICD-10-CM

## 2021-09-03 PROCEDURE — 99213 OFFICE O/P EST LOW 20 MIN: CPT | Mod: 95

## 2021-09-08 ENCOUNTER — NON-APPOINTMENT (OUTPATIENT)
Age: 36
End: 2021-09-08

## 2021-09-08 NOTE — ED ADULT NURSE NOTE - NS ED NURSE PATIENT LEFT UNIT TIME
ME=906 bpm, BHBF=152/103 mmhg, SpO2=92.0 %, Resp=12 B/min, EtCO2=44 mmHg, Apnea=0 Seconds, Pain=3, Alexandra=10, Valdivia=2, Comment=NSR 13:53

## 2021-09-09 ENCOUNTER — APPOINTMENT (OUTPATIENT)
Dept: PSYCHIATRY | Facility: CLINIC | Age: 36
End: 2021-09-09

## 2021-09-09 ENCOUNTER — OUTPATIENT (OUTPATIENT)
Dept: OUTPATIENT SERVICES | Facility: HOSPITAL | Age: 36
LOS: 1 days | Discharge: HOME | End: 2021-09-09

## 2021-09-09 VITALS
RESPIRATION RATE: 16 BRPM | HEART RATE: 80 BPM | SYSTOLIC BLOOD PRESSURE: 118 MMHG | DIASTOLIC BLOOD PRESSURE: 80 MMHG | HEIGHT: 65 IN

## 2021-09-09 DIAGNOSIS — F31.9 BIPOLAR DISORDER, UNSPECIFIED: ICD-10-CM

## 2021-09-13 ENCOUNTER — OUTPATIENT (OUTPATIENT)
Dept: OUTPATIENT SERVICES | Facility: HOSPITAL | Age: 36
LOS: 1 days | Discharge: HOME | End: 2021-09-13

## 2021-09-13 ENCOUNTER — APPOINTMENT (OUTPATIENT)
Dept: PSYCHIATRY | Facility: CLINIC | Age: 36
End: 2021-09-13

## 2021-09-13 DIAGNOSIS — F31.9 BIPOLAR DISORDER, UNSPECIFIED: ICD-10-CM

## 2021-09-20 NOTE — ED BEHAVIORAL HEALTH ASSESSMENT NOTE - DESCRIPTION
52 y old M with PMH of CKD s/p renal transplant, CVA, DM type 1 (on insulin pump), neuropathy, DVT, HLD, Hypertension, Obesity, CARMEN (On nocturnal NIPPV), Restrictive Lung Disease, squamous cell carcinoma presents to ED for redness/swelling R leg. Admitted for RLE cellulitis and possible osteomyelitis.     Problem/Plan - 1:  Cellulitis.   - Patient presented to the ED with RLE below the knee swelling, erythema, pain for 3 days after having a cut on his right foot. Suspect cellulitis and underlying osteomyelitis.  -duplex US No evidence of right lower extremity DVT. Subcutaneous edema is visualized throughout the right calf.  -RLE edema, erythema noted below the knee with non draining wound on lateral aspect of right foot  -s/p zosyn and vancomycin in the ED   -Sepsis criteria not met at this time, afebrile, lactate 0.3, procal<0.05  -Start linezolid (given weight and elevated R:B for vancomycin induced nephrotoxicity) pending MRSA PCR   -On IV zosyn  (has been treated w/ rocephin in the past based on Cx)   -f/u BCx x2, wound culture, MRSA/MSSA PCR, CRP  -R foot X ray:  No acute displaced fracture seen, No definite cortical erosion or periostitis at the amputation margins to suggest osteomyelitis.  -CT R foot : No CT evidence of osteomyelitis, particularly of the proximal fifth metatarsal. Lateral soft tissue wound at the level of the fifth metatarsal without discrete fluid collection.  - If clinical suspicion persists for osteomyelitis, MRI or nuclear imaging can be performed if able.  -Consulted Podiatry (Florin), following: recommendations   - Patient is  scheduled for Right Foot Incision and Drainage with pulse lavage with Dr. Jay for tomorrow.   - NPO past midnight   - will order non invasive vascular studies, patient right is non palpable PT and faintly palpable DP   - area dressed with aquacel ag and dsd    - Consult ID (Dr. Saucedo)- following: pt with dm and immune suppressive meds for renal tx so cont broad antibiotics zosyn - to complete 5-7 days depending on clinical course.      Problem/Plan - 2: Diabetes mellitus- type 1  -Patient has history of DM1 on insulin pump  -At home: insulin pump Humalog 100 units: 3 units/hour for 72 total units, continue home regimen  -hypoglycemia protocols in place  -f/u AM HbA1C  -consult diabetes specialist for pump orders, f/u recs.  - Dr. Bryant following.     Problem/Plan - 3:  ·  Problem: Anemia.   - On admission patient h/h 12.1/36.8  -baseline Is 14.5  -likely due to longstanding CKD--> renal transplant  -continue to trend h/h  -transfuse of s&s of blood loss or if hgb <7.     Problem/Plan - 4:  ·  Problem: Obstructive sleep apnea.   -Patient has history of CARMEN 2/2 to obesity   -Patient on NIPPV at home  -Patient does not remember settings, will bring machine from home  -follows with Dr. Pollo Boyce (Kenton Bal covers him).     Problem/Plan - 5:  ·  Problem: Renal transplant recipient.   - Patient received right renal transplant 2013 for CKD  - Cr on admission 1.5, baseline of 1.2 per patient - will hold ARB for now  - c/w azathioprine 100 mg daily and tacrolimus 1.5 mg BID  - check tacrolimus level 30 min before administration of medication  - patient takes tacrolimus 9:30 AM and 9:30 PM  - Consult Nephro (Dr. Bryant)- f/u recs.     Problem/Plan - 6:  ·  Problem: HTN (hypertension).   - Chronic  - Presented to the ED with /96--> 117/64  - continue with home clonidine 0.1 mg BID, Hydralazine 25 mg TID, Lopressor 75mg BID  - holding home losartan 25 mg q daily in setting of Cr bump  - hold parameters in place.  -  following      Problem/Plan - 7:  - HLD (hyperlipidemia).   - Chronic  -Therapeutic interchange of home lovastatin 40 mg to atorvastatin 10 mg  -DASH/ TLC diet, consistent carb.     Problem/Plan - 8:  - Need for prophylactic measure.   - heparin 5000 units q8. 52 y old M with PMH of CKD s/p renal transplant, CVA, DM type 1 (on insulin pump), neuropathy, DVT, HLD, Hypertension, Obesity, CARMEN (On nocturnal NIPPV), Restrictive Lung Disease, squamous cell carcinoma presents to ED for redness/swelling R leg. Admitted for RLE cellulitis and possible osteomyelitis.     Problem/Plan - 1:  Cellulitis.   - Patient presented to the ED with RLE below the knee swelling, erythema, pain for 3 days after having a cut on his right foot. Suspect cellulitis and underlying osteomyelitis.  -duplex US No evidence of right lower extremity DVT. Subcutaneous edema is visualized throughout the right calf.  -RLE edema, erythema noted below the knee with non draining wound on lateral aspect of right foot  -s/p zosyn and vancomycin in the ED   -Sepsis criteria not met at this time, afebrile, lactate 0.3, procal<0.05  -Start linezolid (given weight and elevated R:B for vancomycin induced nephrotoxicity) pending MRSA PCR   -On IV zosyn  (has been treated w/ rocephin in the past based on Cx)   -f/u BCx x2, wound culture, MRSA/MSSA PCR, CRP  -R foot X ray:  No acute displaced fracture seen, No definite cortical erosion or periostitis at the amputation margins to suggest osteomyelitis.  -CT R foot : No CT evidence of osteomyelitis, particularly of the proximal fifth metatarsal. Lateral soft tissue wound at the level of the fifth metatarsal without discrete fluid collection.  - If clinical suspicion persists for osteomyelitis, MRI or nuclear imaging can be performed if able.  -Consulted Podiatry (Florin), following: recommendations   - Patient is  scheduled for Right Foot Incision and Drainage with pulse lavage with Dr. Jay for tomorrow.   - NPO past midnight   - will order non invasive vascular studies, patient right is non palpable PT and faintly palpable DP   - area dressed with aquacel ag and dsd    - Consult ID (Dr. Saucedo)- following: pt with dm and immune suppressive meds for renal tx so cont broad antibiotics zosyn - to complete 5-7 days depending on clinical course.   - Patient is medically optimized for Right Foot Incision and Drainage with pulse lavages scheduled for tomorrow.       Problem/Plan - 2: Diabetes mellitus- type 1  -Patient has history of DM1 on insulin pump  -At home: insulin pump Humalog 100 units: 3 units/hour for 72 total units, continue home regimen  -hypoglycemia protocols in place  -f/u AM HbA1C  -consult diabetes specialist for pump orders, f/u recs.  - Dr. Bryant following.     Problem/Plan - 3:  ·  Problem: Anemia.   - On admission patient h/h 12.1/36.8  -baseline Is 14.5  -likely due to longstanding CKD--> renal transplant  -continue to trend h/h  -transfuse of s&s of blood loss or if hgb <7.     Problem/Plan - 4:  ·  Problem: Obstructive sleep apnea.   -Patient has history of CARMEN 2/2 to obesity   -Patient on NIPPV at home  -Patient does not remember settings, will bring machine from home  -follows with Dr. Pollo Boyce (Kenton Bal covers him).     Problem/Plan - 5:  ·  Problem: Renal transplant recipient.   - Patient received right renal transplant 2013 for CKD  - Cr on admission 1.5, baseline of 1.2 per patient - will hold ARB for now  - c/w azathioprine 100 mg daily and tacrolimus 1.5 mg BID  - check tacrolimus level 30 min before administration of medication  - patient takes tacrolimus 9:30 AM and 9:30 PM  - Consult Nephro (Dr. Bryant)- f/u recs.     Problem/Plan - 6:  ·  Problem: HTN (hypertension).   - Chronic  - Presented to the ED with /96--> 117/64  - continue with home clonidine 0.1 mg BID, Hydralazine 25 mg TID, Lopressor 75mg BID  - holding home losartan 25 mg q daily in setting of Cr bump  - hold parameters in place.  -  following      Problem/Plan - 7:  - HLD (hyperlipidemia).   - Chronic  -Therapeutic interchange of home lovastatin 40 mg to atorvastatin 10 mg  -DASH/ TLC diet, consistent carb.     Problem/Plan - 8:  - Need for prophylactic measure.   - heparin 5000 units q8. 52 y old M with PMH of CKD s/p renal transplant, CVA, DM type 1 (on insulin pump), neuropathy, DVT, HLD, Hypertension, Obesity, CARMEN (On nocturnal NIPPV), Restrictive Lung Disease, squamous cell carcinoma presents to ED for redness/swelling R leg. Admitted for RLE cellulitis and possible osteomyelitis.     Problem/Plan - 1:  Cellulitis.   -planning for OR w/ podiatry for I+D tomorrow  -venous doppler neg     -Start linezolid (given weight and elevated R:B for vancomycin induced nephrotoxicity) pending MRSA PCR   -On IV zosyn  (has been treated w/ rocephin in the past based on Cx)   -f/u BCx x2, wound culture, MRSA/MSSA PCR, CRP     - Patient is  scheduled for Right Foot Incision and Drainage with pulse lavage with Dr. Jay for tomorrow.  - NPO past midnight  - will order non invasive vascular studies, patient right is non palpable PT and faintly palpable DP  - area dressed with aquacel ag and dsd   - Consult ID (Dr. Saucedo)- following: pt with dm and immune suppressive meds for renal tx so cont broad antibiotics zosyn - to complete 5-7 days depending on clinical course.   - Patient is medically optimized for Right Foot Incision and Drainage with pulse lavages scheduled for tomorrow.       Problem/Plan - 2: Diabetes mellitus- type 1  -Patient has history of DM1 on insulin pump  -At home: insulin pump Humalog 100 units: 3 units/hour for 72 total units, continue home regimen  -hypoglycemia protocols in place  -f/u AM HbA1C  -consult diabetes specialist for pump orders, f/u recs.  - Dr. Bryant following.     Problem/Plan - 3:  ·  Problem: Anemia.   - On admission patient h/h 12.1/36.8  -baseline Is 14.5  -likely due to longstanding CKD--> renal transplant  -continue to trend h/h  -transfuse of s&s of blood loss or if hgb <7.     Problem/Plan - 4:  ·  Problem: Obstructive sleep apnea.   -Patient has history of CARMEN 2/2 to obesity   -Patient on NIPPV at home  -Patient does not remember settings, will bring machine from home  -follows with Dr. Pollo Boyce (Kenton Bal covers him).     Problem/Plan - 5:  ·  Problem: Renal transplant recipient.   - Patient received right renal transplant 2013 for CKD  - Cr on admission 1.5, baseline of 1.2 per patient - will hold ARB for now  - c/w azathioprine 100 mg daily and tacrolimus 1.5 mg BID  - check tacrolimus level 30 min before administration of medication  - patient takes tacrolimus 9:30 AM and 9:30 PM  - Consult Nephro (Dr. Bryant)- f/u recs.     Problem/Plan - 6:  ·  Problem: HTN (hypertension).   - Chronic  - Presented to the ED with /96--> 117/64  - continue with home clonidine 0.1 mg BID, Hydralazine 25 mg TID, Lopressor 75mg BID  - holding home losartan 25 mg q daily in setting of Cr bump  - hold parameters in place.  -  following      Problem/Plan - 7:  - HLD (hyperlipidemia).   - Chronic  -Therapeutic interchange of home lovastatin 40 mg to atorvastatin 10 mg  -DASH/ TLC diet, consistent carb.     Problem/Plan - 8:  - Need for prophylactic measure.   - heparin 5000 units q8. As per ED staff, Pt was seen "talking to the walls and pacing back and forth in hallways". Then Pt got agitated, tried to leave ED, and was medicated with Haldol and Ativan. 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 Lives with family?

## 2021-10-01 ENCOUNTER — OUTPATIENT (OUTPATIENT)
Dept: OUTPATIENT SERVICES | Facility: HOSPITAL | Age: 36
LOS: 1 days | Discharge: HOME | End: 2021-10-01

## 2021-10-01 ENCOUNTER — APPOINTMENT (OUTPATIENT)
Dept: PSYCHIATRY | Facility: CLINIC | Age: 36
End: 2021-10-01
Payer: MEDICAID

## 2021-10-01 DIAGNOSIS — F31.9 BIPOLAR DISORDER, UNSPECIFIED: ICD-10-CM

## 2021-10-01 PROCEDURE — 99213 OFFICE O/P EST LOW 20 MIN: CPT | Mod: 95

## 2021-10-06 ENCOUNTER — NON-APPOINTMENT (OUTPATIENT)
Age: 36
End: 2021-10-06

## 2021-10-06 NOTE — PATIENT PROFILE BEHAVIORAL HEALTH - NS TRANSFER PATIENT BELONGINGS
Addended by: ENMA WEBB on: 10/6/2021 03:26 PM     Modules accepted: Orders     Wrist Watch/Cell Phone/PDA (specify)/Jewelry/Money (specify)/Clothing

## 2021-10-07 ENCOUNTER — APPOINTMENT (OUTPATIENT)
Dept: PSYCHIATRY | Facility: CLINIC | Age: 36
End: 2021-10-07

## 2021-10-07 ENCOUNTER — OUTPATIENT (OUTPATIENT)
Dept: OUTPATIENT SERVICES | Facility: HOSPITAL | Age: 36
LOS: 1 days | Discharge: HOME | End: 2021-10-07

## 2021-10-07 VITALS
SYSTOLIC BLOOD PRESSURE: 114 MMHG | HEIGHT: 65 IN | DIASTOLIC BLOOD PRESSURE: 84 MMHG | RESPIRATION RATE: 16 BRPM | HEART RATE: 80 BPM | WEIGHT: 173 LBS | BODY MASS INDEX: 28.82 KG/M2

## 2021-10-07 DIAGNOSIS — F31.9 BIPOLAR DISORDER, UNSPECIFIED: ICD-10-CM

## 2021-10-07 RX ORDER — PALIPERIDONE PALMITATE 78 MG/.5ML
78 INJECTION INTRAMUSCULAR
Qty: 0 | Refills: 0 | Status: COMPLETED | OUTPATIENT
Start: 2021-10-07

## 2021-10-07 RX ADMIN — PALIPERIDONE PALMITATE 0 MG/0.5ML: 78 INJECTION INTRAMUSCULAR at 00:00

## 2021-10-19 NOTE — PATIENT PROFILE BEHAVIORAL HEALTH - TEACHING/LEARNING FACTORS IMPACT ABILITY TO LEARN
How Severe Is This Condition?: moderate anxiety Doxycycline Counseling:  Patient counseled regarding possible photosensitivity and increased risk for sunburn.  Patient instructed to avoid sunlight, if possible.  When exposed to sunlight, patients should wear protective clothing, sunglasses, and sunscreen.  The patient was instructed to call the office immediately if the following severe adverse effects occur:  hearing changes, easy bruising/bleeding, severe headache, or vision changes.  The patient verbalized understanding of the proper use and possible adverse effects of doxycycline.  All of the patient's questions and concerns were addressed.

## 2021-11-01 ENCOUNTER — OUTPATIENT (OUTPATIENT)
Dept: OUTPATIENT SERVICES | Facility: HOSPITAL | Age: 36
LOS: 1 days | Discharge: HOME | End: 2021-11-01

## 2021-11-01 ENCOUNTER — APPOINTMENT (OUTPATIENT)
Dept: PSYCHIATRY | Facility: CLINIC | Age: 36
End: 2021-11-01
Payer: MEDICAID

## 2021-11-01 DIAGNOSIS — F31.9 BIPOLAR DISORDER, UNSPECIFIED: ICD-10-CM

## 2021-11-01 PROCEDURE — 99213 OFFICE O/P EST LOW 20 MIN: CPT | Mod: 95

## 2021-11-02 ENCOUNTER — NON-APPOINTMENT (OUTPATIENT)
Age: 36
End: 2021-11-02

## 2021-11-03 ENCOUNTER — OUTPATIENT (OUTPATIENT)
Dept: OUTPATIENT SERVICES | Facility: HOSPITAL | Age: 36
LOS: 1 days | Discharge: HOME | End: 2021-11-03

## 2021-11-03 ENCOUNTER — APPOINTMENT (OUTPATIENT)
Dept: PSYCHIATRY | Facility: CLINIC | Age: 36
End: 2021-11-03

## 2021-11-03 VITALS — HEART RATE: 68 BPM | SYSTOLIC BLOOD PRESSURE: 118 MMHG | DIASTOLIC BLOOD PRESSURE: 80 MMHG | RESPIRATION RATE: 16 BRPM

## 2021-11-03 DIAGNOSIS — F31.9 BIPOLAR DISORDER, UNSPECIFIED: ICD-10-CM

## 2021-11-03 RX ORDER — PALIPERIDONE PALMITATE 78 MG/.5ML
78 INJECTION INTRAMUSCULAR
Qty: 0 | Refills: 0 | Status: COMPLETED | OUTPATIENT
Start: 2021-11-03

## 2021-11-03 RX ADMIN — PALIPERIDONE PALMITATE 0 MG/0.5ML: 78 INJECTION INTRAMUSCULAR at 00:00

## 2021-11-15 ENCOUNTER — APPOINTMENT (OUTPATIENT)
Dept: PSYCHIATRY | Facility: CLINIC | Age: 36
End: 2021-11-15

## 2021-11-15 ENCOUNTER — OUTPATIENT (OUTPATIENT)
Dept: OUTPATIENT SERVICES | Facility: HOSPITAL | Age: 36
LOS: 1 days | Discharge: HOME | End: 2021-11-15

## 2021-11-15 DIAGNOSIS — F31.9 BIPOLAR DISORDER, UNSPECIFIED: ICD-10-CM

## 2021-11-26 ENCOUNTER — APPOINTMENT (OUTPATIENT)
Dept: PSYCHIATRY | Facility: CLINIC | Age: 36
End: 2021-11-26
Payer: MEDICAID

## 2021-11-26 ENCOUNTER — OUTPATIENT (OUTPATIENT)
Dept: OUTPATIENT SERVICES | Facility: HOSPITAL | Age: 36
LOS: 1 days | Discharge: HOME | End: 2021-11-26

## 2021-11-26 DIAGNOSIS — F31.9 BIPOLAR DISORDER, UNSPECIFIED: ICD-10-CM

## 2021-11-26 PROCEDURE — 99213 OFFICE O/P EST LOW 20 MIN: CPT | Mod: 95

## 2021-12-01 ENCOUNTER — OUTPATIENT (OUTPATIENT)
Dept: OUTPATIENT SERVICES | Facility: HOSPITAL | Age: 36
LOS: 1 days | Discharge: HOME | End: 2021-12-01

## 2021-12-01 ENCOUNTER — APPOINTMENT (OUTPATIENT)
Dept: PSYCHIATRY | Facility: CLINIC | Age: 36
End: 2021-12-01

## 2021-12-01 ENCOUNTER — NON-APPOINTMENT (OUTPATIENT)
Age: 36
End: 2021-12-01

## 2021-12-01 VITALS — SYSTOLIC BLOOD PRESSURE: 120 MMHG | HEART RATE: 68 BPM | DIASTOLIC BLOOD PRESSURE: 82 MMHG | RESPIRATION RATE: 16 BRPM

## 2021-12-01 DIAGNOSIS — F31.9 BIPOLAR DISORDER, UNSPECIFIED: ICD-10-CM

## 2021-12-01 RX ORDER — PALIPERIDONE PALMITATE 39 MG/.25ML
39 INJECTION INTRAMUSCULAR
Qty: 0 | Refills: 0 | Status: COMPLETED | OUTPATIENT
Start: 2021-12-01

## 2021-12-01 RX ADMIN — PALIPERIDONE PALMITATE 0 MG/0.25ML: 39 INJECTION INTRAMUSCULAR at 00:00

## 2021-12-02 NOTE — PROGRESS NOTE BEHAVIORAL HEALTH - NSBHFUPINTERVALHXFT_PSY_A_CORE
Chart reviewed, pt seen and examined in room. Pt. reported that she got her Risperdal consta today, she expressed that she is not going to take lithium after discharge and expressed she does not want to be on lithium.   She reported her mood is good, denied any loss of interest . She was more engaged with team, able to identify her current progress sin treatment, she laughed when she acknowledge she interrupted this MD during interview, however less than before. She denied any SI or plans. She reported that she does not want to have ensure or more sugar and referred to her hyperactivity to sugar. She denied any AVH. She was seen well engaged with other peers and staff nursing. No nursing reports about violation of boundaries. No

## 2021-12-16 ENCOUNTER — TRANSCRIPTION ENCOUNTER (OUTPATIENT)
Age: 36
End: 2021-12-16

## 2021-12-17 ENCOUNTER — APPOINTMENT (OUTPATIENT)
Dept: PSYCHIATRY | Facility: CLINIC | Age: 36
End: 2021-12-17
Payer: MEDICAID

## 2021-12-17 ENCOUNTER — OUTPATIENT (OUTPATIENT)
Dept: OUTPATIENT SERVICES | Facility: HOSPITAL | Age: 36
LOS: 1 days | Discharge: HOME | End: 2021-12-17

## 2021-12-17 DIAGNOSIS — F31.9 BIPOLAR DISORDER, UNSPECIFIED: ICD-10-CM

## 2021-12-17 PROCEDURE — 99214 OFFICE O/P EST MOD 30 MIN: CPT | Mod: 95

## 2021-12-21 ENCOUNTER — NON-APPOINTMENT (OUTPATIENT)
Age: 36
End: 2021-12-21

## 2021-12-29 ENCOUNTER — APPOINTMENT (OUTPATIENT)
Dept: PSYCHIATRY | Facility: CLINIC | Age: 36
End: 2021-12-29

## 2022-01-03 ENCOUNTER — OUTPATIENT (OUTPATIENT)
Dept: OUTPATIENT SERVICES | Facility: HOSPITAL | Age: 37
LOS: 1 days | Discharge: HOME | End: 2022-01-03

## 2022-01-03 ENCOUNTER — APPOINTMENT (OUTPATIENT)
Dept: PSYCHIATRY | Facility: CLINIC | Age: 37
End: 2022-01-03
Payer: MEDICAID

## 2022-01-03 PROCEDURE — 99213 OFFICE O/P EST LOW 20 MIN: CPT | Mod: 95

## 2022-01-04 DIAGNOSIS — F31.9 BIPOLAR DISORDER, UNSPECIFIED: ICD-10-CM

## 2022-01-21 ENCOUNTER — APPOINTMENT (OUTPATIENT)
Dept: PSYCHIATRY | Facility: CLINIC | Age: 37
End: 2022-01-21
Payer: MEDICAID

## 2022-01-21 ENCOUNTER — OUTPATIENT (OUTPATIENT)
Dept: OUTPATIENT SERVICES | Facility: HOSPITAL | Age: 37
LOS: 1 days | Discharge: HOME | End: 2022-01-21

## 2022-01-21 DIAGNOSIS — F31.9 BIPOLAR DISORDER, UNSPECIFIED: ICD-10-CM

## 2022-01-21 PROCEDURE — 99213 OFFICE O/P EST LOW 20 MIN: CPT | Mod: 95

## 2022-02-14 ENCOUNTER — OUTPATIENT (OUTPATIENT)
Dept: OUTPATIENT SERVICES | Facility: HOSPITAL | Age: 37
LOS: 1 days | Discharge: HOME | End: 2022-02-14

## 2022-02-14 ENCOUNTER — APPOINTMENT (OUTPATIENT)
Dept: PSYCHIATRY | Facility: CLINIC | Age: 37
End: 2022-02-14
Payer: MEDICAID

## 2022-02-14 DIAGNOSIS — F31.9 BIPOLAR DISORDER, UNSPECIFIED: ICD-10-CM

## 2022-02-14 PROCEDURE — 99213 OFFICE O/P EST LOW 20 MIN: CPT | Mod: 95

## 2022-02-28 NOTE — PROGRESS NOTE BEHAVIORAL HEALTH - NSBHCONSORIP_PSY_A_CORE
Faxed with confirmation to Yassine Goetz in blue accordion folder in Pod 3 as well as sent to scan
Incoming fax from [facility]    Order # [id number]     Placed in  in basket for review
Inpatient Admission...

## 2022-03-11 ENCOUNTER — OUTPATIENT (OUTPATIENT)
Dept: OUTPATIENT SERVICES | Facility: HOSPITAL | Age: 37
LOS: 1 days | Discharge: HOME | End: 2022-03-11

## 2022-03-11 ENCOUNTER — APPOINTMENT (OUTPATIENT)
Dept: PSYCHIATRY | Facility: CLINIC | Age: 37
End: 2022-03-11
Payer: MEDICAID

## 2022-03-11 DIAGNOSIS — F31.9 BIPOLAR DISORDER, UNSPECIFIED: ICD-10-CM

## 2022-03-11 PROCEDURE — 99213 OFFICE O/P EST LOW 20 MIN: CPT | Mod: 95

## 2022-04-08 ENCOUNTER — OUTPATIENT (OUTPATIENT)
Dept: OUTPATIENT SERVICES | Facility: HOSPITAL | Age: 37
LOS: 1 days | Discharge: HOME | End: 2022-04-08

## 2022-04-08 ENCOUNTER — APPOINTMENT (OUTPATIENT)
Dept: PSYCHIATRY | Facility: CLINIC | Age: 37
End: 2022-04-08
Payer: MEDICAID

## 2022-04-08 DIAGNOSIS — F31.9 BIPOLAR DISORDER, UNSPECIFIED: ICD-10-CM

## 2022-04-08 PROCEDURE — 99213 OFFICE O/P EST LOW 20 MIN: CPT | Mod: 95

## 2022-05-13 ENCOUNTER — OUTPATIENT (OUTPATIENT)
Dept: OUTPATIENT SERVICES | Facility: HOSPITAL | Age: 37
LOS: 1 days | Discharge: HOME | End: 2022-05-13

## 2022-05-13 ENCOUNTER — APPOINTMENT (OUTPATIENT)
Dept: PSYCHIATRY | Facility: CLINIC | Age: 37
End: 2022-05-13
Payer: MEDICAID

## 2022-05-13 DIAGNOSIS — F31.9 BIPOLAR DISORDER, UNSPECIFIED: ICD-10-CM

## 2022-05-13 PROCEDURE — 99213 OFFICE O/P EST LOW 20 MIN: CPT | Mod: 95

## 2022-06-10 ENCOUNTER — APPOINTMENT (OUTPATIENT)
Dept: PSYCHIATRY | Facility: CLINIC | Age: 37
End: 2022-06-10

## 2022-06-10 ENCOUNTER — OUTPATIENT (OUTPATIENT)
Dept: OUTPATIENT SERVICES | Facility: HOSPITAL | Age: 37
LOS: 1 days | Discharge: HOME | End: 2022-06-10

## 2022-06-10 DIAGNOSIS — F31.9 BIPOLAR DISORDER, UNSPECIFIED: ICD-10-CM

## 2022-06-10 PROCEDURE — 99213 OFFICE O/P EST LOW 20 MIN: CPT | Mod: 95

## 2022-07-08 ENCOUNTER — APPOINTMENT (OUTPATIENT)
Dept: PSYCHIATRY | Facility: CLINIC | Age: 37
End: 2022-07-08

## 2022-07-08 ENCOUNTER — OUTPATIENT (OUTPATIENT)
Dept: OUTPATIENT SERVICES | Facility: HOSPITAL | Age: 37
LOS: 1 days | Discharge: HOME | End: 2022-07-08

## 2022-07-08 DIAGNOSIS — F31.9 BIPOLAR DISORDER, UNSPECIFIED: ICD-10-CM

## 2022-07-08 PROCEDURE — 99213 OFFICE O/P EST LOW 20 MIN: CPT | Mod: 95

## 2022-08-04 NOTE — PATIENT PROFILE BEHAVIORAL HEALTH - OCCUPATION
Tumor Depth: Less than 6mm from granular layer and no invasion beyond the subcutaneous fat dental hygenist

## 2022-09-02 ENCOUNTER — APPOINTMENT (OUTPATIENT)
Dept: PSYCHIATRY | Facility: CLINIC | Age: 37
End: 2022-09-02

## 2022-09-19 ENCOUNTER — APPOINTMENT (OUTPATIENT)
Dept: PSYCHIATRY | Facility: CLINIC | Age: 37
End: 2022-09-19

## 2022-09-19 ENCOUNTER — OUTPATIENT (OUTPATIENT)
Dept: OUTPATIENT SERVICES | Facility: HOSPITAL | Age: 37
LOS: 1 days | Discharge: HOME | End: 2022-09-19

## 2022-09-19 PROCEDURE — 99213 OFFICE O/P EST LOW 20 MIN: CPT | Mod: 95

## 2022-09-20 DIAGNOSIS — F31.9 BIPOLAR DISORDER, UNSPECIFIED: ICD-10-CM

## 2022-10-12 NOTE — DISCUSSION/SUMMARY
[Able to manage surrounding demands and opportunities] : able to manage surrounding demands and opportunities [Able to exercise self-direction] : able to exercise self-direction [Adherent to treatment recommendations] : adherent to treatment recommendations [Cognitively intact] : cognitively intact [Intelligent] : intelligent [Motivated to participate in treatment] : motivated to participate in treatment [Financially stable] : financially stable [Part of a supportive family] : part of a supportive family [Steady employment] : steady employment [Housing stability] : housing stability [English fluency] : English fluency [FreeTextEntry2] : 12/09//2022 [FreeTextEntry5] : i want to feel stable  [FreeTextEntry8] : none identified at this time  [Mental Health] : Mental Health [Continued - Progress made] : Continued - Progress made: [every ___ weeks] : every [unfilled] weeks [Other rationale for transition/discharge:] : Other rationale for transition/discharge: [Yes] : Yes [Psychiatric Provider/Prescriber] : Psychiatric Provider/Prescriber [FreeTextEntry1] : history of severe manic episodes with psychosis  [FreeTextEntry4] : I don’t want to have my episodes anymore  [de-identified] : Ms Mario continues to remain stable and functioning with good  insight and compliance with medication  [de-identified] : To remain free from active symptoms of nandini or clinical depression  [de-identified] : 12/19/2022/2022 [de-identified] : Ms Mario suffers from Bipolar 2 disorder and requires maintenance  treatment on  ongoing basis to prevent future mood episodes

## 2022-10-12 NOTE — DISCUSSION/SUMMARY
[Able to manage surrounding demands and opportunities] : able to manage surrounding demands and opportunities [Able to exercise self-direction] : able to exercise self-direction [Adherent to treatment recommendations] : adherent to treatment recommendations [Cognitively intact] : cognitively intact [Intelligent] : intelligent [Motivated to participate in treatment] : motivated to participate in treatment [Financially stable] : financially stable [Part of a supportive family] : part of a supportive family [Steady employment] : steady employment [Housing stability] : housing stability [English fluency] : English fluency [FreeTextEntry2] : 09/10/2022 [FreeTextEntry5] : i want to feel stable  [FreeTextEntry8] : none identified at this time  [Mental Health] : Mental Health [Continued - Progress made] : Continued - Progress made: [every ___ weeks] : every [unfilled] weeks [Other rationale for transition/discharge:] : Other rationale for transition/discharge: [Yes] : Yes [Psychiatric Provider/Prescriber] : Psychiatric Provider/Prescriber [FreeTextEntry1] : history of severe manic episodes with psychosis  [FreeTextEntry4] : I don’t want to have my episodes anymore  [de-identified] : Ms Mario continues to remain stable and functioning with good  insight and compliance with medication  [de-identified] : To remain free from active symptoms of nandini or clinical depression  [de-identified] : 09/10/2022 [de-identified] : Ms Mario suffers from Bipolar 2 disorder and requires maintenance  treatment on  ongoing basis to prevent future mood episodes

## 2023-01-20 ENCOUNTER — APPOINTMENT (OUTPATIENT)
Dept: PSYCHIATRY | Facility: CLINIC | Age: 38
End: 2023-01-20
Payer: MEDICAID

## 2023-01-20 ENCOUNTER — OUTPATIENT (OUTPATIENT)
Dept: OUTPATIENT SERVICES | Facility: HOSPITAL | Age: 38
LOS: 1 days | Discharge: HOME | End: 2023-01-20

## 2023-01-20 DIAGNOSIS — F31.9 BIPOLAR DISORDER, UNSPECIFIED: ICD-10-CM

## 2023-01-20 PROCEDURE — 99213 OFFICE O/P EST LOW 20 MIN: CPT | Mod: 95

## 2023-01-20 RX ORDER — PALIPERIDONE PALMITATE 156 MG/ML
156 INJECTION INTRAMUSCULAR
Refills: 0 | Status: DISCONTINUED | COMMUNITY
End: 2023-01-20

## 2023-01-20 RX ORDER — PALIPERIDONE PALMITATE 156 MG/ML
156 INJECTION INTRAMUSCULAR
Qty: 0.5 | Refills: 0 | Status: DISCONTINUED | COMMUNITY
Start: 2021-10-26 | End: 2023-01-20

## 2023-01-20 NOTE — REASON FOR VISIT
[Home] : at home, [unfilled] , at the time of the visit. [Other Location: e.g. Home (Enter Location, City,State)___] : at [unfilled] [Patient] : Patient [FreeTextEntry1] : Medication management
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
## 2023-01-20 NOTE — PAST MEDICAL HISTORY
[FreeTextEntry1] : Hx of prolactinemia - presumed to be from Invega.\par Denies any other medical issues.

## 2023-01-20 NOTE — PLAN
[Medication education provided] : Medication education provided. [Rationale for medication choices, possible risks/precautions, benefits, alternative treatment choices, and consequences of non-treatment discussed] : Rationale for medication choices, possible risks/precautions, benefits, alternative treatment choices, and consequences of non-treatment discussed with patient/family/caregiver  [FreeTextEntry4] : Medication management to maintain euthymia and prevent manic and depressive episodes over time. [FreeTextEntry5] : Continue Lithium\par Check new set of labs\par RTC 1 month

## 2023-01-20 NOTE — RISK ASSESSMENT
[No, patient denies ideation or behavior] : No, patient denies ideation or behavior [FreeTextEntry9] : Pt does not appear to be a danger to self or others.

## 2023-01-20 NOTE — DISCUSSION/SUMMARY
[FreeTextEntry1] : Pt with long-standing bipolar illness.  Presents as stable on [place] monotherapy.

## 2023-01-20 NOTE — SOCIAL HISTORY
[FreeTextEntry1] : Lives with parents.\par Works as a dental hygienist.\par Denies any alcohol or substance use.

## 2023-01-24 LAB
ALBUMIN SERPL ELPH-MCNC: 4.2 G/DL
ALP BLD-CCNC: 61 U/L
ALT SERPL-CCNC: 10 U/L
ANION GAP SERPL CALC-SCNC: 15 MMOL/L
AST SERPL-CCNC: 13 U/L
BASOPHILS # BLD AUTO: 0.06 K/UL
BASOPHILS NFR BLD AUTO: 0.9 %
BILIRUB SERPL-MCNC: 0.4 MG/DL
BUN SERPL-MCNC: 11 MG/DL
CALCIUM SERPL-MCNC: 9.4 MG/DL
CHLORIDE SERPL-SCNC: 104 MMOL/L
CHOLEST SERPL-MCNC: 178 MG/DL
CO2 SERPL-SCNC: 20 MMOL/L
CREAT SERPL-MCNC: 0.9 MG/DL
EGFR: 83 ML/MIN/1.73M2
EOSINOPHIL # BLD AUTO: 0.11 K/UL
EOSINOPHIL NFR BLD AUTO: 1.6 %
ESTIMATED AVERAGE GLUCOSE: 100 MG/DL
GLUCOSE SERPL-MCNC: 89 MG/DL
HBA1C MFR BLD HPLC: 5.1 %
HCT VFR BLD CALC: 38.6 %
HDLC SERPL-MCNC: 48 MG/DL
HGB BLD-MCNC: 12.5 G/DL
IMM GRANULOCYTES NFR BLD AUTO: 0.3 %
LDLC SERPL CALC-MCNC: 111 MG/DL
LITHIUM SERPL-SCNC: 0.41 MMOL/L
LYMPHOCYTES # BLD AUTO: 1.54 K/UL
LYMPHOCYTES NFR BLD AUTO: 22.1 %
MAN DIFF?: NORMAL
MCHC RBC-ENTMCNC: 29.3 PG
MCHC RBC-ENTMCNC: 32.4 G/DL
MCV RBC AUTO: 90.4 FL
MONOCYTES # BLD AUTO: 0.46 K/UL
MONOCYTES NFR BLD AUTO: 6.6 %
NEUTROPHILS # BLD AUTO: 4.77 K/UL
NEUTROPHILS NFR BLD AUTO: 68.5 %
NONHDLC SERPL-MCNC: 130 MG/DL
PLATELET # BLD AUTO: 337 K/UL
POTASSIUM SERPL-SCNC: 4.3 MMOL/L
PROLACTIN SERPL-MCNC: 76.6 NG/ML
PROT SERPL-MCNC: 7 G/DL
RBC # BLD: 4.27 M/UL
RBC # FLD: 12.6 %
SODIUM SERPL-SCNC: 139 MMOL/L
TRIGL SERPL-MCNC: 97 MG/DL
TSH SERPL-ACNC: 2.11 UIU/ML
WBC # FLD AUTO: 6.96 K/UL

## 2023-02-13 NOTE — PROGRESS NOTE BEHAVIORAL HEALTH - NSBHFUPINTERVALCCFT_PSY_A_CORE
Second part to message:    So,  I got through the night, I was scared because I had a lot of pounding of my aorta and pain in the middle of the night.   I am not going to take any T3 medicine and just take the 75 Levothyroxine until I can see someone. I know the Free T3 is high.  I had taken less of these meds anyway over the last several months because cardiovascularly  I was having symptoms, and thought that was why. It seemed to get better,   I would like to see an endocrinologist very soon to work with my thyroid and also I would like to be seen for my pounding aorta.  Would I be able to have checked  my estrogen and progesterone levels too, because Im not sure my body is processing meds correctly.  Im open to anything else,  I am pretty concerned because of my pain in my abdomen, inguinal creases and the CV symptoms I am experiencing.  Thank you     "I wrote you a letter, happy birthday"

## 2023-02-17 ENCOUNTER — APPOINTMENT (OUTPATIENT)
Dept: PSYCHIATRY | Facility: CLINIC | Age: 38
End: 2023-02-17
Payer: MEDICAID

## 2023-02-17 ENCOUNTER — OUTPATIENT (OUTPATIENT)
Dept: OUTPATIENT SERVICES | Facility: HOSPITAL | Age: 38
LOS: 1 days | End: 2023-02-17
Payer: MEDICAID

## 2023-02-17 ENCOUNTER — TRANSCRIPTION ENCOUNTER (OUTPATIENT)
Age: 38
End: 2023-02-17

## 2023-02-17 ENCOUNTER — APPOINTMENT (OUTPATIENT)
Dept: PSYCHIATRY | Facility: CLINIC | Age: 38
End: 2023-02-17

## 2023-02-17 DIAGNOSIS — F19.10 OTHER PSYCHOACTIVE SUBSTANCE ABUSE, UNCOMPLICATED: ICD-10-CM

## 2023-02-17 PROCEDURE — 99213 OFFICE O/P EST LOW 20 MIN: CPT | Mod: 95
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
## 2023-02-17 NOTE — PLAN
[Medication education provided] : Medication education provided. [Rationale for medication choices, possible risks/precautions, benefits, alternative treatment choices, and consequences of non-treatment discussed] : Rationale for medication choices, possible risks/precautions, benefits, alternative treatment choices, and consequences of non-treatment discussed with patient/family/caregiver  [FreeTextEntry4] : Medication management to maintain euthymia and prevent manic and depressive episodes over time. [FreeTextEntry5] : Increase Lithium to 900mg daily\par Follow-up elevated prolactin with PCP\par RTC 1 month

## 2023-02-17 NOTE — DISCUSSION/SUMMARY
[FreeTextEntry1] : Pt with long-standing bipolar illness.  Presents as stable on Lithium monotherapy, but at a subtherapeutic dose.\par Prolactinemia persisting long after Invega discontinuation.

## 2023-02-17 NOTE — REASON FOR VISIT
[Home] : at home, [unfilled] , at the time of the visit. [Other Location: e.g. Home (Enter Location, City,State)___] : at [unfilled] [Patient] : the patient [FreeTextEntry1] : Medication management

## 2023-02-24 DIAGNOSIS — F31.9 BIPOLAR DISORDER, UNSPECIFIED: ICD-10-CM

## 2023-02-28 DIAGNOSIS — F31.9 BIPOLAR DISORDER, UNSPECIFIED: ICD-10-CM

## 2023-03-24 ENCOUNTER — OUTPATIENT (OUTPATIENT)
Dept: OUTPATIENT SERVICES | Facility: HOSPITAL | Age: 38
LOS: 1 days | End: 2023-03-24
Payer: MEDICAID

## 2023-03-24 ENCOUNTER — APPOINTMENT (OUTPATIENT)
Dept: PSYCHIATRY | Facility: CLINIC | Age: 38
End: 2023-03-24
Payer: MEDICAID

## 2023-03-24 DIAGNOSIS — F31.9 BIPOLAR DISORDER, UNSPECIFIED: ICD-10-CM

## 2023-03-24 PROCEDURE — 99213 OFFICE O/P EST LOW 20 MIN: CPT | Mod: 95

## 2023-03-24 NOTE — PLAN
[Medication education provided] : Medication education provided. [Rationale for medication choices, possible risks/precautions, benefits, alternative treatment choices, and consequences of non-treatment discussed] : Rationale for medication choices, possible risks/precautions, benefits, alternative treatment choices, and consequences of non-treatment discussed with patient/family/caregiver  [FreeTextEntry4] : Medication management to maintain euthymia and prevent manic and depressive episodes over time. [FreeTextEntry5] : Continue Lithium at 900mg daily\par RTC 1 month
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
## 2023-03-25 DIAGNOSIS — F31.9 BIPOLAR DISORDER, UNSPECIFIED: ICD-10-CM

## 2023-03-27 ENCOUNTER — OUTPATIENT (OUTPATIENT)
Dept: OUTPATIENT SERVICES | Facility: HOSPITAL | Age: 38
LOS: 1 days | End: 2023-03-27
Payer: MEDICAID

## 2023-03-27 DIAGNOSIS — Z12.31 ENCOUNTER FOR SCREENING MAMMOGRAM FOR MALIGNANT NEOPLASM OF BREAST: ICD-10-CM

## 2023-03-27 DIAGNOSIS — R79.89 OTHER SPECIFIED ABNORMAL FINDINGS OF BLOOD CHEMISTRY: ICD-10-CM

## 2023-03-27 DIAGNOSIS — Z00.8 ENCOUNTER FOR OTHER GENERAL EXAMINATION: ICD-10-CM

## 2023-03-27 PROCEDURE — 77063 BREAST TOMOSYNTHESIS BI: CPT | Mod: 26

## 2023-03-27 PROCEDURE — 70551 MRI BRAIN STEM W/O DYE: CPT

## 2023-03-27 PROCEDURE — 70551 MRI BRAIN STEM W/O DYE: CPT | Mod: 26,76

## 2023-03-27 PROCEDURE — 77067 SCR MAMMO BI INCL CAD: CPT | Mod: 26

## 2023-03-27 PROCEDURE — 77067 SCR MAMMO BI INCL CAD: CPT

## 2023-03-27 PROCEDURE — 77063 BREAST TOMOSYNTHESIS BI: CPT

## 2023-03-27 NOTE — DISCUSSION/SUMMARY
[Able to manage surrounding demands and opportunities] : able to manage surrounding demands and opportunities [Adherent to treatment recommendations] : adherent to treatment recommendations [Insightful] : insightful [Motivated to participate in treatment] : motivated to participate in treatment [Financially stable] : financially stable [Steady employment] : steady employment [Housing stability] : housing stability [FreeTextEntry2] : 2/24/24 [FreeTextEntry5] : "To stay stable and out of the hospital" [Mental Health] : Mental Health [Continued - Progress made] : Continued - Progress made: [Physical Health] : Physical Health [Initial] : Initial [every ___ months] : every [unfilled] months [Improvement in symptoms as evidenced by:] : Improvement in symptoms as evidenced by: [Yes] : Yes [Psychiatric Provider/Prescriber] : Psychiatric Provider/Prescriber [FreeTextEntry1] : sustained prolactin elevation [FreeTextEntry4] : To identify the cause [de-identified] : Previous elevation was thought to be med-induced, but it has persisted. [de-identified] : Complete diagnostic work-up with PCP. [de-identified] : 6/24/23 [de-identified] : Yet to have MRI [de-identified] : Consider treatments - if any recommended [de-identified] : 6/24/23 [de-identified] : patient report and MSE [de-identified] : P

## 2023-03-27 NOTE — DISCUSSION/SUMMARY
[Able to manage surrounding demands and opportunities] : able to manage surrounding demands and opportunities [Adherent to treatment recommendations] : adherent to treatment recommendations [Insightful] : insightful [Motivated to participate in treatment] : motivated to participate in treatment [Financially stable] : financially stable [Steady employment] : steady employment [Housing stability] : housing stability [FreeTextEntry2] : 2/24/24 [FreeTextEntry5] : "To stay stable and out of the hospital" [Mental Health] : Mental Health [Continued - Progress made] : Continued - Progress made: [Physical Health] : Physical Health [Initial] : Initial [every ___ months] : every [unfilled] months [Improvement in symptoms as evidenced by:] : Improvement in symptoms as evidenced by: [Yes] : Yes [Psychiatric Provider/Prescriber] : Psychiatric Provider/Prescriber [FreeTextEntry1] : sustained prolactin elevation [FreeTextEntry4] : To identify the cause [de-identified] : Previous elevation was thought to be med-induced, but it has persisted. [de-identified] : Complete diagnostic work-up with PCP. [de-identified] : 6/24/23 [de-identified] : Yet to have MRI [de-identified] : Consider treatments - if any recommended [de-identified] : 6/24/23 [de-identified] : patient report and MSE [de-identified] : P

## 2023-03-27 NOTE — RISK ASSESSMENT
[FreeTextEntry9] : Pt does not appear to be a danger to self or others. [No, patient denies ideation or behavior] : No, patient denies ideation or behavior

## 2023-03-28 DIAGNOSIS — Z12.31 ENCOUNTER FOR SCREENING MAMMOGRAM FOR MALIGNANT NEOPLASM OF BREAST: ICD-10-CM

## 2023-03-28 DIAGNOSIS — R79.89 OTHER SPECIFIED ABNORMAL FINDINGS OF BLOOD CHEMISTRY: ICD-10-CM

## 2023-04-21 ENCOUNTER — APPOINTMENT (OUTPATIENT)
Dept: PSYCHIATRY | Facility: CLINIC | Age: 38
End: 2023-04-21
Payer: MEDICAID

## 2023-04-21 ENCOUNTER — OUTPATIENT (OUTPATIENT)
Dept: OUTPATIENT SERVICES | Facility: HOSPITAL | Age: 38
LOS: 1 days | End: 2023-04-21
Payer: MEDICAID

## 2023-04-21 ENCOUNTER — NON-APPOINTMENT (OUTPATIENT)
Age: 38
End: 2023-04-21

## 2023-04-21 DIAGNOSIS — F31.9 BIPOLAR DISORDER, UNSPECIFIED: ICD-10-CM

## 2023-04-21 PROCEDURE — 99213 OFFICE O/P EST LOW 20 MIN: CPT | Mod: 95

## 2023-04-21 NOTE — PLAN
[Medication education provided] : Medication education provided. [Rationale for medication choices, possible risks/precautions, benefits, alternative treatment choices, and consequences of non-treatment discussed] : Rationale for medication choices, possible risks/precautions, benefits, alternative treatment choices, and consequences of non-treatment discussed with patient/family/caregiver  [FreeTextEntry4] : Medication management to maintain euthymia and prevent manic and depressive episodes over time. [FreeTextEntry5] : Continue Lithium at 900mg daily\par New labs\par RTC 1 month
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
## 2023-04-21 NOTE — DISCUSSION/SUMMARY
[Date of Last HbgA1c: _____] : Date of Last HbgA1c: [unfilled] [Date of Last Lipid Profile: _____] : Date of Last Lipid Profile: [unfilled] [FreeTextEntry1] : Pt with long-standing bipolar illness. Remains stable on Lithium monotherapy.\par

## 2023-04-22 ENCOUNTER — NON-APPOINTMENT (OUTPATIENT)
Age: 38
End: 2023-04-22

## 2023-04-22 DIAGNOSIS — F31.9 BIPOLAR DISORDER, UNSPECIFIED: ICD-10-CM

## 2023-04-30 ENCOUNTER — LABORATORY RESULT (OUTPATIENT)
Age: 38
End: 2023-04-30

## 2023-05-01 ENCOUNTER — OUTPATIENT (OUTPATIENT)
Dept: OUTPATIENT SERVICES | Facility: HOSPITAL | Age: 38
LOS: 1 days | End: 2023-05-01

## 2023-05-01 ENCOUNTER — APPOINTMENT (OUTPATIENT)
Dept: OBGYN | Facility: CLINIC | Age: 38
End: 2023-05-01
Payer: MEDICAID

## 2023-05-01 ENCOUNTER — OUTPATIENT (OUTPATIENT)
Dept: OUTPATIENT SERVICES | Facility: HOSPITAL | Age: 38
LOS: 1 days | End: 2023-05-01
Payer: MEDICAID

## 2023-05-01 VITALS
SYSTOLIC BLOOD PRESSURE: 129 MMHG | BODY MASS INDEX: 28.32 KG/M2 | HEIGHT: 65 IN | HEART RATE: 94 BPM | DIASTOLIC BLOOD PRESSURE: 86 MMHG | WEIGHT: 170 LBS

## 2023-05-01 DIAGNOSIS — R19.00 INTRA-ABDOMINAL AND PELVIC SWELLING, MASS AND LUMP, UNSPECIFIED SITE: ICD-10-CM

## 2023-05-01 DIAGNOSIS — Z01.419 ENCOUNTER FOR GYNECOLOGICAL EXAMINATION (GENERAL) (ROUTINE) W/OUT ABNORMAL FINDINGS: ICD-10-CM

## 2023-05-01 LAB
BILIRUB UR QL STRIP: NORMAL
CLARITY UR: CLEAR
COLLECTION METHOD: NORMAL
GLUCOSE UR-MCNC: NORMAL
HCG UR QL: 0.2 EU/DL
HGB UR QL STRIP.AUTO: NORMAL
KETONES UR-MCNC: NORMAL
LEUKOCYTE ESTERASE UR QL STRIP: NORMAL
NITRITE UR QL STRIP: NORMAL
PH UR STRIP: 6
PROT UR STRIP-MCNC: NORMAL
SP GR UR STRIP: 1.01

## 2023-05-01 PROCEDURE — 81003 URINALYSIS AUTO W/O SCOPE: CPT | Mod: QW

## 2023-05-01 PROCEDURE — 87801 DETECT AGNT MULT DNA AMPLI: CPT

## 2023-05-01 PROCEDURE — 87491 CHLMYD TRACH DNA AMP PROBE: CPT

## 2023-05-01 PROCEDURE — 99386 PREV VISIT NEW AGE 40-64: CPT

## 2023-05-01 PROCEDURE — 87591 N.GONORRHOEAE DNA AMP PROB: CPT

## 2023-05-01 PROCEDURE — 80053 COMPREHEN METABOLIC PANEL: CPT

## 2023-05-01 PROCEDURE — 36415 COLL VENOUS BLD VENIPUNCTURE: CPT

## 2023-05-01 PROCEDURE — 80178 ASSAY OF LITHIUM: CPT

## 2023-05-01 PROCEDURE — 84146 ASSAY OF PROLACTIN: CPT

## 2023-05-01 PROCEDURE — 87661 TRICHOMONAS VAGINALIS AMPLIF: CPT

## 2023-05-01 PROCEDURE — 87798 DETECT AGENT NOS DNA AMP: CPT

## 2023-05-01 NOTE — PROCEDURE
[Cervical Pap Smear] : cervical Pap smear [Liquid Base] : liquid base [GC & Chlamydia via Pap] : GC & Chlamydia via Pap [Tolerated Well] : the patient tolerated the procedure well [No Complications] : there were no complications [Suspected Ovarian Cyst] : suspected ovarian cyst [Transvaginal Ultrasound] : transvaginal ultrasound [Anteverted] : anteverted [FreeTextEntry8] : complex mass

## 2023-05-01 NOTE — PHYSICAL EXAM
[Chaperone Present] : A chaperone was present in the examining room during all aspects of the physical examination [Appropriately responsive] : appropriately responsive [Alert] : alert [No Acute Distress] : no acute distress [Soft] : soft [Non-tender] : non-tender [Non-distended] : non-distended [Oriented x3] : oriented x3 [Examination Of The Breasts] : a normal appearance [No Discharge] : no discharge [No Masses] : no breast masses were palpable [Labia Majora] : normal [Labia Minora] : normal [Normal] : normal [Anteversion] : anteverted [___] : [unfilled] cm mass on the left [Discharge] : a  ~M vaginal discharge was present [Heavy] : heavy [Foul Smelling] : not foul smelling [White] : white [Thick] : thick

## 2023-05-01 NOTE — HISTORY OF PRESENT ILLNESS
[Y] : Patient is sexually active [N] : Patient denies prior pregnancies [Currently Active] : currently active [FreeTextEntry1] : new patient, initial visit\par presents for annual gyn exam, last gyn exam was 4 yrs ago\par pt has no specific gyn complaints, , reg menses \par has elevated prolactin, pt thinks it is a side effect from medication she was taking for bipolar disorder\par pt was amenorrheic while taking medication but periods resumed when she stopped it \par prolactin level coming down since discontinuing medication\par MRI brain (-) \par  [LMPDate] : 4/17/23 [MensesFreq] : 28 [MensesLength] : 3 [MensesAmount] : light

## 2023-05-03 LAB
C TRACH RRNA SPEC QL NAA+PROBE: NOT DETECTED
HPV HIGH+LOW RISK DNA PNL CVX: NOT DETECTED
N GONORRHOEA RRNA SPEC QL NAA+PROBE: NOT DETECTED
SOURCE AMPLIFICATION: NORMAL

## 2023-05-05 LAB
ALBUMIN SERPL ELPH-MCNC: 4.4 G/DL
ALP BLD-CCNC: 75 U/L
ALT SERPL-CCNC: 11 U/L
ANION GAP SERPL CALC-SCNC: 11 MMOL/L
AST SERPL-CCNC: 13 U/L
BILIRUB SERPL-MCNC: 0.3 MG/DL
BUN SERPL-MCNC: 19 MG/DL
CALCIUM SERPL-MCNC: 10.5 MG/DL
CHLORIDE SERPL-SCNC: 103 MMOL/L
CO2 SERPL-SCNC: 22 MMOL/L
CREAT SERPL-MCNC: 0.8 MG/DL
CYTOLOGY CVX/VAG DOC THIN PREP: NORMAL
EGFR: 95 ML/MIN/1.73M2
GLUCOSE SERPL-MCNC: 92 MG/DL
LITHIUM SERPL-SCNC: 0.72 MMOL/L
POTASSIUM SERPL-SCNC: 4.8 MMOL/L
PROLACTIN SERPL-MCNC: 37.9 NG/ML
PROT SERPL-MCNC: 7.9 G/DL
SODIUM SERPL-SCNC: 136 MMOL/L

## 2023-05-13 ENCOUNTER — RESULT REVIEW (OUTPATIENT)
Age: 38
End: 2023-05-13

## 2023-05-13 ENCOUNTER — OUTPATIENT (OUTPATIENT)
Dept: OUTPATIENT SERVICES | Facility: HOSPITAL | Age: 38
LOS: 1 days | End: 2023-05-13
Payer: MEDICAID

## 2023-05-13 DIAGNOSIS — R92.2 INCONCLUSIVE MAMMOGRAM: ICD-10-CM

## 2023-05-13 DIAGNOSIS — R19.00 INTRA-ABDOMINAL AND PELVIC SWELLING, MASS AND LUMP, UNSPECIFIED SITE: ICD-10-CM

## 2023-05-13 DIAGNOSIS — Z00.8 ENCOUNTER FOR OTHER GENERAL EXAMINATION: ICD-10-CM

## 2023-05-13 PROCEDURE — 76641 ULTRASOUND BREAST COMPLETE: CPT | Mod: 26,50

## 2023-05-13 PROCEDURE — 76641 ULTRASOUND BREAST COMPLETE: CPT | Mod: 50

## 2023-05-13 PROCEDURE — 76830 TRANSVAGINAL US NON-OB: CPT | Mod: 26

## 2023-05-13 PROCEDURE — 76830 TRANSVAGINAL US NON-OB: CPT

## 2023-05-14 DIAGNOSIS — R19.00 INTRA-ABDOMINAL AND PELVIC SWELLING, MASS AND LUMP, UNSPECIFIED SITE: ICD-10-CM

## 2023-05-14 DIAGNOSIS — R92.2 INCONCLUSIVE MAMMOGRAM: ICD-10-CM

## 2023-05-19 ENCOUNTER — APPOINTMENT (OUTPATIENT)
Dept: PSYCHIATRY | Facility: CLINIC | Age: 38
End: 2023-05-19
Payer: MEDICAID

## 2023-05-19 ENCOUNTER — OUTPATIENT (OUTPATIENT)
Dept: OUTPATIENT SERVICES | Facility: HOSPITAL | Age: 38
LOS: 1 days | End: 2023-05-19
Payer: MEDICAID

## 2023-05-19 DIAGNOSIS — F31.9 BIPOLAR DISORDER, UNSPECIFIED: ICD-10-CM

## 2023-05-19 PROCEDURE — 99213 OFFICE O/P EST LOW 20 MIN: CPT | Mod: 95

## 2023-05-19 NOTE — PLAN
[Medication education provided] : Medication education provided. [Rationale for medication choices, possible risks/precautions, benefits, alternative treatment choices, and consequences of non-treatment discussed] : Rationale for medication choices, possible risks/precautions, benefits, alternative treatment choices, and consequences of non-treatment discussed with patient/family/caregiver  [FreeTextEntry4] : Medication management to maintain euthymia and prevent manic and depressive episodes over time. [FreeTextEntry5] : Continue Lithium at 900mg daily\par RTC 6 weeks
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
## 2023-05-20 DIAGNOSIS — F31.9 BIPOLAR DISORDER, UNSPECIFIED: ICD-10-CM

## 2023-05-22 ENCOUNTER — NON-APPOINTMENT (OUTPATIENT)
Age: 38
End: 2023-05-22

## 2023-05-23 ENCOUNTER — LABORATORY RESULT (OUTPATIENT)
Age: 38
End: 2023-05-23

## 2023-05-23 ENCOUNTER — NON-APPOINTMENT (OUTPATIENT)
Age: 38
End: 2023-05-23

## 2023-05-24 ENCOUNTER — NON-APPOINTMENT (OUTPATIENT)
Age: 38
End: 2023-05-24

## 2023-06-05 ENCOUNTER — NON-APPOINTMENT (OUTPATIENT)
Age: 38
End: 2023-06-05

## 2023-06-09 ENCOUNTER — NON-APPOINTMENT (OUTPATIENT)
Age: 38
End: 2023-06-09

## 2023-06-13 NOTE — PROGRESS NOTE BEHAVIORAL HEALTH - NSBHPTASSESSDT_PSY_A_CORE
LVM & sent mychart // pt needs to reschedule appt on 8.1.23 with Dr. Ramirez - please reschedule into held slot with Dr. Leventhal on 8.10.23 at 8am or 1130am with labs prior // first attempt, AN 6.13.23   26-Feb-2018 12:32

## 2023-06-16 ENCOUNTER — OUTPATIENT (OUTPATIENT)
Dept: OUTPATIENT SERVICES | Facility: HOSPITAL | Age: 38
LOS: 1 days | End: 2023-06-16
Payer: MEDICAID

## 2023-06-16 ENCOUNTER — RESULT REVIEW (OUTPATIENT)
Age: 38
End: 2023-06-16

## 2023-06-16 DIAGNOSIS — R19.00 INTRA-ABDOMINAL AND PELVIC SWELLING, MASS AND LUMP, UNSPECIFIED SITE: ICD-10-CM

## 2023-06-16 DIAGNOSIS — Z00.8 ENCOUNTER FOR OTHER GENERAL EXAMINATION: ICD-10-CM

## 2023-06-16 PROCEDURE — 72197 MRI PELVIS W/O & W/DYE: CPT | Mod: 26

## 2023-06-16 PROCEDURE — 72197 MRI PELVIS W/O & W/DYE: CPT

## 2023-06-16 PROCEDURE — A9579: CPT

## 2023-06-17 DIAGNOSIS — R19.00 INTRA-ABDOMINAL AND PELVIC SWELLING, MASS AND LUMP, UNSPECIFIED SITE: ICD-10-CM

## 2023-06-29 ENCOUNTER — NON-APPOINTMENT (OUTPATIENT)
Age: 38
End: 2023-06-29

## 2023-06-30 ENCOUNTER — APPOINTMENT (OUTPATIENT)
Dept: PSYCHIATRY | Facility: CLINIC | Age: 38
End: 2023-06-30
Payer: MEDICAID

## 2023-06-30 ENCOUNTER — OUTPATIENT (OUTPATIENT)
Dept: OUTPATIENT SERVICES | Facility: HOSPITAL | Age: 38
LOS: 1 days | End: 2023-06-30
Payer: MEDICAID

## 2023-06-30 DIAGNOSIS — F31.9 BIPOLAR DISORDER, UNSPECIFIED: ICD-10-CM

## 2023-06-30 PROCEDURE — 99214 OFFICE O/P EST MOD 30 MIN: CPT | Mod: 95
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
## 2023-07-01 DIAGNOSIS — F31.9 BIPOLAR DISORDER, UNSPECIFIED: ICD-10-CM

## 2023-07-11 ENCOUNTER — NON-APPOINTMENT (OUTPATIENT)
Age: 38
End: 2023-07-11

## 2023-07-18 ENCOUNTER — LABORATORY RESULT (OUTPATIENT)
Age: 38
End: 2023-07-18

## 2023-07-18 ENCOUNTER — APPOINTMENT (OUTPATIENT)
Dept: OBGYN | Facility: CLINIC | Age: 38
End: 2023-07-18
Payer: MEDICAID

## 2023-07-18 VITALS
SYSTOLIC BLOOD PRESSURE: 140 MMHG | DIASTOLIC BLOOD PRESSURE: 92 MMHG | HEART RATE: 92 BPM | WEIGHT: 170 LBS | HEIGHT: 65 IN | BODY MASS INDEX: 28.32 KG/M2

## 2023-07-18 DIAGNOSIS — D36.9 BENIGN NEOPLASM, UNSPECIFIED SITE: ICD-10-CM

## 2023-07-18 PROCEDURE — 99214 OFFICE O/P EST MOD 30 MIN: CPT

## 2023-07-18 NOTE — HISTORY OF PRESENT ILLNESS
[FreeTextEntry1] : Here to discuss surgery of her dermoid cyst\par all markers negative\par wants to keep reproductive plans open, but prefers unilateral salpingoophorectomy as opposed to cystectomy and possible re-operation if the frozen is negative...or upstaging if there is rupture. Discharged

## 2023-07-21 ENCOUNTER — NON-APPOINTMENT (OUTPATIENT)
Age: 38
End: 2023-07-21

## 2023-07-25 ENCOUNTER — NON-APPOINTMENT (OUTPATIENT)
Age: 38
End: 2023-07-25

## 2023-07-28 ENCOUNTER — OUTPATIENT (OUTPATIENT)
Dept: OUTPATIENT SERVICES | Facility: HOSPITAL | Age: 38
LOS: 1 days | End: 2023-07-28
Payer: MEDICAID

## 2023-07-28 VITALS
HEART RATE: 74 BPM | OXYGEN SATURATION: 99 % | HEIGHT: 65 IN | RESPIRATION RATE: 16 BRPM | WEIGHT: 169.98 LBS | DIASTOLIC BLOOD PRESSURE: 92 MMHG | SYSTOLIC BLOOD PRESSURE: 136 MMHG

## 2023-07-28 DIAGNOSIS — D36.9 BENIGN NEOPLASM, UNSPECIFIED SITE: ICD-10-CM

## 2023-07-28 LAB
ALBUMIN SERPL ELPH-MCNC: 4.5 G/DL — SIGNIFICANT CHANGE UP (ref 3.5–5.2)
ALP SERPL-CCNC: 61 U/L — SIGNIFICANT CHANGE UP (ref 30–115)
ALT FLD-CCNC: 11 U/L — SIGNIFICANT CHANGE UP (ref 0–41)
ANION GAP SERPL CALC-SCNC: 12 MMOL/L — SIGNIFICANT CHANGE UP (ref 7–14)
AST SERPL-CCNC: 17 U/L — SIGNIFICANT CHANGE UP (ref 0–41)
BASOPHILS # BLD AUTO: 0.05 K/UL — SIGNIFICANT CHANGE UP (ref 0–0.2)
BASOPHILS NFR BLD AUTO: 0.5 % — SIGNIFICANT CHANGE UP (ref 0–1)
BILIRUB SERPL-MCNC: 0.3 MG/DL — SIGNIFICANT CHANGE UP (ref 0.2–1.2)
BUN SERPL-MCNC: 15 MG/DL — SIGNIFICANT CHANGE UP (ref 10–20)
CALCIUM SERPL-MCNC: 9.9 MG/DL — SIGNIFICANT CHANGE UP (ref 8.4–10.5)
CHLORIDE SERPL-SCNC: 102 MMOL/L — SIGNIFICANT CHANGE UP (ref 98–110)
CO2 SERPL-SCNC: 22 MMOL/L — SIGNIFICANT CHANGE UP (ref 17–32)
CREAT SERPL-MCNC: 0.8 MG/DL — SIGNIFICANT CHANGE UP (ref 0.7–1.5)
EGFR: 95 ML/MIN/1.73M2 — SIGNIFICANT CHANGE UP
EOSINOPHIL # BLD AUTO: 0.29 K/UL — SIGNIFICANT CHANGE UP (ref 0–0.7)
EOSINOPHIL NFR BLD AUTO: 3.1 % — SIGNIFICANT CHANGE UP (ref 0–8)
GLUCOSE SERPL-MCNC: 60 MG/DL — LOW (ref 70–99)
HCT VFR BLD CALC: 38.7 % — SIGNIFICANT CHANGE UP (ref 37–47)
HGB BLD-MCNC: 12.4 G/DL — SIGNIFICANT CHANGE UP (ref 12–16)
IMM GRANULOCYTES NFR BLD AUTO: 0.3 % — SIGNIFICANT CHANGE UP (ref 0.1–0.3)
LYMPHOCYTES # BLD AUTO: 2.57 K/UL — SIGNIFICANT CHANGE UP (ref 1.2–3.4)
LYMPHOCYTES # BLD AUTO: 27.3 % — SIGNIFICANT CHANGE UP (ref 20.5–51.1)
MCHC RBC-ENTMCNC: 28.7 PG — SIGNIFICANT CHANGE UP (ref 27–31)
MCHC RBC-ENTMCNC: 32 G/DL — SIGNIFICANT CHANGE UP (ref 32–37)
MCV RBC AUTO: 89.6 FL — SIGNIFICANT CHANGE UP (ref 81–99)
MONOCYTES # BLD AUTO: 0.6 K/UL — SIGNIFICANT CHANGE UP (ref 0.1–0.6)
MONOCYTES NFR BLD AUTO: 6.4 % — SIGNIFICANT CHANGE UP (ref 1.7–9.3)
NEUTROPHILS # BLD AUTO: 5.89 K/UL — SIGNIFICANT CHANGE UP (ref 1.4–6.5)
NEUTROPHILS NFR BLD AUTO: 62.4 % — SIGNIFICANT CHANGE UP (ref 42.2–75.2)
NRBC # BLD: 0 /100 WBCS — SIGNIFICANT CHANGE UP (ref 0–0)
PLATELET # BLD AUTO: 354 K/UL — SIGNIFICANT CHANGE UP (ref 130–400)
PMV BLD: 10.1 FL — SIGNIFICANT CHANGE UP (ref 7.4–10.4)
POTASSIUM SERPL-MCNC: 4.6 MMOL/L — SIGNIFICANT CHANGE UP (ref 3.5–5)
POTASSIUM SERPL-SCNC: 4.6 MMOL/L — SIGNIFICANT CHANGE UP (ref 3.5–5)
PROT SERPL-MCNC: 7.6 G/DL — SIGNIFICANT CHANGE UP (ref 6–8)
RBC # BLD: 4.32 M/UL — SIGNIFICANT CHANGE UP (ref 4.2–5.4)
RBC # FLD: 11.6 % — SIGNIFICANT CHANGE UP (ref 11.5–14.5)
SODIUM SERPL-SCNC: 136 MMOL/L — SIGNIFICANT CHANGE UP (ref 135–146)
WBC # BLD: 9.43 K/UL — SIGNIFICANT CHANGE UP (ref 4.8–10.8)
WBC # FLD AUTO: 9.43 K/UL — SIGNIFICANT CHANGE UP (ref 4.8–10.8)

## 2023-07-28 PROCEDURE — 93005 ELECTROCARDIOGRAM TRACING: CPT

## 2023-07-28 PROCEDURE — 36415 COLL VENOUS BLD VENIPUNCTURE: CPT

## 2023-07-28 PROCEDURE — 93010 ELECTROCARDIOGRAM REPORT: CPT

## 2023-07-28 PROCEDURE — 85025 COMPLETE CBC W/AUTO DIFF WBC: CPT

## 2023-07-28 PROCEDURE — 99214 OFFICE O/P EST MOD 30 MIN: CPT | Mod: 25

## 2023-07-28 PROCEDURE — 80053 COMPREHEN METABOLIC PANEL: CPT

## 2023-07-28 NOTE — H&P PST ADULT - HISTORY OF PRESENT ILLNESS
38yr old female states routine GYN visit PELVIC US revealed " Complex cystic structure adjacent to the right ovary measuring 7.0 cm of unclear significance. Findings could represent a large dermoid. Consider   cross-sectional imaging for further evaluation. Denies pelvic pain or heavy menstrual bleeding. Denies COVID S/S. Recd 3 doses vaccine. Verbalized understanding  of COVID prevention measures. Exercise khris 2-3 FOS.  H/O bipolar disorder -denies depressed mood or suicidal ideas. is slightly agitated and reluctant to answer questions " I came from work and I am tired "   Anesthesia Alert  NO--Difficult Airway  NO--History of neck surgery or radiation  NO--Limited ROM of neck  NO--History of Malignant hyperthermia  NO--Personal or family history of Pseudocholinesterase deficiency  NO--Prior Anesthesia Complication  NO--Latex Allergy  NO--Loose teeth  NO--History of Rheumatoid Arthritis  NO--BOBBY  No Bleeding risk  NO--Other_____

## 2023-07-29 DIAGNOSIS — D36.9 BENIGN NEOPLASM, UNSPECIFIED SITE: ICD-10-CM

## 2023-08-01 PROBLEM — Z87.2 PERSONAL HISTORY OF DISEASES OF THE SKIN AND SUBCUTANEOUS TISSUE: Chronic | Status: ACTIVE | Noted: 2023-07-28

## 2023-08-10 ENCOUNTER — RESULT REVIEW (OUTPATIENT)
Age: 38
End: 2023-08-10

## 2023-08-11 ENCOUNTER — RESULT REVIEW (OUTPATIENT)
Age: 38
End: 2023-08-11

## 2023-08-11 ENCOUNTER — TRANSCRIPTION ENCOUNTER (OUTPATIENT)
Age: 38
End: 2023-08-11

## 2023-08-11 ENCOUNTER — APPOINTMENT (OUTPATIENT)
Dept: OBGYN | Facility: HOSPITAL | Age: 38
End: 2023-08-11

## 2023-08-11 ENCOUNTER — APPOINTMENT (OUTPATIENT)
Dept: PSYCHIATRY | Facility: CLINIC | Age: 38
End: 2023-08-11

## 2023-08-11 ENCOUNTER — OUTPATIENT (OUTPATIENT)
Dept: INPATIENT UNIT | Facility: HOSPITAL | Age: 38
LOS: 1 days | Discharge: HOME | End: 2023-08-11
Payer: MEDICAID

## 2023-08-11 VITALS
DIASTOLIC BLOOD PRESSURE: 74 MMHG | HEART RATE: 67 BPM | RESPIRATION RATE: 20 BRPM | OXYGEN SATURATION: 98 % | SYSTOLIC BLOOD PRESSURE: 102 MMHG

## 2023-08-11 VITALS
HEART RATE: 67 BPM | SYSTOLIC BLOOD PRESSURE: 122 MMHG | DIASTOLIC BLOOD PRESSURE: 83 MMHG | TEMPERATURE: 98 F | OXYGEN SATURATION: 98 % | RESPIRATION RATE: 17 BRPM | WEIGHT: 169.98 LBS | HEIGHT: 65 IN

## 2023-08-11 DIAGNOSIS — D36.9 BENIGN NEOPLASM, UNSPECIFIED SITE: ICD-10-CM

## 2023-08-11 LAB
ABO RH CONFIRMATION: SIGNIFICANT CHANGE UP
BLD GP AB SCN SERPL QL: SIGNIFICANT CHANGE UP

## 2023-08-11 PROCEDURE — 86850 RBC ANTIBODY SCREEN: CPT

## 2023-08-11 PROCEDURE — 36415 COLL VENOUS BLD VENIPUNCTURE: CPT

## 2023-08-11 PROCEDURE — 88305 TISSUE EXAM BY PATHOLOGIST: CPT | Mod: 26

## 2023-08-11 PROCEDURE — C9399: CPT

## 2023-08-11 PROCEDURE — 86900 BLOOD TYPING SEROLOGIC ABO: CPT

## 2023-08-11 PROCEDURE — 88112 CYTOPATH CELL ENHANCE TECH: CPT | Mod: 26

## 2023-08-11 PROCEDURE — 88305 TISSUE EXAM BY PATHOLOGIST: CPT

## 2023-08-11 PROCEDURE — 86901 BLOOD TYPING SEROLOGIC RH(D): CPT

## 2023-08-11 PROCEDURE — 58661 LAPAROSCOPY REMOVE ADNEXA: CPT

## 2023-08-11 PROCEDURE — S2900: CPT

## 2023-08-11 PROCEDURE — 88112 CYTOPATH CELL ENHANCE TECH: CPT

## 2023-08-11 RX ORDER — HYDROMORPHONE HYDROCHLORIDE 2 MG/ML
1 INJECTION INTRAMUSCULAR; INTRAVENOUS; SUBCUTANEOUS
Refills: 0 | Status: DISCONTINUED | OUTPATIENT
Start: 2023-08-11 | End: 2023-08-11

## 2023-08-11 RX ORDER — IBUPROFEN 200 MG
3 TABLET ORAL
Qty: 60 | Refills: 0
Start: 2023-08-11 | End: 2023-08-15

## 2023-08-11 RX ORDER — SODIUM CHLORIDE 9 MG/ML
1000 INJECTION, SOLUTION INTRAVENOUS
Refills: 0 | Status: DISCONTINUED | OUTPATIENT
Start: 2023-08-11 | End: 2023-08-11

## 2023-08-11 RX ORDER — HYDROMORPHONE HYDROCHLORIDE 2 MG/ML
0.5 INJECTION INTRAMUSCULAR; INTRAVENOUS; SUBCUTANEOUS
Refills: 0 | Status: DISCONTINUED | OUTPATIENT
Start: 2023-08-11 | End: 2023-08-11

## 2023-08-11 RX ORDER — ONDANSETRON 8 MG/1
4 TABLET, FILM COATED ORAL ONCE
Refills: 0 | Status: DISCONTINUED | OUTPATIENT
Start: 2023-08-11 | End: 2023-08-11

## 2023-08-11 RX ORDER — SPIRONOLACTONE 25 MG/1
1 TABLET, FILM COATED ORAL
Refills: 0 | DISCHARGE

## 2023-08-11 RX ORDER — LITHIUM CARBONATE 300 MG/1
3 TABLET, EXTENDED RELEASE ORAL
Refills: 0 | DISCHARGE

## 2023-08-11 RX ORDER — MEPERIDINE HYDROCHLORIDE 50 MG/ML
12.5 INJECTION INTRAMUSCULAR; INTRAVENOUS; SUBCUTANEOUS
Refills: 0 | Status: DISCONTINUED | OUTPATIENT
Start: 2023-08-11 | End: 2023-08-11

## 2023-08-11 RX ORDER — ACETAMINOPHEN 500 MG
4 TABLET ORAL
Qty: 80 | Refills: 0
Start: 2023-08-11 | End: 2023-08-15

## 2023-08-11 NOTE — ASU DISCHARGE PLAN (ADULT/PEDIATRIC) - CARE PROVIDER_API CALL
Abram Tiwari  Obstetrics and Gynecology  [facility] [address]  Phone: [phone]  Fax: [phone]  Follow Up Time: 2 weeks

## 2023-08-11 NOTE — ASU DISCHARGE PLAN (ADULT/PEDIATRIC) - SIGNS AND SYMPTOMS OF INFECTION: FEVER, REDNESS, SWELLING, FOUL SMELLING DISCHARGE
Pt was playing with 8-year-old family member and now patient will not move R arm. No obvious swelling, no obvious tenderness.
Statement Selected

## 2023-08-11 NOTE — BRIEF OPERATIVE NOTE - SPECIMENS
right ovarian mass and right fallopian tube right ovarian mass and right fallopian tube, peritoneal washings

## 2023-08-11 NOTE — ASU DISCHARGE PLAN (ADULT/PEDIATRIC) - CALL YOUR DOCTOR IF YOU HAVE ANY OF THE FOLLOWING:
Bleeding that does not stop/Fever greater than (need to indicate Fahrenheit or Celsius)/Wound/Surgical Site with redness, or foul smelling discharge or pus/Increased irritability or sluggishness

## 2023-08-11 NOTE — CHART NOTE - NSCHARTNOTEFT_GEN_A_CORE
PACU ANESTHESIA ADMISSION NOTE      Procedure: Robot-assisted salpingectomy or oophorectomy using da Derrick Si  right salpingo-oophorectomy      Post op diagnosis:  Mass, ovarian        ____  Intubated  TV:______       Rate: ______      FiO2: ______    _x___  Patent Airway    _x___  Full return of protective reflexes    _x___  Full recovery from anesthesia / back to baseline status    Vitals:    See anesthesia record      Mental Status:  ____ Awake   _____ Alert   __x___ Drowsy   _____ Sedated    Nausea/Vomiting:  _x___  NO       ______Yes,   See Post - Op Orders         Pain Scale (0-10):  __0___    Treatment: _x___ None    ____ See Post - Op/PCA Orders    Post - Operative Fluids:   __x__ Oral   ____ See Post - Op Orders    Plan: Discharge:   _x___Home       _____Floor     _____Critical Care    _____  Other:_________________    Comments:  No anesthesia issues or complications noted.  Discharge when criteria met.

## 2023-08-11 NOTE — BRIEF OPERATIVE NOTE - NSICDXBRIEFPROCEDURE_GEN_ALL_CORE_FT
PROCEDURES:  Robot-assisted salpingectomy or oophorectomy using da Derrick Si 11-Aug-2023 18:15:44 right salpingo-oophorectomy Danielle Guzman

## 2023-08-11 NOTE — BRIEF OPERATIVE NOTE - OPERATION/FINDINGS
On laparoscopy, large 10cm right ovarian cystic mass visualized. Fibroid uterus with intramural fibroid noted in posterior wall. Normal left and right fallopian tube. Normal left fallopian tube. Right ovarian mass removed within endo-catch bag and ruptured extra-corporeally with fat, mucus, and hair noted. On laparoscopy, large 10cm right ovarian cystic mass visualized. Fibroid uterus with intramural fibroid noted in posterior wall. Normal left and right fallopian tube. Normal left ovary. Right ovarian mass removed within endo-catch bag and ruptured extra-corporeally with fat, mucus, and hair noted, cyst was multiloculated.

## 2023-08-13 ENCOUNTER — NON-APPOINTMENT (OUTPATIENT)
Age: 38
End: 2023-08-13

## 2023-08-15 LAB — NON-GYNECOLOGICAL CYTOLOGY STUDY: SIGNIFICANT CHANGE UP

## 2023-08-16 LAB — SURGICAL PATHOLOGY STUDY: SIGNIFICANT CHANGE UP

## 2023-08-17 DIAGNOSIS — N83.9 NONINFLAMMATORY DISORDER OF OVARY, FALLOPIAN TUBE AND BROAD LIGAMENT, UNSPECIFIED: ICD-10-CM

## 2023-08-17 DIAGNOSIS — F31.9 BIPOLAR DISORDER, UNSPECIFIED: ICD-10-CM

## 2023-08-17 DIAGNOSIS — Z88.0 ALLERGY STATUS TO PENICILLIN: ICD-10-CM

## 2023-08-17 DIAGNOSIS — D27.0 BENIGN NEOPLASM OF RIGHT OVARY: ICD-10-CM

## 2023-08-18 ENCOUNTER — OUTPATIENT (OUTPATIENT)
Dept: OUTPATIENT SERVICES | Facility: HOSPITAL | Age: 38
LOS: 1 days | End: 2023-08-18
Payer: MEDICAID

## 2023-08-18 ENCOUNTER — APPOINTMENT (OUTPATIENT)
Dept: PSYCHIATRY | Facility: CLINIC | Age: 38
End: 2023-08-18
Payer: MEDICAID

## 2023-08-18 DIAGNOSIS — F31.9 BIPOLAR DISORDER, UNSPECIFIED: ICD-10-CM

## 2023-08-18 PROCEDURE — 99213 OFFICE O/P EST LOW 20 MIN: CPT | Mod: 95

## 2023-08-18 NOTE — PLAN
[Medication education provided] : Medication education provided. [Rationale for medication choices, possible risks/precautions, benefits, alternative treatment choices, and consequences of non-treatment discussed] : Rationale for medication choices, possible risks/precautions, benefits, alternative treatment choices, and consequences of non-treatment discussed with patient/family/caregiver  [FreeTextEntry4] : Medication management to maintain euthymia and prevent manic and depressive episodes over time. [FreeTextEntry5] : Continue Lithium at 900mg daily Recheck labs RTC 6 weeks
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
## 2023-08-19 DIAGNOSIS — F31.9 BIPOLAR DISORDER, UNSPECIFIED: ICD-10-CM

## 2023-08-23 ENCOUNTER — APPOINTMENT (OUTPATIENT)
Dept: OBGYN | Facility: CLINIC | Age: 38
End: 2023-08-23
Payer: MEDICAID

## 2023-08-23 VITALS
HEART RATE: 83 BPM | SYSTOLIC BLOOD PRESSURE: 118 MMHG | HEIGHT: 65 IN | WEIGHT: 170 LBS | DIASTOLIC BLOOD PRESSURE: 83 MMHG | BODY MASS INDEX: 28.32 KG/M2

## 2023-08-23 DIAGNOSIS — R21 RASH AND OTHER NONSPECIFIC SKIN ERUPTION: ICD-10-CM

## 2023-08-23 PROCEDURE — 99024 POSTOP FOLLOW-UP VISIT: CPT

## 2023-08-23 NOTE — HISTORY OF PRESENT ILLNESS
[Pain is well-controlled] : pain is well-controlled [Fever] : no fever [Chills] : no chills [Nausea] : no nausea [Vomiting] : no vomiting [Clean/Dry/Intact] : clean, dry and intact [Erythema] : erythematous [None] : no vaginal bleeding [Normal] : normal [Pathology reviewed] : pathology reviewed

## 2023-09-01 ENCOUNTER — OUTPATIENT (OUTPATIENT)
Dept: OUTPATIENT SERVICES | Facility: HOSPITAL | Age: 38
LOS: 1 days | End: 2023-09-01
Payer: MEDICAID

## 2023-09-01 DIAGNOSIS — F31.9 BIPOLAR DISORDER, UNSPECIFIED: ICD-10-CM

## 2023-09-01 PROCEDURE — 84146 ASSAY OF PROLACTIN: CPT

## 2023-09-01 PROCEDURE — 80178 ASSAY OF LITHIUM: CPT

## 2023-09-01 PROCEDURE — 84443 ASSAY THYROID STIM HORMONE: CPT

## 2023-09-01 PROCEDURE — 80053 COMPREHEN METABOLIC PANEL: CPT

## 2023-09-02 DIAGNOSIS — F31.9 BIPOLAR DISORDER, UNSPECIFIED: ICD-10-CM

## 2023-09-05 LAB
ALBUMIN SERPL ELPH-MCNC: 4.7 G/DL
ALP BLD-CCNC: 72 U/L
ALT SERPL-CCNC: 14 U/L
ANION GAP SERPL CALC-SCNC: 12 MMOL/L
AST SERPL-CCNC: 17 U/L
BILIRUB SERPL-MCNC: <0.2 MG/DL
BUN SERPL-MCNC: 25 MG/DL
CALCIUM SERPL-MCNC: 10 MG/DL
CHLORIDE SERPL-SCNC: 103 MMOL/L
CO2 SERPL-SCNC: 22 MMOL/L
CREAT SERPL-MCNC: 0.8 MG/DL
EGFR: 95 ML/MIN/1.73M2
GLUCOSE SERPL-MCNC: 88 MG/DL
LITHIUM SERPL-SCNC: 0.66 MMOL/L
POTASSIUM SERPL-SCNC: 4.8 MMOL/L
PROLACTIN SERPL-MCNC: 30.5 NG/ML
PROT SERPL-MCNC: 8.1 G/DL
SODIUM SERPL-SCNC: 137 MMOL/L
TSH SERPL-ACNC: 3.53 UIU/ML

## 2023-09-19 ENCOUNTER — APPOINTMENT (OUTPATIENT)
Dept: OBGYN | Facility: CLINIC | Age: 38
End: 2023-09-19
Payer: MEDICAID

## 2023-09-19 VITALS
SYSTOLIC BLOOD PRESSURE: 115 MMHG | WEIGHT: 170 LBS | HEIGHT: 65 IN | DIASTOLIC BLOOD PRESSURE: 82 MMHG | BODY MASS INDEX: 28.32 KG/M2 | HEART RATE: 60 BPM

## 2023-09-19 DIAGNOSIS — Z98.890 OTHER SPECIFIED POSTPROCEDURAL STATES: ICD-10-CM

## 2023-09-19 PROCEDURE — 99212 OFFICE O/P EST SF 10 MIN: CPT

## 2023-09-20 DIAGNOSIS — F31.9 BIPOLAR DISORDER, UNSPECIFIED: ICD-10-CM

## 2023-09-21 DIAGNOSIS — F31.9 BIPOLAR DISORDER, UNSPECIFIED: ICD-10-CM

## 2023-09-22 DIAGNOSIS — F31.9 BIPOLAR DISORDER, UNSPECIFIED: ICD-10-CM

## 2023-10-03 ENCOUNTER — APPOINTMENT (OUTPATIENT)
Dept: ENDOCRINOLOGY | Facility: CLINIC | Age: 38
End: 2023-10-03
Payer: MEDICAID

## 2023-10-03 VITALS
OXYGEN SATURATION: 98 % | HEART RATE: 62 BPM | DIASTOLIC BLOOD PRESSURE: 72 MMHG | WEIGHT: 170 LBS | HEIGHT: 65 IN | BODY MASS INDEX: 28.32 KG/M2 | SYSTOLIC BLOOD PRESSURE: 118 MMHG

## 2023-10-03 DIAGNOSIS — N92.6 IRREGULAR MENSTRUATION, UNSPECIFIED: ICD-10-CM

## 2023-10-03 PROCEDURE — 99204 OFFICE O/P NEW MOD 45 MIN: CPT

## 2023-10-06 ENCOUNTER — APPOINTMENT (OUTPATIENT)
Dept: PSYCHIATRY | Facility: CLINIC | Age: 38
End: 2023-10-06
Payer: MEDICAID

## 2023-10-06 ENCOUNTER — OUTPATIENT (OUTPATIENT)
Dept: OUTPATIENT SERVICES | Facility: HOSPITAL | Age: 38
LOS: 1 days | End: 2023-10-06
Payer: MEDICAID

## 2023-10-06 DIAGNOSIS — F31.9 BIPOLAR DISORDER, UNSPECIFIED: ICD-10-CM

## 2023-10-06 PROCEDURE — 99213 OFFICE O/P EST LOW 20 MIN: CPT | Mod: 95

## 2023-10-07 DIAGNOSIS — F31.9 BIPOLAR DISORDER, UNSPECIFIED: ICD-10-CM

## 2023-10-17 ENCOUNTER — APPOINTMENT (OUTPATIENT)
Dept: OBGYN | Facility: CLINIC | Age: 38
End: 2023-10-17
Payer: MEDICAID

## 2023-10-17 VITALS
DIASTOLIC BLOOD PRESSURE: 90 MMHG | WEIGHT: 165 LBS | HEIGHT: 65 IN | SYSTOLIC BLOOD PRESSURE: 130 MMHG | BODY MASS INDEX: 27.49 KG/M2 | HEART RATE: 72 BPM | TEMPERATURE: 97.6 F

## 2023-10-17 DIAGNOSIS — Z01.818 ENCOUNTER FOR OTHER PREPROCEDURAL EXAMINATION: ICD-10-CM

## 2023-10-17 DIAGNOSIS — Z30.430 ENCOUNTER FOR INSERTION OF INTRAUTERINE CONTRACEPTIVE DEVICE: ICD-10-CM

## 2023-10-17 LAB
HCG UR QL: NEGATIVE
QUALITY CONTROL: YES

## 2023-10-17 PROCEDURE — 81025 URINE PREGNANCY TEST: CPT

## 2023-10-17 RX ORDER — CLOTRIMAZOLE AND BETAMETHASONE DIPROPIONATE 10; .5 MG/G; MG/G
1-0.05 CREAM TOPICAL TWICE DAILY
Qty: 1 | Refills: 3 | Status: COMPLETED | COMMUNITY
Start: 2023-08-23 | End: 2023-10-17

## 2023-10-18 ENCOUNTER — NON-APPOINTMENT (OUTPATIENT)
Age: 38
End: 2023-10-18

## 2023-11-14 ENCOUNTER — RESULT REVIEW (OUTPATIENT)
Age: 38
End: 2023-11-14

## 2023-11-14 ENCOUNTER — OUTPATIENT (OUTPATIENT)
Dept: OUTPATIENT SERVICES | Facility: HOSPITAL | Age: 38
LOS: 1 days | End: 2023-11-14
Payer: COMMERCIAL

## 2023-11-14 DIAGNOSIS — R92.2 INCONCLUSIVE MAMMOGRAM: ICD-10-CM

## 2023-11-14 PROCEDURE — 76642 ULTRASOUND BREAST LIMITED: CPT | Mod: 26,RT

## 2023-11-14 PROCEDURE — 76642 ULTRASOUND BREAST LIMITED: CPT | Mod: RT

## 2023-11-15 DIAGNOSIS — R92.2 INCONCLUSIVE MAMMOGRAM: ICD-10-CM

## 2023-11-17 ENCOUNTER — NON-APPOINTMENT (OUTPATIENT)
Age: 38
End: 2023-11-17

## 2023-12-08 ENCOUNTER — APPOINTMENT (OUTPATIENT)
Dept: PSYCHIATRY | Facility: CLINIC | Age: 38
End: 2023-12-08
Payer: COMMERCIAL

## 2023-12-08 ENCOUNTER — OUTPATIENT (OUTPATIENT)
Dept: OUTPATIENT SERVICES | Facility: HOSPITAL | Age: 38
LOS: 1 days | End: 2023-12-08
Payer: COMMERCIAL

## 2023-12-08 DIAGNOSIS — F31.9 BIPOLAR DISORDER, UNSPECIFIED: ICD-10-CM

## 2023-12-08 PROCEDURE — 99213 OFFICE O/P EST LOW 20 MIN: CPT | Mod: 95

## 2023-12-09 DIAGNOSIS — F31.9 BIPOLAR DISORDER, UNSPECIFIED: ICD-10-CM

## 2023-12-11 ENCOUNTER — APPOINTMENT (OUTPATIENT)
Dept: OBGYN | Facility: CLINIC | Age: 38
End: 2023-12-11
Payer: COMMERCIAL

## 2023-12-11 VITALS
DIASTOLIC BLOOD PRESSURE: 85 MMHG | HEART RATE: 65 BPM | WEIGHT: 165 LBS | SYSTOLIC BLOOD PRESSURE: 142 MMHG | HEIGHT: 65 IN | BODY MASS INDEX: 27.49 KG/M2

## 2023-12-11 DIAGNOSIS — Z97.5 PRESENCE OF (INTRAUTERINE) CONTRACEPTIVE DEVICE: ICD-10-CM

## 2023-12-11 PROCEDURE — 76830 TRANSVAGINAL US NON-OB: CPT

## 2023-12-24 NOTE — ED PROVIDER NOTE - NSCAREINITIATED _GEN_ER
Chief Complaint   Patient presents with    Office Visit     ER 12/7/2023 with right thumb injury. Thumb crushed at work.  Pain tolerable with OTC pain meds, wearing the thumb spice brace provided by ER       HISTORY OF PRESENT ILLNESS:  Suman De Luna is a 26 year old male presents for evaluation of right thumb work injury.  Seen in ER 12/7/2023.  Turning a torque wrench when the screw came loose and patient's arm was crushed between the wrench and a piece of metal.  Right-hand dominant.  Also complaining of pain right long finger.  Placed in thumb spica splint.    There is no previous medical history on file.      REVIEW OF SYSTEMS:  General review of systems is unchanged except for pertinent positives as per history of present illness.    MEDS:    No current outpatient medications on file.     No current facility-administered medications for this visit.        PHYSICAL EXAM:  Alert oriented cooperative.  Splint removed.  Tender proximal phalanx right thumb.  Swelling present.  IP motion intact.  Sensation intact.  Radiographs demonstrate comminuted fracture of the right thumb proximal phalanx without displacement.  displacement.       slight swelling and tenderness volar aspect PIP joint right long finger.  Minimal limitation finger motion.  Sensation intact distally.  Radiographs demonstrate undisplaced fracture base proximal phalanx right long finger.  There is extension into the intra-articular PIP joint.  No displacement     ASSESSMENT:      1. Closed nondisplaced fracture of proximal phalanx of right thumb, initial encounter    2. Closed avulsion fracture of middle phalanx of finger, initial encounter        PLAN:  Continue with splinting.  Work restrictions:RESTRICTIONS: No lifting over 10lbs with right upper extremity.  Review work restrictions at follow up in 1 month.     Orders Placed This Encounter    CLOSE RX FINGR ARTICULAR FX    CLOSE RX PROX MID FINGER SHFT FX     Instructions provided as documented in  the AVS.    The patient and/or significant other indicated understanding of the diagnosis and agreed with the plan of care.      Mireille Fan)

## 2024-01-02 ENCOUNTER — APPOINTMENT (OUTPATIENT)
Dept: BREAST CENTER | Facility: CLINIC | Age: 39
End: 2024-01-02
Payer: COMMERCIAL

## 2024-01-02 VITALS
BODY MASS INDEX: 27.49 KG/M2 | WEIGHT: 165 LBS | SYSTOLIC BLOOD PRESSURE: 123 MMHG | DIASTOLIC BLOOD PRESSURE: 86 MMHG | HEIGHT: 65 IN

## 2024-01-02 DIAGNOSIS — R92.30 DENSE BREASTS, UNSPECIFIED: ICD-10-CM

## 2024-01-02 DIAGNOSIS — N63.10 UNSPECIFIED LUMP IN THE RIGHT BREAST, UNSPECIFIED QUADRANT: ICD-10-CM

## 2024-01-02 DIAGNOSIS — R92.8 OTHER ABNORMAL AND INCONCLUSIVE FINDINGS ON DIAGNOSTIC IMAGING OF BREAST: ICD-10-CM

## 2024-01-02 PROCEDURE — 99204 OFFICE O/P NEW MOD 45 MIN: CPT

## 2024-01-02 NOTE — PHYSICAL EXAM
[Normocephalic] : normocephalic [EOMI] : extra ocular movement intact [No Supraclavicular Adenopathy] : no supraclavicular adenopathy [No Cervical Adenopathy] : no cervical adenopathy [Examined in the supine and seated position] : examined in the supine and seated position [No dominant masses] : no dominant masses in right breast  [No dominant masses] : no dominant masses left breast [No Nipple Retraction] : no left nipple retraction [No Nipple Discharge] : no left nipple discharge [No Axillary Lymphadenopathy] : no left axillary lymphadenopathy [No Rashes] : no rashes [No Ulceration] : no ulceration [de-identified] : Nl respirations

## 2024-01-02 NOTE — PAST MEDICAL HISTORY
[Menarche Age ____] : age at menarche was [unfilled] [Total Preg ___] : G[unfilled] [FreeTextEntry6] : Denies [FreeTextEntry7] : Yes [FreeTextEntry8] : None

## 2024-01-02 NOTE — HISTORY OF PRESENT ILLNESS
[FreeTextEntry1] : Mariam is 38 year old female here for evaluation of BIRADS3 abnormality.   FHx: PGF with lung cancer  Her imaging is as follows: 03/27/2023   b/l sc[facility] mammo --> BIRADS 1 breasts are heterogeneously dense, No mammographic evidence of malignancy.  05/13/2023: B US --> BIRADS 3 RIGHT US -@ 11-12 N8 there is an oval circumscribed mass measuring 1.2 x 0.6 x 1.7 cm--> Short interval follow-up  11/14/2023 R US--> BIRADS 3 -@ 11 to 12N8  stable circumscribed mass measuring 1.7 x 1.2 x 0.6 cm, probably benign. Recommendation: Follow-up bilateral diagnostic mammogram and ultrasound in 6 months.  Denies any current complaints. No new changes to her breasts.

## 2024-01-02 NOTE — DATA REVIEWED
[FreeTextEntry1] : CC: [id number] EXAM:  MG MAMMO SCREEN W MANUELITO BI#   ORDERED BY: DONI BRUSH   03/27/2023   INTERPRETATION:  HISTORY: Bilateral MG MAMMO SCREEN W MANUELITO BI# was performed. Patient is 38 years  old and is seen for screening. The patient has no personal history of  cancer.  The patient has no family history of breast cancer.  RISK ASSESSMENT: NCI Lifetime Risk: 11.1 [facility]: 11.6  CLINICAL BREAST EXAM: The patient reports they do not know when their last clinical breast exam  was performed.  COMPARISON STUDIES: This is a baseline study.  MAMMOGRAM FINDINGS: Mammography was performed including the following views: bilateral  craniocaudal with tomosynthesis, bilateral mediolateral oblique with  tomosynthesis.  The examination includes digital synthetic 2D and digital  tomosynthesis 3D images. Additional imaging analysis was performed using  CAD (computer-aided detection) software.  The breasts are heterogeneously dense, which may obscure small masses.  No suspicious mass, grouping of calcifications, or other abnormality is  identified.  IMPRESSION: No mammographic evidence of malignancy.  RECOMMENDATION: Unless otherwise indicated by clinical findings, annual screening  mammography recommended.  ASSESSMENT: BI-RADS Category 1:  Negative C: [id number]     EXAM:  MG US BREAST COMPLETE BI   ORDERED BY: LILY DESIR  PROCEDURE DATE:  05/13/2023    INTERPRETATION:  Clinical History / Reason for exam: Screening bilateral breast ultrasound.  The patient reports last clinical breast examination was performed about: Within the past year.  Family history of breast cancer: There is no family history of breast cancer.  Comparisons: No prior breast ultrasound.  Breast composition: The breasts are heterogeneously dense, which may obscure small masses.  Findings:  Ultrasound:  Bilateral whole breast ultrasound was performed. At 11-12 o'clock N8 there is an oval circumscribed mass measuring 1.2 x 0.6 x 1.7 cm. Short interval follow-up recommended. No solid or cystic mass noted in either breast. No right or left axillary lymphadenopathy.  Impression: Right breast 11-12 o'clock oval circumscribed mass as above. Short interval follow-up recommended. No sonographic evidence of malignancy in the left breast.  Recommendation: Follow-up breast ultrasound in 6 months.  BI-RADS category 3: Probably Benign C: [id number]     EXAM:  MG US BREAST LIMITED RT   ORDERED BY: ALY CANO  PROCEDURE DATE:  11/14/2023    INTERPRETATION:  Clinical History / Reason for exam: Short interval follow-up of a probably benign right breast mass, first identified in 2023.  The patient reports her last clinical breast examination was performed and an unspecified.  Family history: None  Comparisons: Priors dated May 2023..  Findings:  Ultrasound:  Targeted unilateral right breast ultrasound was performed.  At the 11 to 12:00 position 8 cm from the nipple, there is a stable circumscribed mass measuring 1.7 x 1.2 x 0.6 cm, probably benign. Continued follow-up is recommended.  Impression: Stable probably benign right breast 11 to 12:00 position mass for which short-term sonographic follow-up is recommended.  Recommendation: Follow-up bilateral diagnostic mammogram and ultrasound in 6 months.  BI-RADS category 3: Probably Benign

## 2024-01-02 NOTE — ASSESSMENT
[FreeTextEntry1] : Patient is a 38F with right BIRADS 3 findings.  She underwent bilateral scg mmg in 3/2023 that was BIRADS 1 and then bilateral US in 5/2023 which showed a 11-12N8 oval circumscribed mass measuring 1.7cm, recommended for short term follow up (BIRADS 3)  She then had a right US in 11/2023 which showed the stable right breast mass, probably benign with bilateral imaging recommended in 6 months (BIRADS 3).  I had a lengthy discussion with this patient regarding diagnostic results, impressions, recommendations, risks and benefits.  I explained to the patient the BIRADS system of grading and that her findings fall into category 3. Category 3 carries a less than 5% risk of malignancy. She can choose to follow up imaging. She is also aware that she can also choose to biopsy the lesion if she wishes to.  She understands her options and wants to proceed with short term follow up with imaging.  We also discussed breast density. Increasing breast density has been found to increase ones risk of breast cancer, but at this time, there is no clear indication for additional imaging in this setting, as both US and MRI have not been found to improve survival. One can consider bilateral screening US. However, out of 1000 women screened, the use of routine US will only identify an additional 3-5 cancers. The use of US was found to increase the likelihood of undergoing more imaging and more biopsies. She does have dense breasts. We have decided to proceed with screening bilateral breast US in the future with her screening mammograms.  We discussed her screening mmg would be due at the end of 3/2024 but she wishes to proceed with bilateral imaging in 6 months in 5/2024.  All questions and concerns were answered in detail.  Patient is for bilateral mmg/us in 5/2024.  She is to follow up after imaging, pending any interval changes.  Total time spent on encounter was greater than 45 minutes, which included face to face time with the patient, performing an exam, reviewing previous medical records including imaging/ pathology, documenting in patient record and coordinating care/imaging. Greater than 50% of the encounter was spent on counseling and coordination of her breast issue.

## 2024-01-30 ENCOUNTER — OUTPATIENT (OUTPATIENT)
Dept: OUTPATIENT SERVICES | Facility: HOSPITAL | Age: 39
LOS: 1 days | End: 2024-01-30
Payer: COMMERCIAL

## 2024-01-30 LAB
HCT VFR BLD CALC: 42.2 %
HGB BLD-MCNC: 13.5 G/DL
LITHIUM SERPL-SCNC: 0.48 MMOL/L
MCHC RBC-ENTMCNC: 29.6 PG
MCHC RBC-ENTMCNC: 32 G/DL
MCV RBC AUTO: 92.5 FL
PLATELET # BLD AUTO: 365 K/UL
PMV BLD: 10.4 FL
RBC # BLD: 4.56 M/UL
RBC # FLD: 12.9 %
WBC # FLD AUTO: 8.52 K/UL

## 2024-01-30 PROCEDURE — 84146 ASSAY OF PROLACTIN: CPT

## 2024-01-30 PROCEDURE — 80178 ASSAY OF LITHIUM: CPT

## 2024-01-30 PROCEDURE — 85027 COMPLETE CBC AUTOMATED: CPT

## 2024-01-31 LAB
ALBUMIN SERPL ELPH-MCNC: 4.4 G/DL
ALP BLD-CCNC: 61 U/L
ALT SERPL-CCNC: 12 U/L
ANION GAP SERPL CALC-SCNC: 12 MMOL/L
AST SERPL-CCNC: 18 U/L
BILIRUB SERPL-MCNC: 0.4 MG/DL
BUN SERPL-MCNC: 20 MG/DL
CALCIUM SERPL-MCNC: 9.7 MG/DL
CHLORIDE SERPL-SCNC: 103 MMOL/L
CHOLEST SERPL-MCNC: 218 MG/DL
CO2 SERPL-SCNC: 24 MMOL/L
CORTIS SERPL-MCNC: 9.3 UG/DL
CREAT SERPL-MCNC: 0.8 MG/DL
EGFR: 95 ML/MIN/1.73M2
ESTIMATED AVERAGE GLUCOSE: 103 MG/DL
GLUCOSE SERPL-MCNC: 86 MG/DL
HBA1C MFR BLD HPLC: 5.2 %
HDLC SERPL-MCNC: 53 MG/DL
INSULIN SERPL-MCNC: 5 UU/ML
LDLC SERPL CALC-MCNC: 151 MG/DL
NONHDLC SERPL-MCNC: 165 MG/DL
POTASSIUM SERPL-SCNC: 5.1 MMOL/L
PROLACTIN SERPL-MCNC: 34.7 NG/ML
PROLACTIN SERPL-MCNC: 36.2 NG/ML
PROT SERPL-MCNC: 7.5 G/DL
SODIUM SERPL-SCNC: 139 MMOL/L
T4 FREE SERPL-MCNC: 1.4 NG/DL
TRIGL SERPL-MCNC: 72 MG/DL
TSH SERPL-ACNC: 2.7 UIU/ML

## 2024-02-06 LAB
MONOMERIC PROLACTIN (ICMA)*: 36.6 NG/ML
PERCENT MACROPROLACTIN: 13 %
PROLACTIN, SERUM (ICMA)*: 41.9 NG/ML

## 2024-02-09 ENCOUNTER — APPOINTMENT (OUTPATIENT)
Dept: PSYCHIATRY | Facility: CLINIC | Age: 39
End: 2024-02-09
Payer: COMMERCIAL

## 2024-02-09 ENCOUNTER — OUTPATIENT (OUTPATIENT)
Dept: OUTPATIENT SERVICES | Facility: HOSPITAL | Age: 39
LOS: 1 days | End: 2024-02-09
Payer: COMMERCIAL

## 2024-02-09 DIAGNOSIS — F31.9 BIPOLAR DISORDER, UNSPECIFIED: ICD-10-CM

## 2024-02-09 PROCEDURE — 99214 OFFICE O/P EST MOD 30 MIN: CPT | Mod: 95

## 2024-02-10 DIAGNOSIS — F31.9 BIPOLAR DISORDER, UNSPECIFIED: ICD-10-CM

## 2024-02-10 NOTE — DISCUSSION/SUMMARY
[Date of Last HbgA1c: _____] : Date of Last HbgA1c: [unfilled] [Date of Last Lipid Profile: _____] : Date of Last Lipid Profile: [unfilled] [FreeTextEntry1] : Pt with long-standing bipolar illness. Remains stable on Lithium monotherapy.\par   [Date of Last Annual Labs: _____] : Date of Last Annual Labs: [unfilled]

## 2024-02-10 NOTE — PLAN
[Medication education provided] : Medication education provided. [Rationale for medication choices, possible risks/precautions, benefits, alternative treatment choices, and consequences of non-treatment discussed] : Rationale for medication choices, possible risks/precautions, benefits, alternative treatment choices, and consequences of non-treatment discussed with patient/family/caregiver  [FreeTextEntry4] : Medication management to maintain euthymia and prevent manic and depressive episodes over time. [FreeTextEntry5] : Increase Lithium to 600mg bid New level in 2 weeks RTC 1 month

## 2024-02-14 DIAGNOSIS — F31.9 BIPOLAR DISORDER, UNSPECIFIED: ICD-10-CM

## 2024-02-15 ENCOUNTER — APPOINTMENT (OUTPATIENT)
Dept: ENDOCRINOLOGY | Facility: CLINIC | Age: 39
End: 2024-02-15
Payer: COMMERCIAL

## 2024-02-15 VITALS
SYSTOLIC BLOOD PRESSURE: 120 MMHG | OXYGEN SATURATION: 98 % | WEIGHT: 146.7 LBS | BODY MASS INDEX: 24.44 KG/M2 | HEART RATE: 68 BPM | HEIGHT: 65 IN | DIASTOLIC BLOOD PRESSURE: 80 MMHG

## 2024-02-15 DIAGNOSIS — R63.8 OTHER SYMPTOMS AND SIGNS CONCERNING FOOD AND FLUID INTAKE: ICD-10-CM

## 2024-02-15 DIAGNOSIS — D27.9 BENIGN NEOPLASM OF UNSPECIFIED OVARY: ICD-10-CM

## 2024-02-15 DIAGNOSIS — F31.9 BIPOLAR DISORDER, UNSPECIFIED: ICD-10-CM

## 2024-02-15 DIAGNOSIS — R79.89 OTHER SPECIFIED ABNORMAL FINDINGS OF BLOOD CHEMISTRY: ICD-10-CM

## 2024-02-15 PROCEDURE — 99213 OFFICE O/P EST LOW 20 MIN: CPT

## 2024-02-15 NOTE — HISTORY OF PRESENT ILLNESS
[FreeTextEntry1] : 39 year old lady with known bipolar type 1 ,  right salpingo-oophorectomy who present for follow up evaluation of elevated prolactin level and struma ovary.  # elevated prolactin  - noted to be high in 2021 when she had irregular period when she was started on Invega sustain (paliperidone anti dopamine) for bipolar, was taken off and levels are trending down but remain above normal, periods are now regular since she has been off the injections for more then 1 year, No Galactorrhea  - recently she also had right oophorectomy and pathology showed benign struma ovary, no previous thyroid abnormalities  - 2/2023 was started on spironolactone 100 mg daily for acne, remain on it  - 3/2023: had MRI pituitary done was negative  - 1/2024: monomeric prolactin 36.6 , TSH normal now has IUD placed and no galactorrhea  # weight gain  - she also reports difficulty losing weight, no steroid use , no easy bruising or muscle weakness  - 2/2024: now doing keto diet and was able to lose weight .

## 2024-02-15 NOTE — ASSESSMENT
[FreeTextEntry1] : 39 year old lady with known bipolar type 1 , recent right salpingo-oophorectomy who present for follow up  evaluation of elevated prolactin level and struma ovary.  # elevated prolactin  - noted to be high in 2021 when she had irregular period when she was started on Invega sustain (paliperidone anti dopamine) for bipolar, was taken off and levels are trending down but remain above normal, periods are now regular since she has been off the injections for more then 1 year, No Galactorrhea  - recently she also had right oophorectomy and pathology showed benign struma ovary, no previous thyroid abnormalities  - 2/2023 was started on spironolactone 100 mg daily for acne, remain on it  - 3/2023: had MRI pituitary done was negative  - 1/2024: monomeric prolactin 36.6 , TSH normal now has IUD placed and no galactorrhea - will continue to monitor prolactin for now, can monitor every 6-12 months with me as she is followed with psychiatrist too   # struma ovary of right ovary - per pathology benign , tfts normal , on lithium   # weight gain  - she also reports difficulty losing weight , no steroid use , no easy bruising or muscle weakness  - good response to keto diet BMI now 24

## 2024-02-15 NOTE — REVIEW OF SYSTEMS
[Acne] : acne [Hirsutism] : hirsutism [Recent Weight Loss (___ Lbs)] : recent weight loss: [unfilled] lbs [Visual Field Defect] : no visual field defect [Blurred Vision] : no blurred vision [Dysphagia] : no dysphagia [Neck Pain] : no neck pain [Dysphonia] : no dysphonia [Chest Pain] : no chest pain [Palpitations] : no palpitations [Nausea] : no nausea [Constipation] : no constipation [Vomiting] : no vomiting [Diarrhea] : no diarrhea [As Noted in HPI] : as noted in HPI [Headaches] : no headaches [Tremors] : no tremors [Cold Intolerance] : no cold intolerance [Heat Intolerance] : no heat intolerance [Galactorrhea] : no galactorrhea

## 2024-02-15 NOTE — PHYSICAL EXAM
[Alert] : alert [Healthy Appearance] : healthy appearance [No Acute Distress] : no acute distress [No Proptosis] : no proptosis [Thyroid Not Enlarged] : the thyroid was not enlarged [No Respiratory Distress] : no respiratory distress [No Accessory Muscle Use] : no accessory muscle use [Clear to Auscultation] : lungs were clear to auscultation bilaterally [Normal S1, S2] : normal S1 and S2 [No Murmurs] : no murmurs [Regular Rhythm] : with a regular rhythm [No Edema] : no peripheral edema [No CVA Tenderness] : no ~M costovertebral angle tenderness [No Stigmata of Cushings Syndrome] : no stigmata of Cushings Syndrome [Oriented x3] : oriented to person, place, and time [No Lid Lag] : no lid lag [Abdominal Striae] : no abdominal striae [Acne] : no acne [Hirsutism] : no hirsutism [de-identified] : acne improved  [de-identified] : fine tremor

## 2024-02-15 NOTE — DATA REVIEWED
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  MRI pituitary (3/2023) normal pituitary seen in PACS

## 2024-03-05 ENCOUNTER — OUTPATIENT (OUTPATIENT)
Dept: OUTPATIENT SERVICES | Facility: HOSPITAL | Age: 39
LOS: 1 days | End: 2024-03-05
Payer: COMMERCIAL

## 2024-03-05 DIAGNOSIS — F31.9 BIPOLAR DISORDER, UNSPECIFIED: ICD-10-CM

## 2024-03-05 PROCEDURE — 80178 ASSAY OF LITHIUM: CPT

## 2024-03-06 DIAGNOSIS — F31.9 BIPOLAR DISORDER, UNSPECIFIED: ICD-10-CM

## 2024-03-06 LAB — LITHIUM SERPL-SCNC: 0.83 MMOL/L

## 2024-03-08 ENCOUNTER — APPOINTMENT (OUTPATIENT)
Dept: PSYCHIATRY | Facility: CLINIC | Age: 39
End: 2024-03-08
Payer: COMMERCIAL

## 2024-03-08 ENCOUNTER — OUTPATIENT (OUTPATIENT)
Dept: OUTPATIENT SERVICES | Facility: HOSPITAL | Age: 39
LOS: 1 days | End: 2024-03-08
Payer: COMMERCIAL

## 2024-03-08 DIAGNOSIS — F31.9 BIPOLAR DISORDER, UNSPECIFIED: ICD-10-CM

## 2024-03-08 PROCEDURE — 99214 OFFICE O/P EST MOD 30 MIN: CPT | Mod: 95

## 2024-03-09 DIAGNOSIS — F31.9 BIPOLAR DISORDER, UNSPECIFIED: ICD-10-CM
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
## 2024-03-09 NOTE — PLAN
[Medication education provided] : Medication education provided. [Rationale for medication choices, possible risks/precautions, benefits, alternative treatment choices, and consequences of non-treatment discussed] : Rationale for medication choices, possible risks/precautions, benefits, alternative treatment choices, and consequences of non-treatment discussed with patient/family/caregiver  [FreeTextEntry4] : Medication management to maintain euthymia and prevent manic and depressive episodes over time. [FreeTextEntry5] : Continue Lithium at 600mg bid (has refills) RTC 2 months

## 2024-03-09 NOTE — DISCUSSION/SUMMARY
[Date of Last Annual Labs: _____] : Date of Last Annual Labs: [unfilled] [Date of Last HbgA1c: _____] : Date of Last HbgA1c: [unfilled] [Date of Last Lipid Profile: _____] : Date of Last Lipid Profile: [unfilled] [Able to manage surrounding demands and opportunities] : able to manage surrounding demands and opportunities [Plan Review] : Plan Review [Adherent to treatment recommendations] : adherent to treatment recommendations [Insightful] : insightful [Motivated to participate in treatment] : motivated to participate in treatment [Steady employment] : steady employment [Financially stable] : financially stable [FreeTextEntry2] : 3/8/25 [Housing stability] : housing stability [Mental Health] : Mental Health [FreeTextEntry5] : "To stay stable and keep my license" [Continued - Progress made] : Continued - Progress made: [Physical Health] : Physical Health [Initial] : Initial [every ___ months] : every [unfilled] months [Improvement in symptoms as evidenced by:] : Improvement in symptoms as evidenced by: [Yes] : Yes [Psychiatric Provider/Prescriber] : Psychiatric Provider/Prescriber [de-identified] : Compliance with treatment and quarterly reporting have been mandated in recent license renewal. [FreeTextEntry1] : sustained prolactin elevation [de-identified] : Complete diagnostic work-up with PCP. [de-identified] : Previous elevation was thought to be med-induced, but it has persisted. [FreeTextEntry4] : To identify the cause [de-identified] : 6/24/23 [de-identified] : Yet to have MRI [de-identified] : 6/24/23 [de-identified] : Consider treatments - if any recommended [de-identified] : patient report and MSE, lab monitoring to verify med compliance.

## 2024-03-09 NOTE — DISCUSSION/SUMMARY
[Date of Last HbgA1c: _____] : Date of Last HbgA1c: [unfilled] [Date of Last Annual Labs: _____] : Date of Last Annual Labs: [unfilled] [Date of Last Lipid Profile: _____] : Date of Last Lipid Profile: [unfilled] [Plan Review] : Plan Review [Adherent to treatment recommendations] : adherent to treatment recommendations [Able to manage surrounding demands and opportunities] : able to manage surrounding demands and opportunities [Motivated to participate in treatment] : motivated to participate in treatment [Insightful] : insightful [Financially stable] : financially stable [Steady employment] : steady employment [Housing stability] : housing stability [FreeTextEntry2] : 3/8/25 [FreeTextEntry5] : "To stay stable and keep my license" [Mental Health] : Mental Health [Continued - Progress made] : Continued - Progress made: [Initial] : Initial [Physical Health] : Physical Health [every ___ months] : every [unfilled] months [Improvement in symptoms as evidenced by:] : Improvement in symptoms as evidenced by: [Psychiatric Provider/Prescriber] : Psychiatric Provider/Prescriber [Yes] : Yes [FreeTextEntry1] : sustained prolactin elevation [de-identified] : Compliance with treatment and quarterly reporting have been mandated in recent license renewal. [FreeTextEntry4] : To identify the cause [de-identified] : Complete diagnostic work-up with PCP. [de-identified] : 6/24/23 [de-identified] : Previous elevation was thought to be med-induced, but it has persisted. [de-identified] : Yet to have MRI [de-identified] : 6/24/23 [de-identified] : Consider treatments - if any recommended [de-identified] : patient report and MSE, lab monitoring to verify med compliance.
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
## 2024-04-08 ENCOUNTER — APPOINTMENT (OUTPATIENT)
Dept: PSYCHIATRY | Facility: CLINIC | Age: 39
End: 2024-04-08

## 2024-04-17 NOTE — PROGRESS NOTE BEHAVIORAL HEALTH - MOOD
Normal/Other
Irritable/Anxious
Irritable/Anxious/Other
Anxious/Irritable
Irritable
Other/Irritable/Anxious
Anxious/Irritable
Normal
Anxious/Irritable
Irritable/Anxious
Anxious/Irritable
Irritable/Anxious
Anxious/Irritable
Irritable/Anxious
Irritable/Anxious
Anxious/Irritable
Anxious/Irritable/Other
Irritable
Anxious/Irritable
Anxious/Irritable
Irritable/Anxious
Normal/Other
Other/Anxious/Irritable
Anxious/Other/Irritable
Irritable/Anxious
Normal/Other
Other/Normal
Anxious/Irritable
Anxious/Irritable/Other
Other/Anxious/Irritable
Yes
Anxious/Other/Irritable
Irritable/Anxious
Anxious/Irritable
Anxious/Irritable
Irritable/Anxious
Anxious/Other/Irritable
Anxious/Other/Irritable

## 2024-04-19 ENCOUNTER — APPOINTMENT (OUTPATIENT)
Dept: PSYCHIATRY | Facility: CLINIC | Age: 39
End: 2024-04-19
Payer: COMMERCIAL

## 2024-04-19 ENCOUNTER — OUTPATIENT (OUTPATIENT)
Dept: OUTPATIENT SERVICES | Facility: HOSPITAL | Age: 39
LOS: 1 days | End: 2024-04-19
Payer: COMMERCIAL

## 2024-04-19 DIAGNOSIS — F31.9 BIPOLAR DISORDER, UNSPECIFIED: ICD-10-CM

## 2024-04-19 PROCEDURE — 99215 OFFICE O/P EST HI 40 MIN: CPT | Mod: 95

## 2024-04-20 DIAGNOSIS — F31.9 BIPOLAR DISORDER, UNSPECIFIED: ICD-10-CM
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
## 2024-04-22 NOTE — PHYSICAL EXAM
[Full] : full [Pressured] : pressured [FreeTextEntry8] : still a bit elevated [de-identified] : impaired with recent elevation [de-identified] : impaired with recent elevation

## 2024-04-22 NOTE — PLAN
[FreeTextEntry4] : Medication management to maintain euthymia and prevent manic and depressive episodes over time. [FreeTextEntry5] : Continue Lithium at 600mg qAM + 900mg qhs Continue Zyprexa at 15mg qhs Stop Trazodone Start Klonopin 1mg qhs New Lithium level RTC 1 week

## 2024-04-22 NOTE — DISCUSSION/SUMMARY
[FreeTextEntry1] : Pt with long-standing bipolar illness. S/P hospitalization for manic episode despite compliance with Lithium monotherapy.  Improving but not back to baseline.  Will continue Zyprexa for time-being but may need to switch to a more weight neutral option at some point.  Klonopin seems a better choice for persisting sleep issues.

## 2024-04-23 LAB
ALBUMIN SERPL ELPH-MCNC: 4.1 G/DL
ALP BLD-CCNC: 74 U/L
ALT SERPL-CCNC: 18 U/L
ANION GAP SERPL CALC-SCNC: 8 MMOL/L
AST SERPL-CCNC: 24 U/L
BILIRUB SERPL-MCNC: <0.2 MG/DL
BUN SERPL-MCNC: 16 MG/DL
CALCIUM SERPL-MCNC: 9.8 MG/DL
CHLORIDE SERPL-SCNC: 106 MMOL/L
CO2 SERPL-SCNC: 25 MMOL/L
CREAT SERPL-MCNC: 0.74 MG/DL
EGFR: 104 ML/MIN/1.73M2
GLUCOSE SERPL-MCNC: 86 MG/DL
LITHIUM SERPL-SCNC: 0.81 MMOL/L
POTASSIUM SERPL-SCNC: 4.6 MMOL/L
PROT SERPL-MCNC: 7.5 G/DL
SODIUM SERPL-SCNC: 139 MMOL/L

## 2024-04-24 ENCOUNTER — OUTPATIENT (OUTPATIENT)
Dept: OUTPATIENT SERVICES | Facility: HOSPITAL | Age: 39
LOS: 1 days | End: 2024-04-24
Payer: COMMERCIAL

## 2024-04-24 ENCOUNTER — APPOINTMENT (OUTPATIENT)
Dept: PSYCHIATRY | Facility: CLINIC | Age: 39
End: 2024-04-24
Payer: COMMERCIAL

## 2024-04-24 DIAGNOSIS — F33.1 MAJOR DEPRESSIVE DISORDER, RECURRENT, MODERATE: ICD-10-CM

## 2024-04-24 DIAGNOSIS — F31.9 BIPOLAR DISORDER, UNSPECIFIED: ICD-10-CM

## 2024-04-24 PROCEDURE — 99214 OFFICE O/P EST MOD 30 MIN: CPT | Mod: 95

## 2024-04-24 NOTE — PHYSICAL EXAM
[Average] : average [Cooperative] : cooperative [Linear/Goal Directed] : linear/goal directed [WNL] : within normal limits [FreeTextEntry8] : "calmer" [de-identified] : a bit irritable [de-identified] : Less pressured [de-identified] : impaired with recent elevation [de-identified] : impaired with recent elevation

## 2024-04-24 NOTE — PLAN
[Medication education provided] : Medication education provided. [Rationale for medication choices, possible risks/precautions, benefits, alternative treatment choices, and consequences of non-treatment discussed] : Rationale for medication choices, possible risks/precautions, benefits, alternative treatment choices, and consequences of non-treatment discussed with patient/family/caregiver  [FreeTextEntry4] : Medication management to maintain euthymia and prevent manic and depressive episodes over time. [FreeTextEntry5] : Continue Lithium at 600mg qAM + 900mg qhs Continue Zyprexa at 15mg qhs Continue Klonopin at 1mg qhs RTC 1 week

## 2024-04-24 NOTE — DISCUSSION/SUMMARY
[Date of Last Annual Labs: _____] : Date of Last Annual Labs: [unfilled] [Date of Last HbgA1c: _____] : Date of Last HbgA1c: [unfilled] [Date of Last Lipid Profile: _____] : Date of Last Lipid Profile: [unfilled] [FreeTextEntry1] : Pt with long-standing bipolar illness. S/P hospitalization for manic episode.  Appears to be stabilizing.  Sleeping better with Klonopin.
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
## 2024-04-25 DIAGNOSIS — F31.9 BIPOLAR DISORDER, UNSPECIFIED: ICD-10-CM

## 2024-05-06 ENCOUNTER — OUTPATIENT (OUTPATIENT)
Dept: OUTPATIENT SERVICES | Facility: HOSPITAL | Age: 39
LOS: 1 days | End: 2024-05-06
Payer: COMMERCIAL

## 2024-05-06 ENCOUNTER — APPOINTMENT (OUTPATIENT)
Dept: PSYCHIATRY | Facility: CLINIC | Age: 39
End: 2024-05-06
Payer: COMMERCIAL

## 2024-05-06 DIAGNOSIS — F31.9 BIPOLAR DISORDER, UNSPECIFIED: ICD-10-CM

## 2024-05-06 PROCEDURE — 99214 OFFICE O/P EST MOD 30 MIN: CPT | Mod: 95

## 2024-05-06 NOTE — PHYSICAL EXAM
[Average] : average [Cooperative] : cooperative [Linear/Goal Directed] : linear/goal directed [WNL] : within normal limits [Euthymic] : euthymic [Full] : full [Clear] : clear [de-identified] : Less pressured [de-identified] : impaired during recent elevation [de-identified] : impaired during recent elevation

## 2024-05-06 NOTE — PLAN
[Medication education provided] : Medication education provided. [Rationale for medication choices, possible risks/precautions, benefits, alternative treatment choices, and consequences of non-treatment discussed] : Rationale for medication choices, possible risks/precautions, benefits, alternative treatment choices, and consequences of non-treatment discussed with patient/family/caregiver  [FreeTextEntry5] : Continue Lithium at 600mg qAM + 900mg qhs Continue Zyprexa at 15mg qhs Change Klonopin 1mg qhs to prn RTC 2-3 weeks [FreeTextEntry4] : Medication management to maintain euthymia and prevent manic and depressive episodes over time.
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
## 2024-05-06 NOTE — DISCUSSION/SUMMARY
[Date of Last Annual Labs: _____] : Date of Last Annual Labs: [unfilled] [Date of Last HbgA1c: _____] : Date of Last HbgA1c: [unfilled] [Date of Last Lipid Profile: _____] : Date of Last Lipid Profile: [unfilled] [FreeTextEntry1] : Pt with long-standing bipolar illness. S/P hospitalization for manic episode.  Continues to stabilize.

## 2024-05-07 DIAGNOSIS — F31.9 BIPOLAR DISORDER, UNSPECIFIED: ICD-10-CM

## 2024-05-13 ENCOUNTER — APPOINTMENT (OUTPATIENT)
Dept: OBGYN | Facility: CLINIC | Age: 39
End: 2024-05-13
Payer: COMMERCIAL

## 2024-05-13 ENCOUNTER — LABORATORY RESULT (OUTPATIENT)
Age: 39
End: 2024-05-13

## 2024-05-13 VITALS
DIASTOLIC BLOOD PRESSURE: 72 MMHG | SYSTOLIC BLOOD PRESSURE: 106 MMHG | HEART RATE: 69 BPM | BODY MASS INDEX: 25.33 KG/M2 | HEIGHT: 65 IN | WEIGHT: 152 LBS

## 2024-05-13 DIAGNOSIS — R92.30 DENSE BREASTS, UNSPECIFIED: ICD-10-CM

## 2024-05-13 DIAGNOSIS — Z01.419 ENCOUNTER FOR GYNECOLOGICAL EXAMINATION (GENERAL) (ROUTINE) W/OUT ABNORMAL FINDINGS: ICD-10-CM

## 2024-05-13 DIAGNOSIS — Z12.39 ENCOUNTER FOR OTHER SCREENING FOR MALIGNANT NEOPLASM OF BREAST: ICD-10-CM

## 2024-05-13 DIAGNOSIS — Z86.018 PERSONAL HISTORY OF OTHER BENIGN NEOPLASM: ICD-10-CM

## 2024-05-13 PROCEDURE — 99459 PELVIC EXAMINATION: CPT

## 2024-05-13 PROCEDURE — 99396 PREV VISIT EST AGE 40-64: CPT

## 2024-05-13 RX ORDER — CLONAZEPAM 1 MG/1
1 TABLET ORAL
Qty: 10 | Refills: 0 | Status: COMPLETED | COMMUNITY
Start: 2024-04-19 | End: 2024-05-13

## 2024-05-13 RX ORDER — LEVONORGESTREL 52 MG/1
INTRAUTERINE DEVICE INTRAUTERINE
Refills: 0 | Status: ACTIVE | COMMUNITY

## 2024-05-13 NOTE — PHYSICAL EXAM
[Chaperone Present] : A chaperone was present in the examining room during all aspects of the physical examination [[id number]] : A chaperone was present during the pelvic exam. [Appropriately responsive] : appropriately responsive [Alert] : alert [No Acute Distress] : no acute distress [Soft] : soft [Non-tender] : non-tender [Non-distended] : non-distended [Oriented x3] : oriented x3 [Examination Of The Breasts] : a normal appearance [No Discharge] : no discharge [No Masses] : no breast masses were palpable [Labia Majora] : normal [Labia Minora] : normal [Normal] : normal [Uterine Adnexae] : normal

## 2024-05-13 NOTE — HISTORY OF PRESENT ILLNESS
[IUD] : has an intrauterine device [Y] : Patient is sexually active [N] : Patient denies prior pregnancies [TextBox_102] : Mirena [LMPDate] : 12/5/23 [MensesFreq] : 28 [MensesLength] : 1 [MensesAmount] : light [de-identified] : Mirena [Regular Cycle Intervals] : periods have been regular [FreeTextEntry1] : 5/1/24 [No] : Patient does not have concerns regarding sex [Currently Active] : currently active [Men] : men

## 2024-05-13 NOTE — PROCEDURE
pain, foreign body/REDNESS [Cervical Pap Smear] : cervical Pap smear [Liquid Base] : liquid base [GC & Chlamydia via Pap] : GC & Chlamydia via Pap [Tolerated Well] : the patient tolerated the procedure well [No Complications] : there were no complications

## 2024-05-15 LAB
C TRACH RRNA SPEC QL NAA+PROBE: NOT DETECTED
N GONORRHOEA RRNA SPEC QL NAA+PROBE: NOT DETECTED
SOURCE TP AMPLIFICATION: NORMAL

## 2024-05-20 LAB
CYTOLOGY CVX/VAG DOC THIN PREP: ABNORMAL
HPV HIGH+LOW RISK DNA PNL CVX: DETECTED

## 2024-05-21 ENCOUNTER — NON-APPOINTMENT (OUTPATIENT)
Age: 39
End: 2024-05-21

## 2024-05-24 ENCOUNTER — APPOINTMENT (OUTPATIENT)
Dept: PSYCHIATRY | Facility: CLINIC | Age: 39
End: 2024-05-24
Payer: COMMERCIAL

## 2024-05-24 ENCOUNTER — OUTPATIENT (OUTPATIENT)
Dept: OUTPATIENT SERVICES | Facility: HOSPITAL | Age: 39
LOS: 1 days | End: 2024-05-24
Payer: COMMERCIAL

## 2024-05-24 DIAGNOSIS — F31.9 BIPOLAR DISORDER, UNSPECIFIED: ICD-10-CM

## 2024-05-24 PROCEDURE — 99214 OFFICE O/P EST MOD 30 MIN: CPT | Mod: 95

## 2024-05-24 RX ORDER — OLANZAPINE 15 MG/1
15 TABLET, FILM COATED ORAL
Qty: 30 | Refills: 2 | Status: ACTIVE | COMMUNITY
Start: 1900-01-01 | End: 1900-01-01

## 2024-05-24 NOTE — PHYSICAL EXAM
[Average] : average [Cooperative] : cooperative [Euthymic] : euthymic [Full] : full [Clear] : clear [Linear/Goal Directed] : linear/goal directed [WNL] : within normal limits [de-identified] : Less pressured [de-identified] : impaired during recent elevation [de-identified] : impaired during recent elevation

## 2024-05-24 NOTE — PLAN
[Medication education provided] : Medication education provided. [Rationale for medication choices, possible risks/precautions, benefits, alternative treatment choices, and consequences of non-treatment discussed] : Rationale for medication choices, possible risks/precautions, benefits, alternative treatment choices, and consequences of non-treatment discussed with patient/family/caregiver  [FreeTextEntry4] : Medication management to maintain euthymia and prevent manic and depressive episodes over time. [FreeTextEntry5] : Continue Lithium at 600mg qAM + 900mg qhs Continue Zyprexa at 15mg qhs No longer taking Klonopin  RTC 1 month
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
## 2024-05-25 DIAGNOSIS — F31.9 BIPOLAR DISORDER, UNSPECIFIED: ICD-10-CM

## 2024-06-04 ENCOUNTER — APPOINTMENT (OUTPATIENT)
Dept: OBGYN | Facility: CLINIC | Age: 39
End: 2024-06-04
Payer: COMMERCIAL

## 2024-06-04 ENCOUNTER — OUTPATIENT (OUTPATIENT)
Dept: OUTPATIENT SERVICES | Facility: HOSPITAL | Age: 39
LOS: 1 days | End: 2024-06-04
Payer: COMMERCIAL

## 2024-06-04 ENCOUNTER — RESULT REVIEW (OUTPATIENT)
Age: 39
End: 2024-06-04

## 2024-06-04 VITALS
SYSTOLIC BLOOD PRESSURE: 110 MMHG | HEIGHT: 65 IN | BODY MASS INDEX: 25.33 KG/M2 | HEART RATE: 69 BPM | WEIGHT: 152 LBS | DIASTOLIC BLOOD PRESSURE: 72 MMHG

## 2024-06-04 DIAGNOSIS — R87.810 ATYPICAL SQUAMOUS CELLS OF UNDETERMINED SIGNIFICANCE ON CYTOLOGIC SMEAR OF CERVIX (ASC-US): ICD-10-CM

## 2024-06-04 DIAGNOSIS — R87.610 ATYPICAL SQUAMOUS CELLS OF UNDETERMINED SIGNIFICANCE ON CYTOLOGIC SMEAR OF CERVIX (ASC-US): ICD-10-CM

## 2024-06-04 DIAGNOSIS — R92.8 OTHER ABNORMAL AND INCONCLUSIVE FINDINGS ON DIAGNOSTIC IMAGING OF BREAST: ICD-10-CM

## 2024-06-04 DIAGNOSIS — Z12.31 ENCOUNTER FOR SCREENING MAMMOGRAM FOR MALIGNANT NEOPLASM OF BREAST: ICD-10-CM

## 2024-06-04 PROCEDURE — 76642 ULTRASOUND BREAST LIMITED: CPT | Mod: RT

## 2024-06-04 PROCEDURE — 77066 DX MAMMO INCL CAD BI: CPT

## 2024-06-04 PROCEDURE — 77066 DX MAMMO INCL CAD BI: CPT | Mod: 26

## 2024-06-04 PROCEDURE — 76642 ULTRASOUND BREAST LIMITED: CPT | Mod: 26,RT

## 2024-06-04 PROCEDURE — 77062 BREAST TOMOSYNTHESIS BI: CPT | Mod: 26

## 2024-06-04 PROCEDURE — G0279: CPT

## 2024-06-04 PROCEDURE — 57454 BX/CURETT OF CERVIX W/SCOPE: CPT

## 2024-06-04 NOTE — PROCEDURE
[Colposcopy] : Colposcopy  [Time out performed] : Pre-procedure time out performed.  Patient's name, date of birth and procedure confirmed. [Consent Obtained] : Consent obtained [Risks] : risks [Benefits] : benefits [Alternatives] : alternatives [Patient] : patient [Infection] : infection [Bleeding] : bleeding [Allergic Reaction] : allergic reaction [ASCUS] : ASCUS [HPV High Risk] : HPV high risk [No Premedication] : no premedication [Colposcopy Adequate] : colposcopy adequate [Pap Performed] : pap not performed [SCI Fully Visualized] : SCI fully visualized [ECC Performed] : ECC performed [No Abnormalities] : no abnormalities [Lesion] : lesion seen [Biopsy] : biopsy taken [Hemostasis Obtained] : Hemostasis obtained [Tolerated Well] : the patient tolerated the procedure well [de-identified] : 2 [de-identified] : WE mosaicism punctation see image  [de-identified] : 12,7 [de-identified] : punch [de-identified] : Monsels

## 2024-06-05 DIAGNOSIS — R92.8 OTHER ABNORMAL AND INCONCLUSIVE FINDINGS ON DIAGNOSTIC IMAGING OF BREAST: ICD-10-CM

## 2024-06-11 LAB — CORE LAB BIOPSY: NORMAL

## 2024-06-13 ENCOUNTER — NON-APPOINTMENT (OUTPATIENT)
Age: 39
End: 2024-06-13

## 2024-06-14 ENCOUNTER — APPOINTMENT (OUTPATIENT)
Dept: OBGYN | Facility: CLINIC | Age: 39
End: 2024-06-14
Payer: COMMERCIAL

## 2024-06-14 ENCOUNTER — LABORATORY RESULT (OUTPATIENT)
Age: 39
End: 2024-06-14

## 2024-06-14 ENCOUNTER — RESULT CHARGE (OUTPATIENT)
Age: 39
End: 2024-06-14

## 2024-06-14 VITALS
DIASTOLIC BLOOD PRESSURE: 83 MMHG | SYSTOLIC BLOOD PRESSURE: 120 MMHG | WEIGHT: 152 LBS | HEIGHT: 65 IN | BODY MASS INDEX: 25.33 KG/M2

## 2024-06-14 DIAGNOSIS — R82.998 OTHER ABNORMAL FINDINGS IN URINE: ICD-10-CM

## 2024-06-14 DIAGNOSIS — N89.8 OTHER SPECIFIED NONINFLAMMATORY DISORDERS OF VAGINA: ICD-10-CM

## 2024-06-14 DIAGNOSIS — R31.29 OTHER MICROSCOPIC HEMATURIA: ICD-10-CM

## 2024-06-14 DIAGNOSIS — R39.9 UNSPECIFIED SYMPTOMS AND SIGNS INVOLVING THE GENITOURINARY SYSTEM: ICD-10-CM

## 2024-06-14 LAB
BILIRUB UR QL STRIP: NEGATIVE
CLARITY UR: NORMAL
COLLECTION METHOD: NORMAL
GLUCOSE UR-MCNC: NEGATIVE
HCG UR QL: 0.2 EU/DL
HGB UR QL STRIP.AUTO: ABNORMAL
KETONES UR-MCNC: NEGATIVE
LEUKOCYTE ESTERASE UR QL STRIP: ABNORMAL
NITRITE UR QL STRIP: NEGATIVE
PH UR STRIP: 7
PROT UR STRIP-MCNC: NEGATIVE
SP GR UR STRIP: 1.02

## 2024-06-14 PROCEDURE — 99459 PELVIC EXAMINATION: CPT

## 2024-06-14 PROCEDURE — 81003 URINALYSIS AUTO W/O SCOPE: CPT | Mod: QW

## 2024-06-14 PROCEDURE — 99213 OFFICE O/P EST LOW 20 MIN: CPT

## 2024-06-14 RX ORDER — SPIRONOLACTONE 50 MG/1
TABLET ORAL
Refills: 0 | Status: ACTIVE | COMMUNITY

## 2024-06-14 RX ORDER — CIPROFLOXACIN HYDROCHLORIDE 500 MG/1
500 TABLET, FILM COATED ORAL TWICE DAILY
Qty: 14 | Refills: 0 | Status: ACTIVE | COMMUNITY
Start: 2024-06-14 | End: 1900-01-01

## 2024-06-14 RX ORDER — METRONIDAZOLE 500 MG/1
500 TABLET ORAL TWICE DAILY
Qty: 14 | Refills: 0 | Status: ACTIVE | COMMUNITY
Start: 2024-06-14 | End: 1900-01-01

## 2024-06-14 NOTE — HISTORY OF PRESENT ILLNESS
[Normal Amount/Duration] :  normal amount and duration [Regular Cycle Intervals] : periods have been regular [FreeTextEntry1] : 06/11/24 [Currently Active] : currently active [Men] : men [Vaginal] : vaginal [No] : No

## 2024-06-14 NOTE — PHYSICAL EXAM
[Chaperone Present] : A chaperone was present in the examining room during all aspects of the physical examination [[id number]] : A chaperone was present during the pelvic exam. [FreeTextEntry2] : JOSE J Garrett [Appropriately responsive] : appropriately responsive [Alert] : alert [No Acute Distress] : no acute distress [Soft] : soft [Non-tender] : non-tender [Non-distended] : non-distended [Oriented x3] : oriented x3 [Labia Majora] : normal [Labia Minora] : normal [Discharge] : a  ~M vaginal discharge was present [Moderate] : moderate [Foul Smelling] : foul smelling [Thick] : thick [Normal] : normal [Uterine Adnexae] : normal

## 2024-06-19 LAB
A VAGINAE DNA VAG QL NAA+PROBE: NORMAL
APPEARANCE: CLEAR
BACTERIA UR CULT: ABNORMAL
BILIRUBIN URINE: NEGATIVE
BLOOD URINE: ABNORMAL
BVAB2 DNA VAG QL NAA+PROBE: NORMAL
C KRUSEI DNA VAG QL NAA+PROBE: NEGATIVE
C TRACH RRNA SPEC QL NAA+PROBE: NEGATIVE
CANDIDA DNA VAG QL NAA+PROBE: NEGATIVE
COLOR: YELLOW
GLUCOSE QUALITATIVE U: NEGATIVE MG/DL
KETONES URINE: NEGATIVE MG/DL
LEUKOCYTE ESTERASE URINE: ABNORMAL
MEGA1 DNA VAG QL NAA+PROBE: ABNORMAL
N GONORRHOEA RRNA SPEC QL NAA+PROBE: NEGATIVE
NITRITE URINE: NEGATIVE
PH URINE: 7
PROTEIN URINE: NEGATIVE MG/DL
SPECIFIC GRAVITY URINE: 1.01
T VAGINALIS RRNA SPEC QL NAA+PROBE: NEGATIVE
UROBILINOGEN URINE: 0.2 MG/DL

## 2024-06-20 ENCOUNTER — APPOINTMENT (OUTPATIENT)
Dept: PSYCHIATRY | Facility: CLINIC | Age: 39
End: 2024-06-20
Payer: COMMERCIAL

## 2024-06-20 ENCOUNTER — OUTPATIENT (OUTPATIENT)
Dept: OUTPATIENT SERVICES | Facility: HOSPITAL | Age: 39
LOS: 1 days | End: 2024-06-20
Payer: COMMERCIAL

## 2024-06-20 DIAGNOSIS — F31.9 BIPOLAR DISORDER, UNSPECIFIED: ICD-10-CM

## 2024-06-20 PROCEDURE — 99214 OFFICE O/P EST MOD 30 MIN: CPT | Mod: 95

## 2024-06-21 DIAGNOSIS — F31.9 BIPOLAR DISORDER, UNSPECIFIED: ICD-10-CM
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
## 2024-06-21 NOTE — PLAN
[Medication education provided] : Medication education provided. [Rationale for medication choices, possible risks/precautions, benefits, alternative treatment choices, and consequences of non-treatment discussed] : Rationale for medication choices, possible risks/precautions, benefits, alternative treatment choices, and consequences of non-treatment discussed with patient/family/caregiver  [FreeTextEntry4] : Medication management to maintain euthymia and prevent manic and depressive episodes over time. [FreeTextEntry5] : Continue Lithium at 600mg qAM + 900mg qhs Continue Zyprexa at 15mg qhs New labs (fasting) RTC 1 month

## 2024-06-21 NOTE — DISCUSSION/SUMMARY
[Date of Last Annual Labs: _____] : Date of Last Annual Labs: [unfilled] [Date of Last HbgA1c: _____] : Date of Last HbgA1c: [unfilled] [Date of Last Lipid Profile: _____] : Date of Last Lipid Profile: [unfilled] [FreeTextEntry1] : Pt with long-standing bipolar illness.  Continues to stabilize.  Legal and licensing consequences of her manic episode remain to be seen.

## 2024-06-21 NOTE — PHYSICAL EXAM
[Average] : average [Cooperative] : cooperative [Euthymic] : euthymic [Full] : full [Clear] : clear [Linear/Goal Directed] : linear/goal directed [WNL] : within normal limits [de-identified] : Less pressured [de-identified] : impaired during recent elevation [de-identified] : impaired during recent elevation

## 2024-06-26 ENCOUNTER — OUTPATIENT (OUTPATIENT)
Dept: OUTPATIENT SERVICES | Facility: HOSPITAL | Age: 39
LOS: 1 days | End: 2024-06-26

## 2024-06-26 DIAGNOSIS — F31.9 BIPOLAR DISORDER, UNSPECIFIED: ICD-10-CM

## 2024-06-26 LAB
ALBUMIN SERPL ELPH-MCNC: 4.5 G/DL
ALP BLD-CCNC: 55 U/L
ALT SERPL-CCNC: 11 U/L
ANION GAP SERPL CALC-SCNC: 13 MMOL/L
AST SERPL-CCNC: 15 U/L
BILIRUB SERPL-MCNC: 0.4 MG/DL
BUN SERPL-MCNC: 24 MG/DL
CALCIUM SERPL-MCNC: 10.2 MG/DL
CHLORIDE SERPL-SCNC: 104 MMOL/L
CHOLEST SERPL-MCNC: 195 MG/DL
CO2 SERPL-SCNC: 20 MMOL/L
CREAT SERPL-MCNC: 1 MG/DL
EGFR: 73 ML/MIN/1.73M2
ESTIMATED AVERAGE GLUCOSE: 105 MG/DL
GLUCOSE SERPL-MCNC: 98 MG/DL
HBA1C MFR BLD HPLC: 5.3 %
HCT VFR BLD CALC: 42.5 %
HDLC SERPL-MCNC: 45 MG/DL
HGB BLD-MCNC: 13.8 G/DL
LDLC SERPL CALC-MCNC: 132 MG/DL
LITHIUM SERPL-SCNC: 0.94 MMOL/L
MCHC RBC-ENTMCNC: 30.2 PG
MCHC RBC-ENTMCNC: 32.5 G/DL
MCV RBC AUTO: 93 FL
NONHDLC SERPL-MCNC: 150 MG/DL
PLATELET # BLD AUTO: 335 K/UL
PMV BLD AUTO: 0 /100 WBCS
PMV BLD: 10.3 FL
POTASSIUM SERPL-SCNC: 5.2 MMOL/L
PROT SERPL-MCNC: 7.4 G/DL
RBC # BLD: 4.57 M/UL
RBC # FLD: 12.3 %
SODIUM SERPL-SCNC: 137 MMOL/L
TRIGL SERPL-MCNC: 90 MG/DL
TSH SERPL-ACNC: 1.55 UIU/ML
WBC # FLD AUTO: 7.64 K/UL

## 2024-06-27 DIAGNOSIS — F31.9 BIPOLAR DISORDER, UNSPECIFIED: ICD-10-CM

## 2024-06-27 NOTE — ED ADULT NURSE NOTE - FALL HARM RISK TYPE OF ASSESSMENT
What Is The Reason For Today's Visit?: Full Body Skin Examination What Is The Reason For Today's Visit? (Being Monitored For X): concerning skin lesions on a periodic basis Admission

## 2024-07-22 ENCOUNTER — APPOINTMENT (OUTPATIENT)
Dept: PSYCHIATRY | Facility: CLINIC | Age: 39
End: 2024-07-22
Payer: COMMERCIAL

## 2024-07-22 DIAGNOSIS — F31.9 BIPOLAR DISORDER, UNSPECIFIED: ICD-10-CM

## 2024-07-22 PROCEDURE — 99214 OFFICE O/P EST MOD 30 MIN: CPT | Mod: 95

## 2024-07-22 NOTE — PLAN
[Medication education provided] : Medication education provided. [Rationale for medication choices, possible risks/precautions, benefits, alternative treatment choices, and consequences of non-treatment discussed] : Rationale for medication choices, possible risks/precautions, benefits, alternative treatment choices, and consequences of non-treatment discussed with patient/family/caregiver  [FreeTextEntry4] : Medication management to maintain euthymia and prevent manic and depressive episodes over time. [FreeTextEntry5] : Continue Lithium at 600mg qAM + 900mg qhs Continue Zyprexa at 15mg qhs RTC 6 weeks

## 2024-07-22 NOTE — DISCUSSION/SUMMARY
[Date of Last Annual Labs: _____] : Date of Last Annual Labs: [unfilled] [Date of Last Lipid Profile: _____] : Date of Last Lipid Profile: [unfilled] [FreeTextEntry1] : Pt with long-standing bipolar illness.  Pt presents at stable baseline.  [Date of Last HbgA1c: _____] : Date of Last HbgA1c: [unfilled]
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
## 2024-07-22 NOTE — PHYSICAL EXAM
[Average] : average [Cooperative] : cooperative [Euthymic] : euthymic [Full] : full [Clear] : clear [Linear/Goal Directed] : linear/goal directed [WNL] : within normal limits [de-identified] : impaired during recent elevation [de-identified] : impaired during recent elevation

## 2024-09-16 ENCOUNTER — OUTPATIENT (OUTPATIENT)
Dept: OUTPATIENT SERVICES | Facility: HOSPITAL | Age: 39
LOS: 1 days | End: 2024-09-16
Payer: COMMERCIAL

## 2024-09-16 ENCOUNTER — APPOINTMENT (OUTPATIENT)
Dept: PSYCHIATRY | Facility: CLINIC | Age: 39
End: 2024-09-16
Payer: COMMERCIAL

## 2024-09-16 DIAGNOSIS — F31.9 BIPOLAR DISORDER, UNSPECIFIED: ICD-10-CM

## 2024-09-16 PROCEDURE — 99214 OFFICE O/P EST MOD 30 MIN: CPT | Mod: 95

## 2024-09-17 DIAGNOSIS — F31.9 BIPOLAR DISORDER, UNSPECIFIED: ICD-10-CM

## 2024-09-18 NOTE — PHYSICAL EXAM
[Average] : average [Cooperative] : cooperative [Euthymic] : euthymic [Full] : full [Clear] : clear [Linear/Goal Directed] : linear/goal directed [WNL] : within normal limits [de-identified] : impaired during recent elevation [de-identified] : impaired during recent elevation

## 2024-09-18 NOTE — DISCUSSION/SUMMARY
[Date of Last Annual Labs: _____] : Date of Last Annual Labs: [unfilled] [Date of Last HbgA1c: _____] : Date of Last HbgA1c: [unfilled] [Date of Last Lipid Profile: _____] : Date of Last Lipid Profile: [unfilled] [FreeTextEntry1] : Pt with long-standing bipolar illness.  Pt presents at stable baseline.
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
## 2024-09-18 NOTE — PHYSICAL EXAM
[Average] : average [Cooperative] : cooperative [Euthymic] : euthymic [Full] : full [Clear] : clear [Linear/Goal Directed] : linear/goal directed [WNL] : within normal limits [de-identified] : impaired during recent elevation [de-identified] : impaired during recent elevation

## 2024-09-18 NOTE — PHYSICAL EXAM
[Average] : average [Cooperative] : cooperative [Euthymic] : euthymic [Full] : full [Clear] : clear [Linear/Goal Directed] : linear/goal directed [WNL] : within normal limits [de-identified] : impaired during recent elevation [de-identified] : impaired during recent elevation

## 2024-10-15 NOTE — PATIENT PROFILE BEHAVIORAL HEALTH - FUNCTIONAL SCREEN CURRENT LEVEL: COMMUNICATION, MLM
"Ambulatory Visit  Name: Ying Reese      : 1977      MRN: [id number]  Encounter Provider: Stacy Kennedy DO  Encounter Date: 10/15/2024   Encounter department: [facility]    Assessment & Plan       History of Present Illness   {?Quick Links Encounters * My Last Note * Last Note in Specialty * Snapshot * Since Last Visit * History :03018}  HPI    {History obtained from (Optional):47238}  Review of Systems  {Select to Display PMH (Optional):82912}      Objective   {?Quick Links Trend Vitals * Enter New Vitals * Results Review * Timeline (Adult) * Labs * Imaging * Cardiology * Procedures * Lung Cancer Screening * Surgical eConsent :75764}  /68 (BP Location: Left arm, Patient Position: Sitting, Cuff Size: Standard)   Pulse 96   Temp 97.9 °F (36.6 °C)   Ht 5' 5\" (1.651 m)   Wt 98 kg (216 lb)   LMP 05/15/2020   SpO2 95%   BMI 35.94 kg/m²     Physical Exam  {Administrative / Billing Section (Optional):25553}  " 0 = understands/communicates without difficulty

## 2024-11-11 ENCOUNTER — OUTPATIENT (OUTPATIENT)
Dept: OUTPATIENT SERVICES | Facility: HOSPITAL | Age: 39
LOS: 1 days | End: 2024-11-11
Payer: COMMERCIAL

## 2024-11-11 ENCOUNTER — APPOINTMENT (OUTPATIENT)
Dept: PSYCHIATRY | Facility: CLINIC | Age: 39
End: 2024-11-11
Payer: COMMERCIAL

## 2024-11-11 DIAGNOSIS — F31.9 BIPOLAR DISORDER, UNSPECIFIED: ICD-10-CM

## 2024-11-11 PROCEDURE — 99214 OFFICE O/P EST MOD 30 MIN: CPT | Mod: 95

## 2024-11-12 ENCOUNTER — OUTPATIENT (OUTPATIENT)
Dept: OUTPATIENT SERVICES | Facility: HOSPITAL | Age: 39
LOS: 1 days | End: 2024-11-12

## 2024-11-12 DIAGNOSIS — F31.9 BIPOLAR DISORDER, UNSPECIFIED: ICD-10-CM

## 2024-11-13 DIAGNOSIS — F31.9 BIPOLAR DISORDER, UNSPECIFIED: ICD-10-CM

## 2024-11-13 LAB
ALBUMIN SERPL ELPH-MCNC: 4.5 G/DL
ALP BLD-CCNC: 69 U/L
ALT SERPL-CCNC: 10 U/L
ANION GAP SERPL CALC-SCNC: 9 MMOL/L
AST SERPL-CCNC: 16 U/L
BILIRUB SERPL-MCNC: 0.6 MG/DL
BUN SERPL-MCNC: 18 MG/DL
CALCIUM SERPL-MCNC: 10.3 MG/DL
CHLORIDE SERPL-SCNC: 104 MMOL/L
CHOLEST SERPL-MCNC: 194 MG/DL
CO2 SERPL-SCNC: 25 MMOL/L
CREAT SERPL-MCNC: 0.8 MG/DL
EGFR: 95 ML/MIN/1.73M2
ESTIMATED AVERAGE GLUCOSE: 103 MG/DL
GLUCOSE SERPL-MCNC: 102 MG/DL
HBA1C MFR BLD HPLC: 5.2 %
HCT VFR BLD CALC: 45.7 %
HDLC SERPL-MCNC: 46 MG/DL
HGB BLD-MCNC: 14.6 G/DL
LDLC SERPL CALC-MCNC: 129 MG/DL
LITHIUM SERPL-SCNC: 1.02 MMOL/L
MCHC RBC-ENTMCNC: 30.7 PG
MCHC RBC-ENTMCNC: 31.9 G/DL
MCV RBC AUTO: 96.2 FL
NONHDLC SERPL-MCNC: 148 MG/DL
PLATELET # BLD AUTO: 310 K/UL
PMV BLD AUTO: 0 /100 WBCS
PMV BLD: 10.2 FL
POTASSIUM SERPL-SCNC: 4.5 MMOL/L
PROT SERPL-MCNC: 7.7 G/DL
RBC # BLD: 4.75 M/UL
RBC # FLD: 12.2 %
SODIUM SERPL-SCNC: 138 MMOL/L
TRIGL SERPL-MCNC: 95 MG/DL
TSH SERPL-ACNC: 2.93 UIU/ML
WBC # FLD AUTO: 5.94 K/UL

## 2024-12-02 ENCOUNTER — APPOINTMENT (OUTPATIENT)
Dept: OBGYN | Facility: CLINIC | Age: 39
End: 2024-12-02
Payer: MEDICAID

## 2024-12-02 VITALS
DIASTOLIC BLOOD PRESSURE: 87 MMHG | BODY MASS INDEX: 26.66 KG/M2 | SYSTOLIC BLOOD PRESSURE: 137 MMHG | HEART RATE: 93 BPM | HEIGHT: 65 IN | WEIGHT: 160 LBS

## 2024-12-02 DIAGNOSIS — N63.10 UNSPECIFIED LUMP IN THE RIGHT BREAST, UNSPECIFIED QUADRANT: ICD-10-CM

## 2024-12-02 DIAGNOSIS — N87.0 MILD CERVICAL DYSPLASIA: ICD-10-CM

## 2024-12-02 PROCEDURE — 99213 OFFICE O/P EST LOW 20 MIN: CPT

## 2024-12-02 PROCEDURE — 99459 PELVIC EXAMINATION: CPT

## 2024-12-03 ENCOUNTER — LABORATORY RESULT (OUTPATIENT)
Age: 39
End: 2024-12-03

## 2024-12-05 ENCOUNTER — RESULT REVIEW (OUTPATIENT)
Age: 39
End: 2024-12-05

## 2024-12-05 ENCOUNTER — OUTPATIENT (OUTPATIENT)
Dept: OUTPATIENT SERVICES | Facility: HOSPITAL | Age: 39
LOS: 1 days | End: 2024-12-05
Payer: COMMERCIAL

## 2024-12-05 DIAGNOSIS — R92.8 OTHER ABNORMAL AND INCONCLUSIVE FINDINGS ON DIAGNOSTIC IMAGING OF BREAST: ICD-10-CM

## 2024-12-05 DIAGNOSIS — Z00.8 ENCOUNTER FOR OTHER GENERAL EXAMINATION: ICD-10-CM

## 2024-12-05 PROCEDURE — 76642 ULTRASOUND BREAST LIMITED: CPT | Mod: 26,RT

## 2024-12-05 PROCEDURE — 76642 ULTRASOUND BREAST LIMITED: CPT | Mod: RT

## 2024-12-06 DIAGNOSIS — R92.8 OTHER ABNORMAL AND INCONCLUSIVE FINDINGS ON DIAGNOSTIC IMAGING OF BREAST: ICD-10-CM

## 2024-12-06 LAB — HPV HIGH+LOW RISK DNA PNL CVX: DETECTED

## 2024-12-12 LAB — CYTOLOGY CVX/VAG DOC THIN PREP: ABNORMAL

## 2024-12-17 ENCOUNTER — NON-APPOINTMENT (OUTPATIENT)
Age: 39
End: 2024-12-17

## 2024-12-17 ENCOUNTER — APPOINTMENT (OUTPATIENT)
Dept: PSYCHIATRY | Facility: CLINIC | Age: 39
End: 2024-12-17
Payer: MEDICAID

## 2024-12-17 ENCOUNTER — OUTPATIENT (OUTPATIENT)
Dept: OUTPATIENT SERVICES | Facility: HOSPITAL | Age: 39
LOS: 1 days | End: 2024-12-17
Payer: MEDICAID

## 2024-12-17 DIAGNOSIS — N76.0 ACUTE VAGINITIS: ICD-10-CM

## 2024-12-17 DIAGNOSIS — R63.5 ABNORMAL WEIGHT GAIN: ICD-10-CM

## 2024-12-17 DIAGNOSIS — T50.905A ABNORMAL WEIGHT GAIN: ICD-10-CM

## 2024-12-17 DIAGNOSIS — F31.9 BIPOLAR DISORDER, UNSPECIFIED: ICD-10-CM

## 2024-12-17 DIAGNOSIS — B96.89 ACUTE VAGINITIS: ICD-10-CM

## 2024-12-17 PROCEDURE — 99214 OFFICE O/P EST MOD 30 MIN: CPT | Mod: 95

## 2024-12-17 RX ORDER — METFORMIN HYDROCHLORIDE 500 MG/1
500 TABLET, COATED ORAL TWICE DAILY
Qty: 60 | Refills: 2 | Status: ACTIVE | COMMUNITY
Start: 2024-12-17 | End: 1900-01-01

## 2024-12-17 RX ORDER — METRONIDAZOLE 500 MG/1
500 TABLET ORAL TWICE DAILY
Qty: 14 | Refills: 1 | Status: ACTIVE | COMMUNITY
Start: 2024-12-17 | End: 1900-01-01

## 2024-12-18 DIAGNOSIS — R63.5 ABNORMAL WEIGHT GAIN: ICD-10-CM

## 2025-02-07 ENCOUNTER — APPOINTMENT (OUTPATIENT)
Dept: PSYCHIATRY | Facility: CLINIC | Age: 40
End: 2025-02-07
Payer: MEDICAID

## 2025-02-07 ENCOUNTER — OUTPATIENT (OUTPATIENT)
Dept: OUTPATIENT SERVICES | Facility: HOSPITAL | Age: 40
LOS: 1 days | End: 2025-02-07
Payer: MEDICAID

## 2025-02-07 DIAGNOSIS — T50.905A ABNORMAL WEIGHT GAIN: ICD-10-CM

## 2025-02-07 DIAGNOSIS — R63.5 ABNORMAL WEIGHT GAIN: ICD-10-CM

## 2025-02-07 DIAGNOSIS — F31.9 BIPOLAR DISORDER, UNSPECIFIED: ICD-10-CM

## 2025-02-07 DIAGNOSIS — T50.905A ADVERSE EFFECT OF UNSPECIFIED DRUGS, MEDICAMENTS AND BIOLOGICAL SUBSTANCES, INITIAL ENCOUNTER: ICD-10-CM

## 2025-02-07 PROCEDURE — 99214 OFFICE O/P EST MOD 30 MIN: CPT | Mod: 95

## 2025-02-07 PROCEDURE — 98007 SYNCH AUDIO-VIDEO EST HI 40: CPT

## 2025-02-08 DIAGNOSIS — T50.905A ADVERSE EFFECT OF UNSPECIFIED DRUGS, MEDICAMENTS AND BIOLOGICAL SUBSTANCES, INITIAL ENCOUNTER: ICD-10-CM

## 2025-02-08 DIAGNOSIS — R63.5 ABNORMAL WEIGHT GAIN: ICD-10-CM

## 2025-02-08 DIAGNOSIS — F31.9 BIPOLAR DISORDER, UNSPECIFIED: ICD-10-CM

## 2025-02-12 ENCOUNTER — OUTPATIENT (OUTPATIENT)
Dept: OUTPATIENT SERVICES | Facility: HOSPITAL | Age: 40
LOS: 1 days | End: 2025-02-12

## 2025-02-12 LAB
ALBUMIN SERPL ELPH-MCNC: 4.3 G/DL
ALP BLD-CCNC: 80 U/L
ALT SERPL-CCNC: 8 U/L
ANION GAP SERPL CALC-SCNC: 11 MMOL/L
AST SERPL-CCNC: 11 U/L
BILIRUB SERPL-MCNC: 0.4 MG/DL
BUN SERPL-MCNC: 11 MG/DL
CALCIUM SERPL-MCNC: 10.2 MG/DL
CHLORIDE SERPL-SCNC: 107 MMOL/L
CO2 SERPL-SCNC: 22 MMOL/L
CREAT SERPL-MCNC: 0.8 MG/DL
EGFR: 94 ML/MIN/1.73M2
ESTIMATED AVERAGE GLUCOSE: 94 MG/DL
GLUCOSE SERPL-MCNC: 90 MG/DL
HBA1C MFR BLD HPLC: 4.9 %
HCT VFR BLD CALC: 42.1 %
HGB BLD-MCNC: 13.7 G/DL
MCHC RBC-ENTMCNC: 31.4 PG
MCHC RBC-ENTMCNC: 32.5 G/DL
MCV RBC AUTO: 96.3 FL
PLATELET # BLD AUTO: 328 K/UL
PMV BLD AUTO: 0 /100 WBCS
PMV BLD: 10.1 FL
POTASSIUM SERPL-SCNC: 4.6 MMOL/L
PROT SERPL-MCNC: 7.1 G/DL
RBC # BLD: 4.37 M/UL
RBC # FLD: 12.9 %
SODIUM SERPL-SCNC: 140 MMOL/L
TSH SERPL-ACNC: 2.97 UIU/ML
WBC # FLD AUTO: 6.37 K/UL

## 2025-02-13 ENCOUNTER — NON-APPOINTMENT (OUTPATIENT)
Age: 40
End: 2025-02-13

## 2025-02-13 LAB — LITHIUM SERPL-SCNC: 1.18 MMOL/L

## 2025-04-03 ENCOUNTER — OUTPATIENT (OUTPATIENT)
Dept: OUTPATIENT SERVICES | Facility: HOSPITAL | Age: 40
LOS: 1 days | End: 2025-04-03
Payer: MEDICAID

## 2025-04-03 ENCOUNTER — APPOINTMENT (OUTPATIENT)
Dept: PSYCHIATRY | Facility: CLINIC | Age: 40
End: 2025-04-03
Payer: MEDICAID

## 2025-04-03 DIAGNOSIS — R63.5 ABNORMAL WEIGHT GAIN: ICD-10-CM

## 2025-04-03 DIAGNOSIS — F31.9 BIPOLAR DISORDER, UNSPECIFIED: ICD-10-CM

## 2025-04-03 DIAGNOSIS — T50.905A ABNORMAL WEIGHT GAIN: ICD-10-CM

## 2025-04-03 PROCEDURE — 98007 SYNCH AUDIO-VIDEO EST HI 40: CPT

## 2025-04-03 PROCEDURE — 99214 OFFICE O/P EST MOD 30 MIN: CPT | Mod: 95

## 2025-04-04 DIAGNOSIS — R63.5 ABNORMAL WEIGHT GAIN: ICD-10-CM

## 2025-05-02 ENCOUNTER — APPOINTMENT (OUTPATIENT)
Dept: PSYCHIATRY | Facility: CLINIC | Age: 40
End: 2025-05-02
Payer: MEDICAID

## 2025-05-02 ENCOUNTER — OUTPATIENT (OUTPATIENT)
Dept: OUTPATIENT SERVICES | Facility: HOSPITAL | Age: 40
LOS: 1 days | End: 2025-05-02
Payer: MEDICAID

## 2025-05-02 DIAGNOSIS — F31.9 BIPOLAR DISORDER, UNSPECIFIED: ICD-10-CM

## 2025-05-02 DIAGNOSIS — T50.905A ABNORMAL WEIGHT GAIN: ICD-10-CM

## 2025-05-02 DIAGNOSIS — R63.5 ABNORMAL WEIGHT GAIN: ICD-10-CM

## 2025-05-02 DIAGNOSIS — R63.8 OTHER SYMPTOMS AND SIGNS CONCERNING FOOD AND FLUID INTAKE: ICD-10-CM

## 2025-05-02 PROCEDURE — 98007 SYNCH AUDIO-VIDEO EST HI 40: CPT

## 2025-05-02 PROCEDURE — 99214 OFFICE O/P EST MOD 30 MIN: CPT | Mod: 95

## 2025-05-03 DIAGNOSIS — F31.9 BIPOLAR DISORDER, UNSPECIFIED: ICD-10-CM

## 2025-05-03 DIAGNOSIS — R63.8 OTHER SYMPTOMS AND SIGNS CONCERNING FOOD AND FLUID INTAKE: ICD-10-CM

## 2025-06-04 ENCOUNTER — APPOINTMENT (OUTPATIENT)
Dept: OBGYN | Facility: CLINIC | Age: 40
End: 2025-06-04
Payer: MEDICAID

## 2025-06-04 ENCOUNTER — LABORATORY RESULT (OUTPATIENT)
Age: 40
End: 2025-06-04

## 2025-06-04 ENCOUNTER — NON-APPOINTMENT (OUTPATIENT)
Age: 40
End: 2025-06-04

## 2025-06-04 VITALS
BODY MASS INDEX: 27.49 KG/M2 | DIASTOLIC BLOOD PRESSURE: 85 MMHG | SYSTOLIC BLOOD PRESSURE: 126 MMHG | HEIGHT: 65 IN | HEART RATE: 80 BPM | WEIGHT: 165 LBS

## 2025-06-04 DIAGNOSIS — R31.29 OTHER MICROSCOPIC HEMATURIA: ICD-10-CM

## 2025-06-04 DIAGNOSIS — Z09 ENCOUNTER FOR FOLLOW-UP EXAMINATION AFTER COMPLETED TREATMENT FOR CONDITIONS OTHER THAN MALIGNANT NEOPLASM: ICD-10-CM

## 2025-06-04 DIAGNOSIS — Z01.419 ENCOUNTER FOR GYNECOLOGICAL EXAMINATION (GENERAL) (ROUTINE) W/OUT ABNORMAL FINDINGS: ICD-10-CM

## 2025-06-04 DIAGNOSIS — Z97.5 PRESENCE OF (INTRAUTERINE) CONTRACEPTIVE DEVICE: ICD-10-CM

## 2025-06-04 PROCEDURE — 99396 PREV VISIT EST AGE 40-64: CPT

## 2025-06-04 PROCEDURE — 99459 PELVIC EXAMINATION: CPT

## 2025-06-04 PROCEDURE — 81003 URINALYSIS AUTO W/O SCOPE: CPT | Mod: QW

## 2025-06-06 LAB — HPV HIGH+LOW RISK DNA PNL CVX: DETECTED

## 2025-06-09 LAB
APPEARANCE: CLEAR
BACTERIA UR CULT: NORMAL
BILIRUBIN URINE: NEGATIVE
BLOOD URINE: NEGATIVE
C TRACH RRNA SPEC QL NAA+PROBE: NOT DETECTED
COLOR: YELLOW
GLUCOSE QUALITATIVE U: NEGATIVE MG/DL
KETONES URINE: NEGATIVE MG/DL
LEUKOCYTE ESTERASE URINE: NEGATIVE
N GONORRHOEA RRNA SPEC QL NAA+PROBE: NOT DETECTED
NITRITE URINE: NEGATIVE
PH URINE: 7
PROTEIN URINE: NEGATIVE MG/DL
SOURCE TP AMPLIFICATION: NORMAL
SPECIFIC GRAVITY URINE: 1
UROBILINOGEN URINE: 0.2 MG/DL

## 2025-06-20 ENCOUNTER — APPOINTMENT (OUTPATIENT)
Dept: PSYCHIATRY | Facility: CLINIC | Age: 40
End: 2025-06-20
Payer: MEDICAID

## 2025-06-20 ENCOUNTER — OUTPATIENT (OUTPATIENT)
Dept: OUTPATIENT SERVICES | Facility: HOSPITAL | Age: 40
LOS: 1 days | End: 2025-06-20
Payer: MEDICAID

## 2025-06-20 DIAGNOSIS — F31.9 BIPOLAR DISORDER, UNSPECIFIED: ICD-10-CM

## 2025-06-20 DIAGNOSIS — R63.5 ABNORMAL WEIGHT GAIN: ICD-10-CM

## 2025-06-20 PROBLEM — N76.0 BACTERIAL VAGINOSIS: Status: ACTIVE | Noted: 2024-12-17

## 2025-06-20 PROCEDURE — 99214 OFFICE O/P EST MOD 30 MIN: CPT | Mod: 95

## 2025-06-20 PROCEDURE — 98007 SYNCH AUDIO-VIDEO EST HI 40: CPT | Mod: 95

## 2025-06-20 PROCEDURE — 90833 PSYTX W PT W E/M 30 MIN: CPT

## 2025-06-20 RX ORDER — METRONIDAZOLE 500 MG/1
500 TABLET ORAL TWICE DAILY
Qty: 14 | Refills: 0 | Status: ACTIVE | COMMUNITY
Start: 2025-06-20 | End: 1900-01-01

## 2025-06-21 DIAGNOSIS — R63.5 ABNORMAL WEIGHT GAIN: ICD-10-CM

## 2025-06-24 ENCOUNTER — NON-APPOINTMENT (OUTPATIENT)
Age: 40
End: 2025-06-24

## 2025-06-28 ENCOUNTER — RESULT REVIEW (OUTPATIENT)
Age: 40
End: 2025-06-28

## 2025-06-28 ENCOUNTER — OUTPATIENT (OUTPATIENT)
Dept: OUTPATIENT SERVICES | Facility: HOSPITAL | Age: 40
LOS: 1 days | End: 2025-06-28
Payer: MEDICAID

## 2025-06-28 DIAGNOSIS — R92.8 OTHER ABNORMAL AND INCONCLUSIVE FINDINGS ON DIAGNOSTIC IMAGING OF BREAST: ICD-10-CM

## 2025-06-28 PROCEDURE — 76642 ULTRASOUND BREAST LIMITED: CPT | Mod: 26,RT

## 2025-06-28 PROCEDURE — G0279: CPT

## 2025-06-28 PROCEDURE — 77062 BREAST TOMOSYNTHESIS BI: CPT | Mod: 26

## 2025-06-28 PROCEDURE — 76642 ULTRASOUND BREAST LIMITED: CPT | Mod: RT

## 2025-06-28 PROCEDURE — 77066 DX MAMMO INCL CAD BI: CPT

## 2025-06-28 PROCEDURE — 77066 DX MAMMO INCL CAD BI: CPT | Mod: 26

## 2025-06-29 DIAGNOSIS — R92.8 OTHER ABNORMAL AND INCONCLUSIVE FINDINGS ON DIAGNOSTIC IMAGING OF BREAST: ICD-10-CM

## 2025-08-15 ENCOUNTER — APPOINTMENT (OUTPATIENT)
Dept: PSYCHIATRY | Facility: CLINIC | Age: 40
End: 2025-08-15
Payer: MEDICAID

## 2025-08-15 ENCOUNTER — OUTPATIENT (OUTPATIENT)
Dept: OUTPATIENT SERVICES | Facility: HOSPITAL | Age: 40
LOS: 1 days | End: 2025-08-15
Payer: MEDICAID

## 2025-08-15 DIAGNOSIS — T50.905A ABNORMAL WEIGHT GAIN: ICD-10-CM

## 2025-08-15 DIAGNOSIS — R63.5 ABNORMAL WEIGHT GAIN: ICD-10-CM

## 2025-08-15 DIAGNOSIS — F31.9 BIPOLAR DISORDER, UNSPECIFIED: ICD-10-CM

## 2025-08-15 PROCEDURE — 99214 OFFICE O/P EST MOD 30 MIN: CPT | Mod: 95

## 2025-08-15 PROCEDURE — 98007 SYNCH AUDIO-VIDEO EST HI 40: CPT

## 2025-08-16 DIAGNOSIS — R63.5 ABNORMAL WEIGHT GAIN: ICD-10-CM

## 2025-08-16 DIAGNOSIS — F31.9 BIPOLAR DISORDER, UNSPECIFIED: ICD-10-CM

## 2025-08-22 NOTE — ED PROVIDER NOTE - ATTENDING CONTRIBUTION TO CARE
Nutrition Assessment   Reason for consult/assessment: Reassessment    Diagnosis, Labs, Medication, History: Reviewed         Pertinent nutrition information pertaining to hospital course: LBM 8/19. Labs & meds reviewed. PO intake remains limited (has decreased to 0-25%; EN declined) with multiple refusals noted. RD will continue to follow at this time and monitor the palliative GOC; pt remains full code                  Oral diet order: IDDSI 4 Pureed (Dysphagia)              Diet tolerance: Tolerating with poor appetite/intakes recorded, Not applicable (patient refusing)  Nutrition supplement / snack tolerance: Not applicable (patient refusing), Tolerating with poor intake    Food allergies: None known    Anthropometrics Information  Wt Readings from Last 1 Encounters:   08/22/25 56.5 kg           % Weight change: 9.4% (5.9 kg) weight loss x 3 months  Weight change significant: Yes  Reason for weight change: Decreased intake    Estimated Needs  Estimated needs: not applicable at this time per clinical judgement  Calculated energy needs: 7586-6136  kcal                Calculated protein needs:   g     Calculated Fluid Needs: 1ml/kcal               NFPE  Nutrition Focused Physical Exam  Physical Exam Completed: No, remote coverage (08/22/25 1602 : Radha Wahl RD)                         Treatment Plan/Interventions   Meals & snacks: Provide and encourage adequate intake of a regular diet as tolerated.    Nutrition supplement therapy: restarted per POA request despite pt refusing/minimally consuming                                                                                                       Goals & Monitoring  Intervention: Meals and snacks    Goal: Tolerate oral diet, Maintain or improve weight, Meet >/equal 75% estimated needs, Increase oral intake to >/equal 50% of meals and supplements  Intervention goal status: New goal identified  Time frame to achieve goal: Ongoing    Dietitian  will monitor: Biochemical data, medical tests, procedures, Anthropometric Measurements, Food, beverage, and nutrient intake          Nutrition Diagnosis/PES   Nutrition Diagnosis: MalnutritionMalnutrition in the context of acute illness or injury: Severe    Related to: Poor appetite, Difficulty swallowing, Confusion/mental status changes  As evidenced by: Failed bedside swallow eval/video swallow evaluation, Need for NPO status, Documented/reported poor oral intake, Weight loss over time  Malnutrition diagnosis acute illness/injury; severe: Energy intake of <50% of estimated energy requirement for >/equals 5 days, Weight loss of >7.5%/3 months  Primary nutrition diagnosis status: Active nutrition diagnosis                 pt bipolar/manic with decompensation requiring IPP, medically clear